# Patient Record
Sex: MALE | Race: WHITE | NOT HISPANIC OR LATINO | Employment: UNEMPLOYED | URBAN - METROPOLITAN AREA
[De-identification: names, ages, dates, MRNs, and addresses within clinical notes are randomized per-mention and may not be internally consistent; named-entity substitution may affect disease eponyms.]

---

## 2017-02-01 ENCOUNTER — HOSPITAL ENCOUNTER (EMERGENCY)
Facility: OTHER | Age: 52
Discharge: LEFT BEFORE DISCHARGE | End: 2017-02-01

## 2017-02-01 VITALS
WEIGHT: 230 LBS | HEART RATE: 111 BPM | HEIGHT: 69 IN | OXYGEN SATURATION: 97 % | DIASTOLIC BLOOD PRESSURE: 81 MMHG | BODY MASS INDEX: 34.07 KG/M2 | TEMPERATURE: 98 F | RESPIRATION RATE: 20 BRPM | SYSTOLIC BLOOD PRESSURE: 122 MMHG

## 2017-02-01 PROCEDURE — 99282 EMERGENCY DEPT VISIT SF MDM: CPT

## 2017-02-01 PROCEDURE — 99900041 HC LEFT WITHOUT BEING SEEN- EMERGENCY

## 2017-02-01 PROCEDURE — 93005 ELECTROCARDIOGRAM TRACING: CPT

## 2017-02-01 PROCEDURE — 93010 ELECTROCARDIOGRAM REPORT: CPT | Mod: ,,, | Performed by: INTERNAL MEDICINE

## 2017-02-01 NOTE — ED NOTES
"After triage pt states "look man heres the deal, Sharee been drinking for like a month, and I reall looking for an IV and some minerals" Pt with steady gait, no slurred speech, and no nystagmus.  "

## 2017-02-01 NOTE — ED NOTES
"PT and brother up and demanding patient be placed in bed and "admitted" explained we are actively trying to get a bed ready for the patient. Pt and brother then began cursing and pointing in my face, and left the ER.   "

## 2017-02-01 NOTE — ED NOTES
"PT brother at [facility] stating, you need to admit him he has problems" Explained to patient we were working as hard as we can to get a room open so the patient could be seen by the doctor. Pt brother asks why hes not being admitted, and why no one is helping him. Explained that pt has had an EKG which was reviewed by the doctor, and has been triaged and was stable to wait for a room. Pt brother then called RN a "fucking asshole", and returned to his seat in the waiting area.  "

## 2021-06-10 ENCOUNTER — OFFICE VISIT (OUTPATIENT)
Dept: URGENT CARE | Facility: CLINIC | Age: 56
End: 2021-06-10
Payer: COMMERCIAL

## 2021-06-10 VITALS
HEART RATE: 78 BPM | SYSTOLIC BLOOD PRESSURE: 143 MMHG | WEIGHT: 220 LBS | OXYGEN SATURATION: 96 % | HEIGHT: 69 IN | DIASTOLIC BLOOD PRESSURE: 89 MMHG | BODY MASS INDEX: 32.58 KG/M2 | TEMPERATURE: 99 F

## 2021-06-10 DIAGNOSIS — J34.89 SINUS PRESSURE: Primary | ICD-10-CM

## 2021-06-10 DIAGNOSIS — J20.9 ACUTE PURULENT BRONCHITIS: ICD-10-CM

## 2021-06-10 DIAGNOSIS — J98.01 ACUTE BRONCHOSPASM: ICD-10-CM

## 2021-06-10 DIAGNOSIS — J01.40 ACUTE PANSINUSITIS, RECURRENCE NOT SPECIFIED: ICD-10-CM

## 2021-06-10 LAB
CTP QC/QA: YES
SARS-COV-2 RDRP RESP QL NAA+PROBE: NEGATIVE

## 2021-06-10 PROCEDURE — 96372 PR INJECTION,THERAP/PROPH/DIAG2ST, IM OR SUBCUT: ICD-10-PCS | Mod: S$GLB,,, | Performed by: INTERNAL MEDICINE

## 2021-06-10 PROCEDURE — 99214 OFFICE O/P EST MOD 30 MIN: CPT | Mod: 25,S$GLB,, | Performed by: INTERNAL MEDICINE

## 2021-06-10 PROCEDURE — 3008F PR BODY MASS INDEX (BMI) DOCUMENTED: ICD-10-PCS | Mod: CPTII,S$GLB,, | Performed by: INTERNAL MEDICINE

## 2021-06-10 PROCEDURE — 3008F BODY MASS INDEX DOCD: CPT | Mod: CPTII,S$GLB,, | Performed by: INTERNAL MEDICINE

## 2021-06-10 PROCEDURE — 99214 PR OFFICE/OUTPT VISIT, EST, LEVL IV, 30-39 MIN: ICD-10-PCS | Mod: 25,S$GLB,, | Performed by: INTERNAL MEDICINE

## 2021-06-10 PROCEDURE — 96372 THER/PROPH/DIAG INJ SC/IM: CPT | Mod: S$GLB,,, | Performed by: INTERNAL MEDICINE

## 2021-06-10 PROCEDURE — U0002 COVID-19 LAB TEST NON-CDC: HCPCS | Mod: QW,S$GLB,, | Performed by: INTERNAL MEDICINE

## 2021-06-10 PROCEDURE — U0002: ICD-10-PCS | Mod: QW,S$GLB,, | Performed by: INTERNAL MEDICINE

## 2021-06-10 RX ORDER — BETAMETHASONE SODIUM PHOSPHATE AND BETAMETHASONE ACETATE 3; 3 MG/ML; MG/ML
9 INJECTION, SUSPENSION INTRA-ARTICULAR; INTRALESIONAL; INTRAMUSCULAR; SOFT TISSUE
Status: DISCONTINUED | OUTPATIENT
Start: 2021-06-10 | End: 2021-06-10

## 2021-06-10 RX ORDER — DEXAMETHASONE SODIUM PHOSPHATE 100 MG/10ML
10 INJECTION INTRAMUSCULAR; INTRAVENOUS
Status: COMPLETED | OUTPATIENT
Start: 2021-06-10 | End: 2021-06-10

## 2021-06-10 RX ORDER — AZITHROMYCIN 250 MG/1
TABLET, FILM COATED ORAL
Qty: 6 TABLET | Refills: 0 | Status: SHIPPED | OUTPATIENT
Start: 2021-06-10 | End: 2021-06-10 | Stop reason: CLARIF

## 2021-06-10 RX ADMIN — DEXAMETHASONE SODIUM PHOSPHATE 10 MG: 100 INJECTION INTRAMUSCULAR; INTRAVENOUS at 10:06

## 2022-06-29 ENCOUNTER — OFFICE VISIT (OUTPATIENT)
Dept: SLEEP MEDICINE | Facility: CLINIC | Age: 57
End: 2022-06-29
Payer: COMMERCIAL

## 2022-06-29 VITALS — BODY MASS INDEX: 36.88 KG/M2 | WEIGHT: 249 LBS | HEIGHT: 69 IN

## 2022-06-29 DIAGNOSIS — R53.83 FATIGUE, UNSPECIFIED TYPE: ICD-10-CM

## 2022-06-29 DIAGNOSIS — K21.9 GASTROESOPHAGEAL REFLUX DISEASE WITHOUT ESOPHAGITIS: ICD-10-CM

## 2022-06-29 DIAGNOSIS — F17.200 NEEDS SMOKING CESSATION EDUCATION: ICD-10-CM

## 2022-06-29 DIAGNOSIS — R06.83 SNORING: Primary | ICD-10-CM

## 2022-06-29 DIAGNOSIS — G47.30 SLEEP APNEA, UNSPECIFIED TYPE: ICD-10-CM

## 2022-06-29 PROCEDURE — 99999 PR PBB SHADOW E&M-EST. PATIENT-LVL III: CPT | Mod: PBBFAC,,, | Performed by: INTERNAL MEDICINE

## 2022-06-29 PROCEDURE — 1160F PR REVIEW ALL MEDS BY PRESCRIBER/CLIN PHARMACIST DOCUMENTED: ICD-10-PCS | Mod: CPTII,S$GLB,, | Performed by: INTERNAL MEDICINE

## 2022-06-29 PROCEDURE — 1159F PR MEDICATION LIST DOCUMENTED IN MEDICAL RECORD: ICD-10-PCS | Mod: CPTII,S$GLB,, | Performed by: INTERNAL MEDICINE

## 2022-06-29 PROCEDURE — 1160F RVW MEDS BY RX/DR IN RCRD: CPT | Mod: CPTII,S$GLB,, | Performed by: INTERNAL MEDICINE

## 2022-06-29 PROCEDURE — 99202 PR OFFICE/OUTPT VISIT, NEW, LEVL II, 15-29 MIN: ICD-10-PCS | Mod: S$GLB,,, | Performed by: INTERNAL MEDICINE

## 2022-06-29 PROCEDURE — 99999 PR PBB SHADOW E&M-EST. PATIENT-LVL III: ICD-10-PCS | Mod: PBBFAC,,, | Performed by: INTERNAL MEDICINE

## 2022-06-29 PROCEDURE — 1159F MED LIST DOCD IN RCRD: CPT | Mod: CPTII,S$GLB,, | Performed by: INTERNAL MEDICINE

## 2022-06-29 PROCEDURE — 3008F BODY MASS INDEX DOCD: CPT | Mod: CPTII,S$GLB,, | Performed by: INTERNAL MEDICINE

## 2022-06-29 PROCEDURE — 3008F PR BODY MASS INDEX (BMI) DOCUMENTED: ICD-10-PCS | Mod: CPTII,S$GLB,, | Performed by: INTERNAL MEDICINE

## 2022-06-29 PROCEDURE — 99202 OFFICE O/P NEW SF 15 MIN: CPT | Mod: S$GLB,,, | Performed by: INTERNAL MEDICINE

## 2022-06-29 RX ORDER — ASPIRIN 325 MG
325 TABLET ORAL DAILY
COMMUNITY

## 2022-06-29 RX ORDER — OMEPRAZOLE 10 MG/1
10 CAPSULE, DELAYED RELEASE ORAL DAILY
COMMUNITY
End: 2023-11-06

## 2022-06-29 NOTE — PROGRESS NOTES
Subjective:       Patient ID: Silverio Devlin is a 57 y.o. male.    Chief Complaint: Sleeping Problem    I had the pleasure of seeing Sliverio Devlin today, who is a 57 y.o. male that presents with apnea during sleep.    Silverio Devlin does not have a CDL.    Silverio Devlin is not a shift worker.    Silverio Devlin presents with apnea that has been going on for 15 years    Bedtime when working ranges from 2300 to 2330.   When not working, bedtime ranges from 2300 to 2330.   Sleep latency ranges from 10 to 15 minutes.     Average number of awakenings is 4 and return to sleep is quick.   Wake up time when working is 0700 to 0700.   When not working, wake up time is 0800 to 0900.   Patient does not feel rested upon awakening.    Silverio Devlin consumes approximately 2 pots beverages with caffeine daily.   An average of 0 beverages with alcohol are consumed    Medications taken for sleep currently: none  Previous medications taken: none     Silverio Devlin does experience daytime sleepiness.   Naps are taken about 0 times weekly, usually lasting NA to NA minutes.  Silverio currently does operate an automobile.  Silverio Devlin does not experience drowsiness when driving.   Patient does doze off when sedentary.   Silverio Devlin does not have auxiliary symptoms of narcolepsy including sleep onset paralysis, hypnagogic hallucinations, sleep attacks and cataplexy  ESS 12    Silverio Devlin has a history of snoring.   Snoring is described as severe and constant.   Apneic episodes have been noticed during sleep.   A witness to sleep is present.   The patient awakens with mouth dryness.      Silverio Devlin does not have symptoms of Restless Legs Syndrome. Nocturnal leg movements have not been noticed.   The patient does not experience sleep related leg cramps.   There is not a history of parasomnia.      Current Outpatient Medications:     aspirin 325 MG tablet, Take 325 mg by mouth once daily., Disp: , Rfl:     ibuprofen  "(ADVIL,MOTRIN) 100 MG tablet, Take 100 mg by mouth every 6 (six) hours as needed for Temperature greater than., Disp: , Rfl:     omeprazole (PRILOSEC) 10 MG capsule, Take 10 mg by mouth once daily., Disp: , Rfl:      Review of patient's allergies indicates:  No Known Allergies      History reviewed. No pertinent past medical history.    History reviewed. No pertinent surgical history.    History reviewed. No pertinent family history.    Social History     Socioeconomic History    Marital status:    Tobacco Use    Smoking status: Current Some Day Smoker    Smokeless tobacco: Never Used           Old medical records.    There were no vitals filed for this visit.           The patient was given open opportunity to ask questions and/or express concerns about treatment plan.   All questions/concerns were discussed.   Driving precautions were provided.     Two patient identifiers used prior to evaluation.    Thank you for referring Silverio Devlin for evaluation.           History reviewed. No pertinent past medical history.  History reviewed. No pertinent surgical history.  History reviewed. No pertinent family history.  Social History     Socioeconomic History    Marital status:    Tobacco Use    Smoking status: Current Some Day Smoker    Smokeless tobacco: Never Used       Current Outpatient Medications   Medication Sig Dispense Refill    aspirin 325 MG tablet Take 325 mg by mouth once daily.      ibuprofen (ADVIL,MOTRIN) 100 MG tablet Take 100 mg by mouth every 6 (six) hours as needed for Temperature greater than.      omeprazole (PRILOSEC) 10 MG capsule Take 10 mg by mouth once daily.       No current facility-administered medications for this visit.     Review of patient's allergies indicates:  No Known Allergies    Review of Systems    Objective:      Vitals:    06/29/22 1037   Weight: 112.9 kg (249 lb)   Height: 5' 9" (1.753 m)     Physical Exam    Lab Review:   CBC:   Lab Results "   Component Value Date    WBC 6.41 11/09/2010    RBC 4.03 (L) 11/09/2010    HGB 13.0 (L) 11/09/2010    HCT 38.2 (L) 11/09/2010     (L) 11/09/2010     BMP:   Lab Results   Component Value Date    GLU 87 11/09/2010     11/09/2010    K 3.7 11/09/2010     11/09/2010    CO2 22 (L) 11/09/2010    BUN 9 11/09/2010    CREATININE 0.7 11/09/2010    CALCIUM 8.5 (L) 11/09/2010     Diagnostics Review: Echo: Reviewed     Assessment:       1. Snoring    2. Sleep apnea, unspecified type    3. Fatigue, unspecified type    4. Gastroesophageal reflux disease without esophagitis        Plan:       Due to listed symptoms, a polysomnogram is recommended and ordered.   Description of procedure given to patient.   If significant Obstructive Sleep Apnea (FRANKO) is found during the initial portion of the study, therapy will be initiated with nasal Continuous Positive Airway Pressure (CPAP).   Goals of therapy were discussed, alternative treatments listed and patient agrees to this form of therapy if indicated.   The pathophysiology of FRANKO was discussed.   The effects of FRANKO on patient's co-morbid conditions and the increased morbidity and/or mortality associated with this condition were reviewed.   The patient was given open opportunity to ask questions and/or express concerns about treatment plan.   All questions/concerns were discussed.   Driving precautions were provided.       Thank you for referring Silverio Devlin for evaluation.

## 2022-07-01 ENCOUNTER — TELEPHONE (OUTPATIENT)
Dept: SLEEP MEDICINE | Facility: OTHER | Age: 57
End: 2022-07-01
Payer: COMMERCIAL

## 2022-07-07 ENCOUNTER — TELEPHONE (OUTPATIENT)
Dept: SLEEP MEDICINE | Facility: OTHER | Age: 57
End: 2022-07-07
Payer: COMMERCIAL

## 2022-07-08 ENCOUNTER — HOSPITAL ENCOUNTER (OUTPATIENT)
Dept: SLEEP MEDICINE | Facility: OTHER | Age: 57
Discharge: HOME | End: 2022-07-08
Attending: INTERNAL MEDICINE
Payer: COMMERCIAL

## 2022-07-08 DIAGNOSIS — G47.30 SLEEP APNEA, UNSPECIFIED TYPE: ICD-10-CM

## 2022-07-08 DIAGNOSIS — R53.83 FATIGUE, UNSPECIFIED TYPE: ICD-10-CM

## 2022-07-08 DIAGNOSIS — K21.9 GASTROESOPHAGEAL REFLUX DISEASE WITHOUT ESOPHAGITIS: ICD-10-CM

## 2022-07-08 DIAGNOSIS — R06.83 SNORING: ICD-10-CM

## 2022-07-08 PROCEDURE — 95800 SLP STDY UNATTENDED: CPT

## 2022-07-08 PROCEDURE — 95806 SLEEP STUDY UNATT&RESP EFFT: CPT | Mod: 26,,, | Performed by: INTERNAL MEDICINE

## 2022-07-08 PROCEDURE — 95806 PR SLEEP STUDY, UNATTENDED, SIMUL RECORD HR/O2 SAT/RESP FLOW/RESP EFFT: ICD-10-PCS | Mod: 26,,, | Performed by: INTERNAL MEDICINE

## 2022-07-26 ENCOUNTER — TELEPHONE (OUTPATIENT)
Dept: SLEEP MEDICINE | Facility: CLINIC | Age: 57
End: 2022-07-26
Payer: COMMERCIAL

## 2022-07-26 NOTE — TELEPHONE ENCOUNTER
Staff contacted patient in regards to his sleep study  results informed patient Dr Alberto is no longer with [facility] and once Sukhjinder gets through sleep results staff will contact patient

## 2022-07-26 NOTE — TELEPHONE ENCOUNTER
----- Message from Joelle Cox sent at 7/26/2022 10:19 AM CDT -----  Regarding: ssleep study results  Name of Who is Calling: SHADIA SOLORZANO [[id number]]      What is the request in detail: Patient is requesting a callback concerning his test results he states no  has called him back concerning his results       Can the clinic reply by [facility]: no      What [facility]: [phone]

## 2022-08-01 ENCOUNTER — TELEPHONE (OUTPATIENT)
Dept: SLEEP MEDICINE | Facility: CLINIC | Age: 57
End: 2022-08-01
Payer: COMMERCIAL

## 2022-08-01 NOTE — TELEPHONE ENCOUNTER
Staff contacted patient in regards to his sleep study results informed patient Dr Alberto is no longer with [facility] and once Sukhjinder gets his sleep results provider will contact pt. In portal.

## 2022-08-01 NOTE — TELEPHONE ENCOUNTER
----- Message from Sylvia Bonilla sent at 8/1/2022  4:47 PM CDT -----  Regarding: results  Contact: SHADIA SOLORZANO [[id number]]  Name of Who is Calling:SHADIA SOLORZANO [[id number]]          What is the request in detail: Pt states he is requesting a call back in regards to his sleep study results , he states he hasn't heard back yet from staff. Please advise           Can the clinic reply by [facility]: No           [facility]:[phone]

## 2022-08-05 ENCOUNTER — PATIENT MESSAGE (OUTPATIENT)
Dept: SLEEP MEDICINE | Facility: CLINIC | Age: 57
End: 2022-08-05
Payer: COMMERCIAL

## 2022-08-05 DIAGNOSIS — G47.33 SEVERE OBSTRUCTIVE SLEEP APNEA: Primary | ICD-10-CM

## 2022-09-27 ENCOUNTER — OFFICE VISIT (OUTPATIENT)
Dept: OTOLARYNGOLOGY | Facility: CLINIC | Age: 57
End: 2022-09-27
Payer: COMMERCIAL

## 2022-09-27 VITALS — BODY MASS INDEX: 37.58 KG/M2 | HEIGHT: 69 IN | WEIGHT: 253.75 LBS

## 2022-09-27 DIAGNOSIS — J34.3 NASAL TURBINATE HYPERTROPHY: ICD-10-CM

## 2022-09-27 DIAGNOSIS — M95.0 ACQUIRED NASAL DEFORMITY: ICD-10-CM

## 2022-09-27 DIAGNOSIS — J34.2 DEVIATED NASAL SEPTUM: ICD-10-CM

## 2022-09-27 DIAGNOSIS — G47.33 OSA (OBSTRUCTIVE SLEEP APNEA): Primary | ICD-10-CM

## 2022-09-27 PROCEDURE — 31231 NASAL ENDOSCOPY DX: CPT | Mod: S$GLB,,, | Performed by: OTOLARYNGOLOGY

## 2022-09-27 PROCEDURE — 99999 PR PBB SHADOW E&M-EST. PATIENT-LVL III: CPT | Mod: PBBFAC,,, | Performed by: OTOLARYNGOLOGY

## 2022-09-27 PROCEDURE — 1159F MED LIST DOCD IN RCRD: CPT | Mod: CPTII,S$GLB,, | Performed by: OTOLARYNGOLOGY

## 2022-09-27 PROCEDURE — 31231 NASAL/SINUS ENDOSCOPY: ICD-10-PCS | Mod: S$GLB,,, | Performed by: OTOLARYNGOLOGY

## 2022-09-27 PROCEDURE — 99999 PR PBB SHADOW E&M-EST. PATIENT-LVL III: ICD-10-PCS | Mod: PBBFAC,,, | Performed by: OTOLARYNGOLOGY

## 2022-09-27 PROCEDURE — 1160F PR REVIEW ALL MEDS BY PRESCRIBER/CLIN PHARMACIST DOCUMENTED: ICD-10-PCS | Mod: CPTII,S$GLB,, | Performed by: OTOLARYNGOLOGY

## 2022-09-27 PROCEDURE — 1159F PR MEDICATION LIST DOCUMENTED IN MEDICAL RECORD: ICD-10-PCS | Mod: CPTII,S$GLB,, | Performed by: OTOLARYNGOLOGY

## 2022-09-27 PROCEDURE — 99203 OFFICE O/P NEW LOW 30 MIN: CPT | Mod: 25,S$GLB,, | Performed by: OTOLARYNGOLOGY

## 2022-09-27 PROCEDURE — 99203 PR OFFICE/OUTPT VISIT, NEW, LEVL III, 30-44 MIN: ICD-10-PCS | Mod: 25,S$GLB,, | Performed by: OTOLARYNGOLOGY

## 2022-09-27 PROCEDURE — 3008F BODY MASS INDEX DOCD: CPT | Mod: CPTII,S$GLB,, | Performed by: OTOLARYNGOLOGY

## 2022-09-27 PROCEDURE — 1160F RVW MEDS BY RX/DR IN RCRD: CPT | Mod: CPTII,S$GLB,, | Performed by: OTOLARYNGOLOGY

## 2022-09-27 PROCEDURE — 3008F PR BODY MASS INDEX (BMI) DOCUMENTED: ICD-10-PCS | Mod: CPTII,S$GLB,, | Performed by: OTOLARYNGOLOGY

## 2022-09-27 NOTE — PROGRESS NOTES
Subjective:      Silverio Devlin is a 57 y.o. male who was self-referred for snoring.    He relates a long history for many years of disordered nighttime breathing, with associated mouth breathing, snoring, gasping, witnessed apneas, and nonrejuvenating sleep.  He also relates nasal blockage during the day, worse on the right side.  He has tried nasal steroid sprays and saline for months at a time in the past with limited relief.  He had a sinus infection last summer but otherwise denies rhinorrhea, postnasal drip, hyposmia, facial pressure or notable sinus infections.    Current sinonasal medications include none at present.  The last course of antibiotics was a long time ago.  He does not regularly use nasal decongestant sprays.    He does not recall previously having allergy testing.    He denies a history of asthma.    He relates a history of reflux symptoms which is managed with over-the-counter medication as needed.      He relates a diagnosis of obstructive sleep apnea without regular CPAP use.  He is awaiting CPAP titration.    He has not had sinonasal surgery.  He has not had a tonsillectomy.    He recalls a prior history of nasal trauma consisting of a broken nose over 30 years ago.    SNOT-22 score: : (P) 33  NOSE score:: (P) 80%  ETDQ-7 score:: (P) 1      Past Medical History  He has no past medical history on file.    Past Surgical History  He has no past surgical history on file.    Family History  His family history is not on file.    Social History  He reports that he has been smoking. He has never been exposed to tobacco smoke. He has never used smokeless tobacco.    Allergies  He has No Known Allergies.    Medications   He has a current medication list which includes the following prescription(s): aspirin, ibuprofen, and omeprazole.    Review of Systems     Constitutional: Negative for appetite change, chills, fatigue, fever and unexpected weight loss.      HENT: Negative for ear discharge, ear  "infection, ear pain, facial swelling, hearing loss, mouth sores, nosebleeds, postnasal drip, ringing in the ears, runny nose, sinus infection, sinus pressure, sore throat, stuffy nose, tonsil infection, dental problems, trouble swallowing and voice change.      Eyes:  Negative for change in eyesight, eye drainage, eye itching and photophobia.     Respiratory:  Negative for cough, shortness of breath, sleep apnea, snoring and wheezing.      Cardiovascular:  Negative for chest pain, foot swelling, irregular heartbeat and swollen veins.     Gastrointestinal:  Negative for abdominal pain, acid reflux, constipation, diarrhea, heartburn and vomiting.     Genitourinary: Negative for difficulty urinating, sexual problems and frequent urination.     Musc: Negative for aching joints, aching muscles, back pain and neck pain.     Skin: Negative for rash.     Allergy: Negative for food allergies and seasonal allergies.     Endocrine: Negative for cold intolerance and heat intolerance.      Neurological: Negative for dizziness, headaches, light-headedness, seizures and tremors.      Hematologic: Negative for bruises/bleeds easily and swollen glands.      Psychiatric: Negative for decreased concentration, depression, nervous/anxious and sleep disturbance.             Objective:     Ht 5' 9" (1.753 m)   Wt 115.1 kg (253 lb 12 oz)   BMI 37.47 kg/m²        Constitutional:   He appears well-developed. He is cooperative. Normal speech.  No hoarse voice.      Head:  Normocephalic. Salivary glands normal.  Facial strength is normal.      Ears:    Right Ear: No drainage or tenderness. Tympanic membrane is not perforated. Tympanic membrane mobility is normal. No middle ear effusion. No decreased hearing is noted.   Left Ear: No drainage or tenderness. Tympanic membrane is not perforated. Tympanic membrane mobility is normal.  No middle ear effusion. No decreased hearing is noted.     Nose:  Septal deviation present. No mucosal edema, " rhinorrhea or polyps. No epistaxis. Turbinate hypertrophy.  Turbinates normal and no turbinate masses.  Right sinus exhibits no maxillary sinus tenderness and no frontal sinus tenderness. Left sinus exhibits no maxillary sinus tenderness and no frontal sinus tenderness.   Minimally positive modified [place] maneuver  Leftward broad caudal septal deflection    Mouth/Throat  Oropharynx clear and moist without lesions or asymmetry and normal uvula midline. He does not have dentures. Normal dentition. No oral lesions or mucous membrane lesions. No oropharyngeal exudate or posterior oropharyngeal erythema. Mirror exam not performed due to patient tolerance.  Mirror exam not performed due to patient tolerance.      Neck:  Neck normal without thyromegaly masses, asymmetry, normal tracheal structure, crepitus, and tenderness, thyroid normal, trachea normal and no adenopathy. Normal range of motion present.     He has no cervical adenopathy.     Cardiovascular:    Regular rhythm.              Pulmonary/Chest:   Effort normal.     Psychiatric:   He has a normal mood and affect. His speech is normal and behavior is normal.     Neurological:   No cranial nerve deficit.     Skin:   No rash noted.     Procedure    Nasal endoscopy performed.  See procedure note.     Left nasal valve     Left MT     Left choana     Right nasal valve     Right MT     Right vomerian spur     Right choana      Data Reviewed    WBC (K/uL)   Date Value   11/09/2010 6.41     Eosinophil % (%)   Date Value   11/09/2010 0.5     Eos # (K/uL)   Date Value   11/09/2010 0.0     Platelets (K/uL)   Date Value   11/09/2010 113 (L)     Glucose (mg/dl)   Date Value   11/09/2010 87     No results found for: IGE    No sinus imaging available.       Assessment:     1. FRANKO (obstructive sleep apnea)    2. Acquired nasal deformity    3. Deviated nasal septum    4. Nasal turbinate hypertrophy         Plan:     I had a long discussion with the patient regarding his condition  and the further workup and management options.  I referred him to Dr. Gwyn Mendez, Dr. Mitchel Bush, or Dr. Mark Anthony Dobson (facial plastic surgeons) for further evaluation for surgical management.  He will defer CPAP titration until after eventual surgery.    Follow up if symptoms worsen or fail to improve.

## 2022-09-27 NOTE — PROCEDURES
Nasal/sinus endoscopy    Date/Time: 9/27/2022 2:00 PM  Performed by: Rodrigo Florian MD  Authorized by: Rodrigo Florian MD     Consent Done?:  Yes (Verbal)  Anesthesia:     Local anesthetic:  Lidocaine 4% and Kam-Synephrine 1/2%    Patient tolerance:  Patient tolerated the procedure well with no immediate complications  Nose:     Procedure Performed:  Nasal Endoscopy  External:      No external nasal deformity  Intranasal:      Mucosa no polyps     Mucosa ulcers not present     No mucosa lesions present     Enlarged turbinates     Septum gross deformity  Nasopharynx:      No mucosa lesions     Adenoids not present     Posterior choanae patent     Eustachian tube patent     Biphasic septal deviation  Caudally on left  Vomerian spur on right  No polyps or purulence  No adenoids

## 2022-09-27 NOTE — PATIENT INSTRUCTIONS
Please consider an evaluation by Dr. Gwyn Mendez, Dr. Mitchel Bush, or Dr. Mark Anthony Dobson (facial plastic surgeons) to treat the nasal blockage.

## 2022-09-29 ENCOUNTER — OFFICE VISIT (OUTPATIENT)
Dept: OTOLARYNGOLOGY | Facility: CLINIC | Age: 57
End: 2022-09-29
Payer: COMMERCIAL

## 2022-09-29 VITALS — TEMPERATURE: 98 F | SYSTOLIC BLOOD PRESSURE: 131 MMHG | DIASTOLIC BLOOD PRESSURE: 84 MMHG | HEART RATE: 81 BPM

## 2022-09-29 DIAGNOSIS — J34.3 NASAL TURBINATE HYPERTROPHY: ICD-10-CM

## 2022-09-29 DIAGNOSIS — M95.0 ACQUIRED NASAL DEFORMITY: Primary | ICD-10-CM

## 2022-09-29 DIAGNOSIS — J34.2 DEVIATED NASAL SEPTUM: ICD-10-CM

## 2022-09-29 DIAGNOSIS — Z01.818 PRE-OP TESTING: Primary | ICD-10-CM

## 2022-09-29 DIAGNOSIS — G47.33 OSA (OBSTRUCTIVE SLEEP APNEA): ICD-10-CM

## 2022-09-29 DIAGNOSIS — M95.0 NASAL VALVE COLLAPSE: ICD-10-CM

## 2022-09-29 PROCEDURE — 99214 PR OFFICE/OUTPT VISIT, EST, LEVL IV, 30-39 MIN: ICD-10-PCS | Mod: S$GLB,,, | Performed by: OTOLARYNGOLOGY

## 2022-09-29 PROCEDURE — 3075F PR MOST RECENT SYSTOLIC BLOOD PRESS GE 130-139MM HG: ICD-10-PCS | Mod: CPTII,S$GLB,, | Performed by: OTOLARYNGOLOGY

## 2022-09-29 PROCEDURE — 1159F MED LIST DOCD IN RCRD: CPT | Mod: CPTII,S$GLB,, | Performed by: OTOLARYNGOLOGY

## 2022-09-29 PROCEDURE — 1159F PR MEDICATION LIST DOCUMENTED IN MEDICAL RECORD: ICD-10-PCS | Mod: CPTII,S$GLB,, | Performed by: OTOLARYNGOLOGY

## 2022-09-29 PROCEDURE — 99214 OFFICE O/P EST MOD 30 MIN: CPT | Mod: S$GLB,,, | Performed by: OTOLARYNGOLOGY

## 2022-09-29 PROCEDURE — 3079F PR MOST RECENT DIASTOLIC BLOOD PRESSURE 80-89 MM HG: ICD-10-PCS | Mod: CPTII,S$GLB,, | Performed by: OTOLARYNGOLOGY

## 2022-09-29 PROCEDURE — 3075F SYST BP GE 130 - 139MM HG: CPT | Mod: CPTII,S$GLB,, | Performed by: OTOLARYNGOLOGY

## 2022-09-29 PROCEDURE — 1160F PR REVIEW ALL MEDS BY PRESCRIBER/CLIN PHARMACIST DOCUMENTED: ICD-10-PCS | Mod: CPTII,S$GLB,, | Performed by: OTOLARYNGOLOGY

## 2022-09-29 PROCEDURE — 1160F RVW MEDS BY RX/DR IN RCRD: CPT | Mod: CPTII,S$GLB,, | Performed by: OTOLARYNGOLOGY

## 2022-09-29 PROCEDURE — 3079F DIAST BP 80-89 MM HG: CPT | Mod: CPTII,S$GLB,, | Performed by: OTOLARYNGOLOGY

## 2022-09-29 NOTE — PROGRESS NOTES
OTOLARYNGOLOGY- FACIAL PLASTIC & RECONSTRUCTIVE SURGERY      Silverio Devlin  [id number]    CC:  Chief Complaint   Patient presents with    Nasal obstruction           HISTORY OF PRESENT ILLNESS: Silverio Devlin  is a 57 y.o. male who was  referred by Dr. Florian  for nasal obstruction.  Silverio reports a several year history of difficulty breathing on left and right side. The obstruction is worse on the right side  The obstruction is constant.     There is a history of nasal trauma.  There is not a history of previous nasal surgery.       He relates a long history for many years of disordered nighttime breathing, with associated mouth breathing, snoring, gasping, witnessed apneas, and nonrejuvenating sleep.      He has tried nasal steroid sprays and saline for months at a time in the past with limited relief.  He had a sinus infection last summer but otherwise denies rhinorrhea, postnasal drip, hyposmia, facial pressure or notable sinus infections.  The last course of antibiotics was a long time ago.  He does not regularly use nasal decongestant sprays.     He does not recall previously having allergy testing.     He denies a history of asthma.     He relates a history of reflux symptoms which is managed with over-the-counter medication as needed.       He relates a diagnosis of obstructive sleep apnea without regular CPAP use.  He is awaiting CPAP titration.     He has not had sinonasal surgery.  He has not had a tonsillectomy.     SNOT Questionnaire Total Score 9/28/2022 9/24/2022   SNOT-22 score: 33 33     NOSE score:: (P) 75%      Past Medical History  He has no past medical history on file.    Past Surgical History  He has no past surgical history on file.    Family History  His family history is not on file.    Social History  He reports that he has been smoking. He has never been exposed to tobacco smoke. He has never used smokeless tobacco.    Allergies  He has No Known Allergies.    Medications  He has a current  medication list which includes the following prescription(s): aspirin, ibuprofen, and omeprazole.      Review of Systems     Constitutional: Positive for fatigue.      HENT: Positive for voice change.      Respiratory:  Positive for shortness of breath, sleep apnea and snoring.      Cardiovascular:  Negative for chest pain, foot swelling, irregular heartbeat and swollen veins.     Gastrointestinal:  Positive for acid reflux and constipation.     Genitourinary: Positive for frequent urination.     Musc: Positive for aching muscles and neck pain.     Skin: Negative for rash.     Allergy: Negative for food allergies and seasonal allergies.     Endocrine: Negative for cold intolerance and heat intolerance.      Neurological: Positive for headaches.     Hematologic: Positive for bruises/bleeds easily.     Psychiatric: Positive for depression and sleep disturbance.           PHYSICAL EXAM:  /84   Pulse 81   Temp 97.7 °F (36.5 °C) (Temporal)   General: Alert and oriented in no acute distress  Head and Face: Normocephaic, atruamatic  Neurological: Cranial nerves II-XII are grossly intact  Skin: Skin texture is thick rated as Quesada II  Eyes:   EOMI, sclera clear, PERRL  Ears: Pinna are normal in shape and position  EACs clear bilaterally  TMs clear without KARLI or perforation bilaterally  Nose: Nasal skin is thick    On frontal view the dorsum is  irregular with left depressed nasal bone  and slight dorsal lean to the right and the nose is proportional compared to the facial features.  The brow-tip aesthetic line is disrupted bilaterally .  Nasal base width is proportional compared to intercanthal distance.    On base view the tip is trapezoidal in shape.  The caudal septum is midline    On profile view the dorsum is convex with normal projection.  Tip projection is normal and rotation is normal.  Tip support is strong to palpation.  Columellar show is normal. Alar retraction is not present bilaterally. Hanging  columella is mild    Anterior rhinoscopy reveals the septum is S-shaped, .  Deviation, left inferior without perforation or synechiae.  The turbinates are mildly hypertrophic with edematous mucosa  bilaterally.  There are not pathological secretions present.     The external nasal valve is normal and patent  on the right and normal and patent  on the left without dynamic collapse  The internal nasal valve is narrow on the right and narrow on the left.  Modified Yunier maneuver on the right does improve breathing in the  position only  Modified [place] maneuver on the left does improve breathing in the  position only    Oral cavity and Oropharynx: Mucous membranes moist without lesions present.  Tongue protrudes midline and palate elevates midline.  Neck: Supple without LAD.    Lungs:breathing comfortably  Psychiatric:  Mood and affect are normal           ASSESSMENT:   1. Acquired nasal deformity    2. Deviated nasal septum    3. Nasal valve collapse    4. Nasal turbinate hypertrophy    5. FRANKO (obstructive sleep apnea)            PLAN:     I had a long discussion with the patient regarding his condition and the further workup and management options.     Silverio's nasal obstruction is related to deviated nasal septum, nasal valve collapse, inferior turbinate hypertrophy and significantly impacts his quality of life.  Previous medical treatments including intranasal steroid spray have failed to provide benefit.  There are anatomic abnormalities contributing to the nasal obstruction that require surgical intervention for adequate improvement.    We discussed the operative plan including septoplasty, internal nasal valve repair, inferior turbinate reduction.  This would accomplished via an open approach.  I have prepared Silverio for the possibility of requiring auricular cartilage or rib cartilage graft if needed.    The risks, benefits, and alternatives of the procedure were discussed in detail including  but not limited to bleeding, infection, pain, scar, septal perforation, synechiae, failure to improve, asymmetry or irregularity of the nose, need for repeat procedures, saddle deformity. We also discussed the expected post operative course and time frame for final healing.    We will plan on pursuing surgery on an outpatient basis in the near future.  Silverio expressed understanding of the procedure, had all questions answered, and is interested in proceeding. 23 hour obs given FRANKO.

## 2022-10-03 ENCOUNTER — TELEPHONE (OUTPATIENT)
Dept: SLEEP MEDICINE | Facility: OTHER | Age: 57
End: 2022-10-03
Payer: COMMERCIAL

## 2022-10-26 ENCOUNTER — ANESTHESIA EVENT (OUTPATIENT)
Dept: SURGERY | Facility: OTHER | Age: 57
End: 2022-10-26
Payer: COMMERCIAL

## 2022-10-26 ENCOUNTER — HOSPITAL ENCOUNTER (OUTPATIENT)
Dept: PREADMISSION TESTING | Facility: OTHER | Age: 57
Discharge: HOME | End: 2022-10-26
Attending: OTOLARYNGOLOGY
Payer: COMMERCIAL

## 2022-10-26 VITALS
BODY MASS INDEX: 36.18 KG/M2 | DIASTOLIC BLOOD PRESSURE: 99 MMHG | SYSTOLIC BLOOD PRESSURE: 143 MMHG | OXYGEN SATURATION: 98 % | WEIGHT: 245 LBS | HEART RATE: 65 BPM | TEMPERATURE: 98 F

## 2022-10-26 DIAGNOSIS — Z01.818 PRE-OP TESTING: ICD-10-CM

## 2022-10-26 LAB
ANION GAP SERPL CALC-SCNC: 4 MMOL/L (ref 8–16)
BASOPHILS # BLD AUTO: 0.03 K/UL (ref 0–0.2)
BASOPHILS NFR BLD: 0.4 % (ref 0–1.9)
BUN SERPL-MCNC: 15 MG/DL (ref 6–20)
CALCIUM SERPL-MCNC: 9.3 MG/DL (ref 8.7–10.5)
CHLORIDE SERPL-SCNC: 108 MMOL/L (ref 95–110)
CO2 SERPL-SCNC: 28 MMOL/L (ref 23–29)
CREAT SERPL-MCNC: 0.9 MG/DL (ref 0.5–1.4)
DIFFERENTIAL METHOD: ABNORMAL
EOSINOPHIL # BLD AUTO: 0 K/UL (ref 0–0.5)
EOSINOPHIL NFR BLD: 0.4 % (ref 0–8)
ERYTHROCYTE [DISTWIDTH] IN BLOOD BY AUTOMATED COUNT: 13.7 % (ref 11.5–14.5)
EST. GFR  (NO RACE VARIABLE): >60 ML/MIN/1.73 M^2
GLUCOSE SERPL-MCNC: 96 MG/DL (ref 70–110)
HCT VFR BLD AUTO: 42.7 % (ref 40–54)
HGB BLD-MCNC: 14.3 G/DL (ref 14–18)
IMM GRANULOCYTES # BLD AUTO: 0.03 K/UL (ref 0–0.04)
IMM GRANULOCYTES NFR BLD AUTO: 0.4 % (ref 0–0.5)
LYMPHOCYTES # BLD AUTO: 1.2 K/UL (ref 1–4.8)
LYMPHOCYTES NFR BLD: 18.5 % (ref 18–48)
MCH RBC QN AUTO: 32.4 PG (ref 27–31)
MCHC RBC AUTO-ENTMCNC: 33.5 G/DL (ref 32–36)
MCV RBC AUTO: 97 FL (ref 82–98)
MONOCYTES # BLD AUTO: 0.6 K/UL (ref 0.3–1)
MONOCYTES NFR BLD: 9.1 % (ref 4–15)
NEUTROPHILS # BLD AUTO: 4.8 K/UL (ref 1.8–7.7)
NEUTROPHILS NFR BLD: 71.2 % (ref 38–73)
NRBC BLD-RTO: 0 /100 WBC
PLATELET # BLD AUTO: 228 K/UL (ref 150–450)
PLATELET FUNCTION ASSAY - EPINEPHRINE: 147 SECS (ref 76–199)
PMV BLD AUTO: 10.2 FL (ref 9.2–12.9)
POTASSIUM SERPL-SCNC: 4.7 MMOL/L (ref 3.5–5.1)
RBC # BLD AUTO: 4.41 M/UL (ref 4.6–6.2)
SODIUM SERPL-SCNC: 140 MMOL/L (ref 136–145)
WBC # BLD AUTO: 6.7 K/UL (ref 3.9–12.7)

## 2022-10-26 PROCEDURE — 85576 BLOOD PLATELET AGGREGATION: CPT | Performed by: OTOLARYNGOLOGY

## 2022-10-26 PROCEDURE — 36415 COLL VENOUS BLD VENIPUNCTURE: CPT | Performed by: OTOLARYNGOLOGY

## 2022-10-26 PROCEDURE — 80048 BASIC METABOLIC PNL TOTAL CA: CPT | Performed by: OTOLARYNGOLOGY

## 2022-10-26 PROCEDURE — 85025 COMPLETE CBC W/AUTO DIFF WBC: CPT | Performed by: OTOLARYNGOLOGY

## 2022-10-26 RX ORDER — SODIUM CHLORIDE, SODIUM LACTATE, POTASSIUM CHLORIDE, CALCIUM CHLORIDE 600; 310; 30; 20 MG/100ML; MG/100ML; MG/100ML; MG/100ML
INJECTION, SOLUTION INTRAVENOUS CONTINUOUS
Status: CANCELLED | OUTPATIENT
Start: 2022-10-26

## 2022-10-26 RX ORDER — ALBUTEROL SULFATE 2.5 MG/.5ML
2.5 SOLUTION RESPIRATORY (INHALATION)
Status: CANCELLED | OUTPATIENT
Start: 2022-10-26 | End: 2022-10-26

## 2022-10-26 RX ORDER — LIDOCAINE HYDROCHLORIDE 10 MG/ML
0.5 INJECTION, SOLUTION EPIDURAL; INFILTRATION; INTRACAUDAL; PERINEURAL ONCE
Status: CANCELLED | OUTPATIENT
Start: 2022-10-26 | End: 2022-10-26

## 2022-10-26 RX ORDER — ACETAMINOPHEN 500 MG
1000 TABLET ORAL
Status: CANCELLED | OUTPATIENT
Start: 2022-10-26 | End: 2022-10-26

## 2022-10-26 NOTE — DISCHARGE INSTRUCTIONS
Information to Prepare you for your Surgery    PRE-ADMIT TESTING -  [phone]    [address] [facility]          Your surgery has been scheduled at [facility]. We are pleased to have the opportunity to serve you. For Further Information please call [phone].    On the day of surgery please report to the Information Desk on the 1st floor.    CONTACT YOUR PHYSICIAN'S OFFICE THE DAY PRIOR TO YOUR SURGERY TO OBTAIN YOUR ARRIVAL TIME.     The evening before surgery do not eat anything after 9 p.m. ( this includes hard candy, chewing gum and mints).  You may only have GATORADE, POWERADE AND WATER  from 9 p.m. until you leave your home.   DO NOT DRINK ANY LIQUIDS ON THE WAY TO THE HOSPITAL.      Why does your anesthesiologist allow you to drink Gatorade/Powerade before surgery?  Gatorade/Powerade helps to increase your comfort before surgery and to decrease your nausea after surgery. The carbohydrates in Gatorade/Powerade help reduce your body's stress response to surgery.  If you are a diabetic-drink only water prior to surgery.      Current Visitor policy(12/27/2021) - Patients may have 2 visitors pre and post procedure. Only 2 visitors will be allowed in the Surgical building with the patient.     SPECIAL MEDICATION INSTRUCTIONS: TAKE medications checked off by the Anesthesiologist on your Medication List.    Angiogram Patients: Take medications as instructed by your physician, including aspirin.     Surgery Patients:    If you take ASPIRIN - Your PHYSICIAN/SURGEON will need to inform you IF/OR when you need to stop taking aspirin prior to your surgery.     The week prior to surgery do not ot take any medications containing IBUPROFEN or NSAIDS ( Advil, Motrin, Goodys, BC, Aleve, Naproxen etc) If you are not sure if you should take a medicine please call your surgeon's office.  Ok to take Tylenol    Do Not Wear any make-up (especially eye make-up) to  surgery. Please remove any false eyelashes or eyelash extensions. If you arrive the day of surgery with makeup/eyelashes on you will be required to remove prior to surgery. (There is a risk of corneal abrasions if eye makeup/eyelash extensions are not removed)      Leave all valuables at home.   Do Not wear any jewelry or watches, including any metal in body piercings. Jewelry must be removed prior to coming to the hospital.  There is a possibility that rings that are unable to be removed may be cut off if they are on the surgical extremity.    Please remove all hair extensions, wigs, clips and any other metal accessories/ ornaments from your hair.  These items may pose a flammable/fire risk in Surgery and must be removed.    Do not shave your surgical area at least 5 days prior to your surgery. The surgical prep will be performed at the hospital according to Infection Control regulations.    Contact Lens must be removed before surgery. Either do not wear the contact lens or bring a case and solution for storage.  Please bring a container for eyeglasses or dentures as required.  Bring any paperwork your physician has provided, such as consent forms,  history and physicals, doctor's orders, etc.   Bring comfortable clothes that are loose fitting to wear upon discharge. Take into consideration the type of surgery being performed.  Maintain your diet as advised per your physician the day prior to surgery.      Adequate rest the night before surgery is advised.   Park in the Parking lot behind the hospital or in the [facility] across the street from the parking lot. Parking is complimentary.  If you will be discharged the same day as your procedure, please arrange for a responsible adult to drive you home or to accompany you if traveling by taxi.   YOU WILL NOT BE PERMITTED TO DRIVE OR TO LEAVE THE HOSPITAL ALONE AFTER SURGERY.   If you are being discharged the same day, it is strongly recommended that you  arrange for someone to remain with you for the first 24 hrs following your surgery.    The Surgeon will speak to your family/visitor after your surgery regarding the outcome of your surgery and post op care.  The Surgeon may speak to you after your surgery, but there is a possibility you may not remember the details.  Please check with your family members regarding the conversation with the Surgeon.    We strongly recommend whoever is bringing you home be present for discharge instructions.  This will ensure a thorough understanding for your post op home care.    ALL CHILDREN MUST ALWAYS BE ACCOMPANIED BY AN ADULT.    Visitors-Refer to current Visitor policy handouts.    Thank you for your cooperation.  The Staff of [facility].            Bathing Instructions with Hibiclens    Shower the evening before and morning of your procedure with Chlorhexidine (Hibiclens)  do not use Chlorhexidine on your face or genitals. Do not get in your eyes.  Wash your face with water and your regular face wash/soap  Use your regular shampoo  Apply Chlorhexidine (Hibiclens) directly on your skin or on a wet washcloth and wash gently. When showering: Move away from the shower stream when applying Chlorhexidine (Hibiclens) to avoid rinsing off too soon.  Rinse thoroughly with warm water  Do not dilute Chlorhexidine (Hibiclens)   Dry off as usual, do not use any deodorant, powder, body lotions, perfume, after shave or cologne.

## 2022-10-26 NOTE — ANESTHESIA PREPROCEDURE EVALUATION
10/26/2022  Silverio Devlin is a 57 y.o., male.      Pre-op Assessment    I have reviewed the Patient Summary Reports.     I have reviewed the Nursing Notes. I have reviewed the NPO Status.   I have reviewed the Medications.     Review of Systems  Anesthesia Hx:  No previous Anesthesia  Denies Family Hx of Anesthesia complications.    Social:  Smoker marijuana   Hematology/Oncology:  Hematology Normal   Oncology Normal     Cardiovascular:  Cardiovascular Normal Exercise tolerance: good     Pulmonary:   Sleep Apnea    Renal/:  Renal/ Normal     Hepatic/GI:   GERD    Neurological:   Headaches    Endocrine:  Obesity / BMI > 30      Physical Exam  General: Cooperative, Alert and Oriented    Airway:  Mallampati: III   Mouth Opening: Normal  TM Distance: Normal  Tongue: Normal  Neck ROM: Normal ROM    Dental:  Intact, Caps / Implants        Anesthesia Plan  Type of Anesthesia, risks & benefits discussed:    Anesthesia Type: Gen ETT  Intra-op Monitoring Plan: Standard ASA Monitors  Post Op Pain Control Plan: multimodal analgesia  Induction:  IV  Airway Plan: Video, Post-Induction  Informed Consent: Informed consent signed with the Patient and all parties understand the risks and agree with anesthesia plan.  All questions answered.   ASA Score: 2  Anesthesia Plan Notes: CBC WNL    Ready For Surgery From Anesthesia Perspective.     .

## 2022-11-07 ENCOUNTER — ANESTHESIA (OUTPATIENT)
Dept: SURGERY | Facility: OTHER | Age: 57
End: 2022-11-07
Payer: COMMERCIAL

## 2022-11-07 ENCOUNTER — HOSPITAL ENCOUNTER (OUTPATIENT)
Facility: OTHER | Age: 57
Discharge: HOME | End: 2022-11-07
Attending: OTOLARYNGOLOGY | Admitting: OTOLARYNGOLOGY
Payer: COMMERCIAL

## 2022-11-07 DIAGNOSIS — J34.89 NASAL OBSTRUCTION: Primary | ICD-10-CM

## 2022-11-07 DIAGNOSIS — M95.0 NASAL VALVE COLLAPSE: ICD-10-CM

## 2022-11-07 DIAGNOSIS — J34.2 DEVIATED SEPTUM: ICD-10-CM

## 2022-11-07 PROBLEM — J34.829 NASAL VALVE COLLAPSE: Status: ACTIVE | Noted: 2022-11-07

## 2022-11-07 PROCEDURE — 63600175 PHARM REV CODE 636 W HCPCS: Performed by: OTOLARYNGOLOGY

## 2022-11-07 PROCEDURE — 36000706: Performed by: OTOLARYNGOLOGY

## 2022-11-07 PROCEDURE — 71000016 HC POSTOP RECOV ADDL HR: Performed by: OTOLARYNGOLOGY

## 2022-11-07 PROCEDURE — 71000033 HC RECOVERY, INTIAL HOUR: Performed by: OTOLARYNGOLOGY

## 2022-11-07 PROCEDURE — 71000039 HC RECOVERY, EACH ADD'L HOUR: Performed by: OTOLARYNGOLOGY

## 2022-11-07 PROCEDURE — 25000003 PHARM REV CODE 250: Performed by: NURSE ANESTHETIST, CERTIFIED REGISTERED

## 2022-11-07 PROCEDURE — 36000707: Performed by: OTOLARYNGOLOGY

## 2022-11-07 PROCEDURE — 30520 PR REPAIR, NASAL SEPTUM: ICD-10-PCS | Mod: 51,,, | Performed by: OTOLARYNGOLOGY

## 2022-11-07 PROCEDURE — 63600175 PHARM REV CODE 636 W HCPCS: Performed by: NURSE ANESTHETIST, CERTIFIED REGISTERED

## 2022-11-07 PROCEDURE — 30465 REPAIR NASAL STENOSIS: CPT | Mod: ,,, | Performed by: OTOLARYNGOLOGY

## 2022-11-07 PROCEDURE — 30140 RESECT INFERIOR TURBINATE: CPT | Mod: 50,51,, | Performed by: OTOLARYNGOLOGY

## 2022-11-07 PROCEDURE — 30465 PR REPAIR NASAL CAVITY STENOSIS: ICD-10-PCS | Mod: ,,, | Performed by: OTOLARYNGOLOGY

## 2022-11-07 PROCEDURE — 37000009 HC ANESTHESIA EA ADD 15 MINS: Performed by: OTOLARYNGOLOGY

## 2022-11-07 PROCEDURE — 25000003 PHARM REV CODE 250: Performed by: OTOLARYNGOLOGY

## 2022-11-07 PROCEDURE — 63600175 PHARM REV CODE 636 W HCPCS: Performed by: ANESTHESIOLOGY

## 2022-11-07 PROCEDURE — 20912 PR REMV CARTILAGE FOR GRAFT NASAL: ICD-10-PCS | Mod: 51,,, | Performed by: OTOLARYNGOLOGY

## 2022-11-07 PROCEDURE — 37000008 HC ANESTHESIA 1ST 15 MINUTES: Performed by: OTOLARYNGOLOGY

## 2022-11-07 PROCEDURE — 25000242 PHARM REV CODE 250 ALT 637 W/ HCPCS: Performed by: ANESTHESIOLOGY

## 2022-11-07 PROCEDURE — 25000003 PHARM REV CODE 250: Performed by: ANESTHESIOLOGY

## 2022-11-07 PROCEDURE — 71000015 HC POSTOP RECOV 1ST HR: Performed by: OTOLARYNGOLOGY

## 2022-11-07 PROCEDURE — 94761 N-INVAS EAR/PLS OXIMETRY MLT: CPT

## 2022-11-07 PROCEDURE — 30140 PR EXCISION TURBINATE,SUBMUCOUS: ICD-10-PCS | Mod: 50,51,, | Performed by: OTOLARYNGOLOGY

## 2022-11-07 PROCEDURE — 30520 REPAIR OF NASAL SEPTUM: CPT | Mod: 51,,, | Performed by: OTOLARYNGOLOGY

## 2022-11-07 PROCEDURE — 20912 REMOVE CARTILAGE FOR GRAFT: CPT | Mod: 51,,, | Performed by: OTOLARYNGOLOGY

## 2022-11-07 PROCEDURE — 94640 AIRWAY INHALATION TREATMENT: CPT

## 2022-11-07 PROCEDURE — 27201423 OPTIME MED/SURG SUP & DEVICES STERILE SUPPLY: Performed by: OTOLARYNGOLOGY

## 2022-11-07 RX ORDER — OXYCODONE HYDROCHLORIDE 5 MG/1
5 TABLET ORAL
Status: DISCONTINUED | OUTPATIENT
Start: 2022-11-07 | End: 2022-11-07 | Stop reason: HOSPADM

## 2022-11-07 RX ORDER — DEXAMETHASONE SODIUM PHOSPHATE 4 MG/ML
INJECTION, SOLUTION INTRA-ARTICULAR; INTRALESIONAL; INTRAMUSCULAR; INTRAVENOUS; SOFT TISSUE
Status: DISCONTINUED | OUTPATIENT
Start: 2022-11-07 | End: 2022-11-07

## 2022-11-07 RX ORDER — MIDAZOLAM HYDROCHLORIDE 1 MG/ML
INJECTION INTRAMUSCULAR; INTRAVENOUS
Status: DISCONTINUED | OUTPATIENT
Start: 2022-11-07 | End: 2022-11-07

## 2022-11-07 RX ORDER — LIDOCAINE HYDROCHLORIDE 10 MG/ML
0.5 INJECTION, SOLUTION EPIDURAL; INFILTRATION; INTRACAUDAL; PERINEURAL ONCE
Status: DISCONTINUED | OUTPATIENT
Start: 2022-11-07 | End: 2022-11-07 | Stop reason: HOSPADM

## 2022-11-07 RX ORDER — DIPHENHYDRAMINE HYDROCHLORIDE 50 MG/ML
12.5 INJECTION INTRAMUSCULAR; INTRAVENOUS EVERY 30 MIN PRN
Status: DISCONTINUED | OUTPATIENT
Start: 2022-11-07 | End: 2022-11-07 | Stop reason: HOSPADM

## 2022-11-07 RX ORDER — HYDROMORPHONE HYDROCHLORIDE 2 MG/ML
0.4 INJECTION, SOLUTION INTRAMUSCULAR; INTRAVENOUS; SUBCUTANEOUS EVERY 5 MIN PRN
Status: DISCONTINUED | OUTPATIENT
Start: 2022-11-07 | End: 2022-11-07 | Stop reason: HOSPADM

## 2022-11-07 RX ORDER — EPINEPHRINE 1 MG/ML
INJECTION, SOLUTION, CONCENTRATE INTRAVENOUS
Status: DISCONTINUED | OUTPATIENT
Start: 2022-11-07 | End: 2022-11-07 | Stop reason: HOSPADM

## 2022-11-07 RX ORDER — LIDOCAINE HCL/PF 100 MG/5ML
SYRINGE (ML) INTRAVENOUS
Status: DISCONTINUED | OUTPATIENT
Start: 2022-11-07 | End: 2022-11-07

## 2022-11-07 RX ORDER — ACETAMINOPHEN 500 MG
1000 TABLET ORAL
Status: COMPLETED | OUTPATIENT
Start: 2022-11-07 | End: 2022-11-07

## 2022-11-07 RX ORDER — PROCHLORPERAZINE EDISYLATE 5 MG/ML
5 INJECTION INTRAMUSCULAR; INTRAVENOUS EVERY 30 MIN PRN
Status: DISCONTINUED | OUTPATIENT
Start: 2022-11-07 | End: 2022-11-07 | Stop reason: HOSPADM

## 2022-11-07 RX ORDER — ALBUTEROL SULFATE 2.5 MG/.5ML
2.5 SOLUTION RESPIRATORY (INHALATION)
Status: COMPLETED | OUTPATIENT
Start: 2022-11-07 | End: 2022-11-07

## 2022-11-07 RX ORDER — ONDANSETRON 2 MG/ML
INJECTION INTRAMUSCULAR; INTRAVENOUS
Status: DISCONTINUED | OUTPATIENT
Start: 2022-11-07 | End: 2022-11-07

## 2022-11-07 RX ORDER — FENTANYL CITRATE 50 UG/ML
INJECTION, SOLUTION INTRAMUSCULAR; INTRAVENOUS
Status: DISCONTINUED | OUTPATIENT
Start: 2022-11-07 | End: 2022-11-07

## 2022-11-07 RX ORDER — HYDROCODONE BITARTRATE AND ACETAMINOPHEN 5; 325 MG/1; MG/1
1 TABLET ORAL EVERY 6 HOURS PRN
Qty: 20 TABLET | Refills: 0 | Status: SHIPPED | OUTPATIENT
Start: 2022-11-07 | End: 2022-11-15 | Stop reason: SDUPTHER

## 2022-11-07 RX ORDER — PHENYLEPHRINE HYDROCHLORIDE 10 MG/ML
INJECTION INTRAVENOUS
Status: DISCONTINUED | OUTPATIENT
Start: 2022-11-07 | End: 2022-11-07

## 2022-11-07 RX ORDER — HYDROCODONE BITARTRATE AND ACETAMINOPHEN 5; 325 MG/1; MG/1
1 TABLET ORAL EVERY 4 HOURS PRN
Status: DISCONTINUED | OUTPATIENT
Start: 2022-11-07 | End: 2022-11-07 | Stop reason: HOSPADM

## 2022-11-07 RX ORDER — AMOXICILLIN AND CLAVULANATE POTASSIUM 875; 125 MG/1; MG/1
1 TABLET, FILM COATED ORAL EVERY 12 HOURS
Qty: 20 TABLET | Refills: 0 | Status: SHIPPED | OUTPATIENT
Start: 2022-11-07 | End: 2022-11-17

## 2022-11-07 RX ORDER — ONDANSETRON 4 MG/1
4 TABLET, ORALLY DISINTEGRATING ORAL EVERY 8 HOURS PRN
Qty: 20 TABLET | Refills: 0 | Status: SHIPPED | OUTPATIENT
Start: 2022-11-07 | End: 2023-10-25

## 2022-11-07 RX ORDER — BACITRACIN ZINC 500 UNIT/G
OINTMENT (GRAM) TOPICAL
Status: DISCONTINUED | OUTPATIENT
Start: 2022-11-07 | End: 2022-11-07 | Stop reason: HOSPADM

## 2022-11-07 RX ORDER — SODIUM CHLORIDE, SODIUM LACTATE, POTASSIUM CHLORIDE, CALCIUM CHLORIDE 600; 310; 30; 20 MG/100ML; MG/100ML; MG/100ML; MG/100ML
INJECTION, SOLUTION INTRAVENOUS CONTINUOUS
Status: DISCONTINUED | OUTPATIENT
Start: 2022-11-07 | End: 2022-11-07 | Stop reason: HOSPADM

## 2022-11-07 RX ORDER — PROPOFOL 10 MG/ML
VIAL (ML) INTRAVENOUS
Status: DISCONTINUED | OUTPATIENT
Start: 2022-11-07 | End: 2022-11-07

## 2022-11-07 RX ORDER — ROCURONIUM BROMIDE 10 MG/ML
INJECTION, SOLUTION INTRAVENOUS
Status: DISCONTINUED | OUTPATIENT
Start: 2022-11-07 | End: 2022-11-07

## 2022-11-07 RX ORDER — BACITRACIN 500 [USP'U]/G
OINTMENT TOPICAL 2 TIMES DAILY
Qty: 28 G | Refills: 0 | Status: SHIPPED | OUTPATIENT
Start: 2022-11-07 | End: 2023-10-25

## 2022-11-07 RX ORDER — SODIUM CHLORIDE 0.9 % (FLUSH) 0.9 %
3 SYRINGE (ML) INJECTION
Status: DISCONTINUED | OUTPATIENT
Start: 2022-11-07 | End: 2022-11-07 | Stop reason: HOSPADM

## 2022-11-07 RX ADMIN — DEXAMETHASONE SODIUM PHOSPHATE 12 MG: 4 INJECTION, SOLUTION INTRAMUSCULAR; INTRAVENOUS at 08:11

## 2022-11-07 RX ADMIN — SODIUM CHLORIDE, SODIUM LACTATE, POTASSIUM CHLORIDE, AND CALCIUM CHLORIDE: 600; 310; 30; 20 INJECTION, SOLUTION INTRAVENOUS at 09:11

## 2022-11-07 RX ADMIN — FENTANYL CITRATE 100 MCG: 50 INJECTION, SOLUTION INTRAMUSCULAR; INTRAVENOUS at 08:11

## 2022-11-07 RX ADMIN — SUGAMMADEX 200 MG: 100 INJECTION, SOLUTION INTRAVENOUS at 01:11

## 2022-11-07 RX ADMIN — PHENYLEPHRINE HYDROCHLORIDE 100 MCG: 10 INJECTION INTRAVENOUS at 09:11

## 2022-11-07 RX ADMIN — HYDROMORPHONE HYDROCHLORIDE 0.4 MG: 2 INJECTION INTRAMUSCULAR; INTRAVENOUS; SUBCUTANEOUS at 01:11

## 2022-11-07 RX ADMIN — ROCURONIUM BROMIDE 50 MG: 10 SOLUTION INTRAVENOUS at 08:11

## 2022-11-07 RX ADMIN — OXYCODONE 5 MG: 5 TABLET ORAL at 01:11

## 2022-11-07 RX ADMIN — PHENYLEPHRINE HYDROCHLORIDE 200 MCG: 10 INJECTION INTRAVENOUS at 09:11

## 2022-11-07 RX ADMIN — ONDANSETRON HYDROCHLORIDE 4 MG: 2 INJECTION INTRAMUSCULAR; INTRAVENOUS at 12:11

## 2022-11-07 RX ADMIN — FENTANYL CITRATE 25 MCG: 50 INJECTION, SOLUTION INTRAMUSCULAR; INTRAVENOUS at 12:11

## 2022-11-07 RX ADMIN — ALBUTEROL SULFATE 2.5 MG: 2.5 SOLUTION RESPIRATORY (INHALATION) at 06:11

## 2022-11-07 RX ADMIN — LIDOCAINE HYDROCHLORIDE 100 MG: 20 INJECTION, SOLUTION INTRAVENOUS at 08:11

## 2022-11-07 RX ADMIN — PROPOFOL 150 MG: 10 INJECTION, EMULSION INTRAVENOUS at 08:11

## 2022-11-07 RX ADMIN — ACETAMINOPHEN 1000 MG: 500 TABLET ORAL at 06:11

## 2022-11-07 RX ADMIN — SODIUM CHLORIDE, SODIUM LACTATE, POTASSIUM CHLORIDE, AND CALCIUM CHLORIDE: 600; 310; 30; 20 INJECTION, SOLUTION INTRAVENOUS at 07:11

## 2022-11-07 RX ADMIN — GLYCOPYRROLATE 0.2 MG: 0.2 INJECTION, SOLUTION INTRAMUSCULAR; INTRAVITREAL at 08:11

## 2022-11-07 RX ADMIN — MIDAZOLAM HYDROCHLORIDE 2 MG: 1 INJECTION, SOLUTION INTRAMUSCULAR; INTRAVENOUS at 08:11

## 2022-11-07 RX ADMIN — PHENYLEPHRINE HYDROCHLORIDE 100 MCG: 10 INJECTION INTRAVENOUS at 08:11

## 2022-11-07 RX ADMIN — HYDROMORPHONE HYDROCHLORIDE 0.4 MG: 2 INJECTION INTRAMUSCULAR; INTRAVENOUS; SUBCUTANEOUS at 02:11

## 2022-11-07 RX ADMIN — SODIUM CHLORIDE 2 G: 9 INJECTION, SOLUTION INTRAVENOUS at 12:11

## 2022-11-07 RX ADMIN — ROCURONIUM BROMIDE 20 MG: 10 SOLUTION INTRAVENOUS at 10:11

## 2022-11-07 RX ADMIN — ROCURONIUM BROMIDE 20 MG: 10 SOLUTION INTRAVENOUS at 09:11

## 2022-11-07 RX ADMIN — SODIUM CHLORIDE 2 G: 9 INJECTION, SOLUTION INTRAVENOUS at 08:11

## 2022-11-07 NOTE — PATIENT INSTRUCTIONS
"You can restart your Aspirin in 3 days.   You should take the Augmentin (antibiotics) every day. You can take the zofran for nausea if needed.    Wound care:  - Keep your dressing clean and completely dry  - You should apply bacitracin to your nostril twice daily  - You should use plain nasal saline spray every 4 hours while you are awake    Pain control:  - You can take the Norco if your pain is severe  - You can use ice packs as needed for pain and swelling    Nasal precautions:  - Sneeze with your mouth open  - Do not blow your nose  - No "bearing down" or straining for any activities  - No heavy lifting or strenuous activity    "

## 2022-11-07 NOTE — H&P
OTOLARYNGOLOGY- FACIAL PLASTIC & RECONSTRUCTIVE SURGERY       Silverio Devlin  [id number]     CC:      Chief Complaint   Patient presents with     Nasal obstruction               HISTORY OF PRESENT ILLNESS: Silverio Devlin  is a 57 y.o. male who was  referred by Dr. Florian  for nasal obstruction.  Silverio reports a several year history of difficulty breathing on left and right side. The obstruction is worse on the right side  The obstruction is constant.      There is a history of nasal trauma.  There is not a history of previous nasal surgery.       He relates a long history for many years of disordered nighttime breathing, with associated mouth breathing, snoring, gasping, witnessed apneas, and nonrejuvenating sleep.       He has tried nasal steroid sprays and saline for months at a time in the past with limited relief.  He had a sinus infection last summer but otherwise denies rhinorrhea, postnasal drip, hyposmia, facial pressure or notable sinus infections.  The last course of antibiotics was a long time ago.  He does not regularly use nasal decongestant sprays.     He does not recall previously having allergy testing.     He denies a history of asthma.     He relates a history of reflux symptoms which is managed with over-the-counter medication as needed.       He relates a diagnosis of obstructive sleep apnea without regular CPAP use.  He is awaiting CPAP titration.     He has not had sinonasal surgery.  He has not had a tonsillectomy.     SNOT Questionnaire Total Score 9/28/2022 9/24/2022   SNOT-22 score: 33 33      NOSE score:: (P) 75%        Past Medical History  He has no past medical history on file.     Past Surgical History  He has no past surgical history on file.     Family History  His family history is not on file.     Social History  He reports that he has been smoking. He has never been exposed to tobacco smoke. He has never used smokeless tobacco.     Allergies  He has No Known Allergies.      Medications  He has a current medication list which includes the following prescription(s): aspirin, ibuprofen, and omeprazole.        Review of Systems      Constitutional: Positive for fatigue.       HENT: Positive for voice change.       Respiratory:  Positive for shortness of breath, sleep apnea and snoring.       Cardiovascular:  Negative for chest pain, foot swelling, irregular heartbeat and swollen veins.      Gastrointestinal:  Positive for acid reflux and constipation.      Genitourinary: Positive for frequent urination.      Musc: Positive for aching muscles and neck pain.      Skin: Negative for rash.      Allergy: Negative for food allergies and seasonal allergies.      Endocrine: Negative for cold intolerance and heat intolerance.       Neurological: Positive for headaches.      Hematologic: Positive for bruises/bleeds easily.      Psychiatric: Positive for depression and sleep disturbance.               PHYSICAL EXAM:  /84   Pulse 81   Temp 97.7 °F (36.5 °C) (Temporal)   General: Alert and oriented in no acute distress  Head and Face: Normocephaic, atruamatic  Neurological: Cranial nerves II-XII are grossly intact  Skin: Skin texture is thick rated as Quesada II  Eyes:   EOMI, sclera clear, PERRL  Ears: Pinna are normal in shape and position  EACs clear bilaterally  TMs clear without KARLI or perforation bilaterally  Nose: Nasal skin is thick    On frontal view the dorsum is  irregular with left depressed nasal bone  and slight dorsal lean to the right and the nose is proportional compared to the facial features.  The brow-tip aesthetic line is disrupted bilaterally .  Nasal base width is proportional compared to intercanthal distance.     On base view the tip is trapezoidal in shape.  The caudal septum is midline     On profile view the dorsum is convex with normal projection.  Tip projection is normal and rotation is normal.  Tip support is strong to palpation.  Columellar show is normal.  Alar retraction is not present bilaterally. Hanging columella is mild     Anterior rhinoscopy reveals the septum is S-shaped, .  Deviation, left inferior without perforation or synechiae.  The turbinates are mildly hypertrophic with edematous mucosa  bilaterally.  There are not pathological secretions present.      The external nasal valve is normal and patent  on the right and normal and patent  on the left without dynamic collapse  The internal nasal valve is narrow on the right and narrow on the left.  Modified Yunier maneuver on the right does improve breathing in the  position only  Modified Yunier maneuver on the left does improve breathing in the  position only     Oral cavity and Oropharynx: Mucous membranes moist without lesions present.  Tongue protrudes midline and palate elevates midline.  Neck: Supple without LAD.    Lungs:breathing comfortably  Psychiatric:  Mood and affect are normal              ASSESSMENT:   1. Acquired nasal deformity    2. Deviated nasal septum    3. Nasal valve collapse    4. Nasal turbinate hypertrophy    5. FRANKO (obstructive sleep apnea)              PLAN:      I had a long discussion with the patient regarding his condition and the further workup and management options.      Silverio's nasal obstruction is related to deviated nasal septum, nasal valve collapse, inferior turbinate hypertrophy and significantly impacts his quality of life.  Previous medical treatments including intranasal steroid spray have failed to provide benefit.  There are anatomic abnormalities contributing to the nasal obstruction that require surgical intervention for adequate improvement.     We discussed the operative plan including septoplasty, internal nasal valve repair, inferior turbinate reduction.  This would accomplished via an open approach.  I have prepared Silverio for the possibility of requiring auricular cartilage or rib cartilage graft if needed.     The risks, benefits, and  alternatives of the procedure were discussed in detail including but not limited to bleeding, infection, pain, scar, septal perforation, synechiae, failure to improve, asymmetry or irregularity of the nose, need for repeat procedures, saddle deformity. We also discussed the expected post operative course and time frame for final healing.     We will plan on pursuing surgery on an outpatient basis in the near future.  Silverio expressed understanding of the procedure, had all questions answered, and is interested in proceeding. 23 hour obs given FRANKO

## 2022-11-07 NOTE — BRIEF OP NOTE
[facility] - Surgery ([place])  Brief Operative Note    Surgery Date: 11/7/2022     Surgeon(s) and Role:     * Gwyn Mendez MD - Primary    Assisting Surgeon: Carlee Logan MD    Pre-op Diagnosis:  Acquired nasal deformity [M95.0]  Deviated nasal septum [J34.2]    Post-op Diagnosis:  Post-Op Diagnosis Codes:     * Acquired nasal deformity [M95.0]     * Deviated nasal septum [J34.2]    Procedure(s) (LRB):  REPAIR, STENOSIS, NOSE, VESTIBULE (N/A)  SEPTOPLASTY, NOSE (N/A)  EXCISION, NASAL TURBINATE, SUBMUCOSAL (Bilateral)    Anesthesia: General    Operative Findings: see op note    Estimated Blood Loss: 20 mL         Specimens:   Specimen (24h ago, onward)      None              Discharge Note    OUTCOME: Patient tolerated treatment/procedure well without complication and is now ready for discharge.    DISPOSITION: Home or Self Care    FINAL DIAGNOSIS:  Nasal obstruction    FOLLOWUP: In clinic    DISCHARGE INSTRUCTIONS:    Discharge Procedure Orders   Diet general     Call MD for:  difficulty breathing, headache or visual disturbances     Call MD for:  severe uncontrolled pain     Call MD for:  redness, tenderness, or signs of infection (pain, swelling, redness, odor or green/yellow discharge around incision site)     Leave dressing on - Keep it clean, dry, and intact until clinic visit

## 2022-11-07 NOTE — ANESTHESIA PROCEDURE NOTES
Intubation    Date/Time: 11/7/2022 8:26 AM  Performed by: Migdalia Schneider CRNA  Authorized by: Raymundo Scanlon MD     Intubation:     Induction:  Intravenous    Intubated:  Postinduction    Mask Ventilation:  Easy mask    Attempts:  1    Attempted By:  CRNA    Method of Intubation:  Video laryngoscopy    Blade:  Pablo 3    Laryngeal View Grade: Grade I - full view of cords      Difficult Airway Encountered?: No      Complications:  None    Airway Device:  Oral cora    Airway Device Size:  7.5    Style/Cuff Inflation:  Cuffed (inflated to minimal occlusive pressure)    Secured at:  The lips    Placement Verified By:  Capnometry    Complicating Factors:  None    Findings Post-Intubation:  BS equal bilateral and atraumatic/condition of teeth unchanged

## 2022-11-07 NOTE — ANESTHESIA POSTPROCEDURE EVALUATION
Anesthesia Post Evaluation    Patient: Silverio Devlin    Procedure(s) Performed: Procedure(s) (LRB):  REPAIR, STENOSIS, NOSE, VESTIBULE (N/A)  SEPTOPLASTY, NOSE (N/A)  EXCISION, NASAL TURBINATE, SUBMUCOSAL (Bilateral)    Final Anesthesia Type: general      Patient location during evaluation: PACU  Patient participation: Yes- Able to Participate  Level of consciousness: awake and alert  Post-procedure vital signs: reviewed and stable  Pain management: adequate  Airway patency: patent    PONV status at discharge: No PONV  Anesthetic complications: no      Cardiovascular status: blood pressure returned to baseline  Respiratory status: unassisted and spontaneous ventilation  Hydration status: euvolemic  Follow-up not needed.          Vitals Value Taken Time   /80 11/07/22 1357   Temp 36.2 °C (97.2 °F) 11/07/22 1310   Pulse 68 11/07/22 1405   Resp 18 11/07/22 1345   SpO2 99 % 11/07/22 1405   Vitals shown include unvalidated device data.      No case tracking events are documented in the log.      Pain/Lisa Score: Pain Rating Prior to Med Admin: 8 (11/7/2022  1:31 PM)  Lisa Score: 9 (11/7/2022  1:40 PM)

## 2022-11-07 NOTE — PLAN OF CARE
Silverio Devlin has met all discharge criteria from Phase II. Vital Signs are stable, ambulating  without difficulty. Discharge instructions given, patient verbalized understanding. Discharged from facility via wheelchair in stable condition.

## 2022-11-07 NOTE — TRANSFER OF CARE
Anesthesia Transfer of Care Note    Patient: Silverio Devlin    Procedure(s) Performed: Procedure(s) (LRB):  REPAIR, STENOSIS, NOSE, VESTIBULE (N/A)  SEPTOPLASTY, NOSE (N/A)  EXCISION, NASAL TURBINATE, SUBMUCOSAL (Bilateral)    Patient location: PACU    Anesthesia Type: general    Transport from OR: Transported from OR on 6-10 L/min O2 by face mask with adequate spontaneous ventilation    Post pain: adequate analgesia    Post assessment: no apparent anesthetic complications and tolerated procedure well    Post vital signs: stable    Level of consciousness: awake    Nausea/Vomiting: no nausea/vomiting    Complications: none    Transfer of care protocol was followed      Last vitals:   Visit Vitals  BP (!) 131/97 (BP Location: Left arm, Patient Position: Lying)   Pulse 74   Temp 36.8 °C (98.2 °F) (Oral)   Resp 20   Wt 111.1 kg (245 lb)   SpO2 98%   BMI 36.18 kg/m²

## 2022-11-07 NOTE — DISCHARGE INSTRUCTIONS

## 2022-11-07 NOTE — OP NOTE
Service: Facial Plastic and Reconstructive Surgery    Patient: Silverio Devlin    MRN: [id number]     Date: 11/07/2022     Preoperative Diagnosis:   1. Nasal obstruction  2. Deviated nasal septum  3. Nasal valve collapse  4. Inferior turbinate hypertrophy     Postoperative Diagnosis:   1. Nasal obstruction  2. Deviated nasal septum  3. Nasal valve collapse  4. Inferior turbinate hypertrophy     Surgeon: Gwyn Mendez MD     Resident Surgeon: Carlee Logan MD     Procedures:   1. Repair of nasal valves - CPT 97361  2. Septoplasty - CPT 12149  3. Bilateral inferior turbinate reduction - CPT 55743-92  4. Septal cartilage graft, CPT 95448     Anesthesia: General endotracheal anesthesia       Complications: none     Blood loss: 10 ml     Findings:   Severe septal deviation to the left   Caudal septum deviated to the right    Bilateral  internal valve collapse  Bilateral  inferior turbinate hypertrophy   Right alar retraction, left pinched ala     Specimen: none      Retained items:    Bilateral Todd splints secured with 3-0 nylon suture     Grafts:  bilateral  grafts - septal cartilage; left extended-/caudal extension graft  Septal batten graft - ethmoid bone  Bilateral rim graft - septal cartilage     Indications: Silverio Devlin is a pleasant 57 y.o. male who presented with a long history of nasal obstruction. he did have a prior history of nasal trauma, and he did not have a prior septoplasty.  Despite medical management, he has had continued nasal obstruction.  After exam and discussion with the patient, we discussed the risks, benefits and alternatives of the above stated the procedure. he wished to proceed with the aforementioned procedure; written consent was obtained.     Operation:  The patient was properly identified in the holding area where informed written consent was obtained. The patient was brought back to the operating suite and placed supine on the operating room table.  The patient was  intubated orally without difficulty. The bed was rotated 90 degrees. A timeout was performed. The bilateral nares were decongested with cocaine-soaked pledgettes.  The septum, columella, nasal dorsum and vestibule were infiltrated with a total of 10 cc of 1% lidocaine with epinephrine 1:100,000.  The patient was prepped and draped in the normal sterile fashion.       The zero degree endoscope was then used to perform the inferior turbinate reduction. A small incision on the head of the inferior turbinate was made with the 15 blade bilaterally. The caudal elevated was used to elevated along the inferior turbinate bones. The suction microdebrider was then used to perform bilateral submucosal resection of the inferior turbinates. The zarco elevator was then used to outfracture the inferior turbinates bilaterally.     We then turned our attention to the septorhinoplasty portion of the case.  The 11 blade was used to make an inverted-V incision across the columella. This was connected bilaterally to marginal incisions.  The soft tissue envelope was elevated and the nasal bone periosteum was identified.     The septum was then address.  Due to the severely deviated caudal septum, an open septoplasty approach was utilized. The lower lateral cartilages were reflected laterally and mucoperichondrial septal flaps were elevated bilaterally. The septum was noted to deviated to the left with a spur on the left.The upper lateral cartilages were then  from the septum using the 15 blade. The bony-cartilaginous junction was stippled while preserving the keystone area. Deviated bony septum was removed with double-action scissors and Corbin forceps. The bony septum was saved in saline. A 1.5 L strut was kept intact, and a 15 blade was used to harvest our septal graft.  This was removed and placed in saline on the back table.  The caudal septum was deviated and off the spine. This was completely freed from the spine.  Excess cartilage was trimmed and the septum was secured to the spine with 5-0 PDS suture. The caudal L-strut remained deviated to the left thus we elected to perform a septal batten graft. The bony septum was trimmed with heavy scissors on the back table. Multiple holes were made in the graft with an 18 gauge needle. The ethmoid bone graft was secured to the caudal/dorsal septum with 5-0 PDS sutures. This straightened and strengthened the L-strut. The septal flaps were redraped and the septum was noted to be straight. A drainage port was created on the right side.     The septal cartilage was then carved to create bilateral  grafts for the internal nasal valves. These were secured with 5-0 PDS sutures in a horizontal mattress fashion between the septum and the upper lateral cartilages. A extended-/caudal extension combination graft was used on the left side. The caudal extension graft portion was secured to the caudal septum with 5-0 PDS. This straightened the middle third of the nose and improved the internal nasal valve.     The lower lateral cartilages were then secured to the caudal septal extension graft  in a tongue-and-groove fashion using 5-0 PDS sutures. Bilateral pockets were created for the external valve/alar retraction. Bilateral rim graft were carved from the septal cartilage was slid into the pockets. This improved the alar contour and decreased retraction.  The columellar incision was then closed with 5-0 fast-gut suture in a simple, interrupted fashion. The marginal incision was closed with 5-0 chromic suture. A coapting 4-0 chromic suture was placed to re-approximate the septal flaps.      Bilateral soto splints were placed and secured with 3-0 nylon suture. The patient was then cleaned and mastisol, steri-strips and the nasal aquaplast splint were placed. An OG tube was used to empty the gastric contents.  The patient was turned back to anesthesia where he was awaked and extubated  without complications. he was transported to the PACU in good condition.    I was present and participatory for the entire procedure.

## 2022-11-08 VITALS
SYSTOLIC BLOOD PRESSURE: 160 MMHG | WEIGHT: 245 LBS | BODY MASS INDEX: 36.18 KG/M2 | RESPIRATION RATE: 17 BRPM | OXYGEN SATURATION: 99 % | TEMPERATURE: 99 F | DIASTOLIC BLOOD PRESSURE: 78 MMHG | HEART RATE: 76 BPM

## 2022-11-09 ENCOUNTER — TELEPHONE (OUTPATIENT)
Dept: OTOLARYNGOLOGY | Facility: CLINIC | Age: 57
End: 2022-11-09
Payer: COMMERCIAL

## 2022-11-15 ENCOUNTER — OFFICE VISIT (OUTPATIENT)
Dept: OTOLARYNGOLOGY | Facility: CLINIC | Age: 57
End: 2022-11-15
Payer: COMMERCIAL

## 2022-11-15 VITALS
DIASTOLIC BLOOD PRESSURE: 91 MMHG | WEIGHT: 245 LBS | HEIGHT: 69 IN | SYSTOLIC BLOOD PRESSURE: 136 MMHG | BODY MASS INDEX: 36.29 KG/M2 | HEART RATE: 76 BPM | TEMPERATURE: 98 F

## 2022-11-15 DIAGNOSIS — J34.2 DEVIATED NASAL SEPTUM: ICD-10-CM

## 2022-11-15 DIAGNOSIS — M95.0 ACQUIRED NASAL DEFORMITY: Primary | ICD-10-CM

## 2022-11-15 DIAGNOSIS — M95.0 NASAL VALVE COLLAPSE: ICD-10-CM

## 2022-11-15 DIAGNOSIS — J34.3 NASAL TURBINATE HYPERTROPHY: ICD-10-CM

## 2022-11-15 DIAGNOSIS — G47.33 OSA (OBSTRUCTIVE SLEEP APNEA): ICD-10-CM

## 2022-11-15 PROCEDURE — 3075F PR MOST RECENT SYSTOLIC BLOOD PRESS GE 130-139MM HG: ICD-10-PCS | Mod: CPTII,S$GLB,, | Performed by: OTOLARYNGOLOGY

## 2022-11-15 PROCEDURE — 3075F SYST BP GE 130 - 139MM HG: CPT | Mod: CPTII,S$GLB,, | Performed by: OTOLARYNGOLOGY

## 2022-11-15 PROCEDURE — 1160F RVW MEDS BY RX/DR IN RCRD: CPT | Mod: CPTII,S$GLB,, | Performed by: OTOLARYNGOLOGY

## 2022-11-15 PROCEDURE — 3008F PR BODY MASS INDEX (BMI) DOCUMENTED: ICD-10-PCS | Mod: CPTII,S$GLB,, | Performed by: OTOLARYNGOLOGY

## 2022-11-15 PROCEDURE — 1159F PR MEDICATION LIST DOCUMENTED IN MEDICAL RECORD: ICD-10-PCS | Mod: CPTII,S$GLB,, | Performed by: OTOLARYNGOLOGY

## 2022-11-15 PROCEDURE — 99024 PR POST-OP FOLLOW-UP VISIT: ICD-10-PCS | Mod: S$GLB,,, | Performed by: OTOLARYNGOLOGY

## 2022-11-15 PROCEDURE — 1159F MED LIST DOCD IN RCRD: CPT | Mod: CPTII,S$GLB,, | Performed by: OTOLARYNGOLOGY

## 2022-11-15 PROCEDURE — 99024 POSTOP FOLLOW-UP VISIT: CPT | Mod: S$GLB,,, | Performed by: OTOLARYNGOLOGY

## 2022-11-15 PROCEDURE — 1160F PR REVIEW ALL MEDS BY PRESCRIBER/CLIN PHARMACIST DOCUMENTED: ICD-10-PCS | Mod: CPTII,S$GLB,, | Performed by: OTOLARYNGOLOGY

## 2022-11-15 PROCEDURE — 3080F PR MOST RECENT DIASTOLIC BLOOD PRESSURE >= 90 MM HG: ICD-10-PCS | Mod: CPTII,S$GLB,, | Performed by: OTOLARYNGOLOGY

## 2022-11-15 PROCEDURE — 3008F BODY MASS INDEX DOCD: CPT | Mod: CPTII,S$GLB,, | Performed by: OTOLARYNGOLOGY

## 2022-11-15 PROCEDURE — 3080F DIAST BP >= 90 MM HG: CPT | Mod: CPTII,S$GLB,, | Performed by: OTOLARYNGOLOGY

## 2022-11-15 RX ORDER — HYDROCODONE BITARTRATE AND ACETAMINOPHEN 5; 325 MG/1; MG/1
1 TABLET ORAL EVERY 6 HOURS PRN
Qty: 16 TABLET | Refills: 0 | Status: SHIPPED | OUTPATIENT
Start: 2022-11-15 | End: 2023-10-25

## 2022-11-15 NOTE — PROGRESS NOTES
"  Subjective:     Chief Complaint:   Chief Complaint   Patient presents with    Post-op Evaluation         Silverio Devlin is a 57 y.o. male who present for postoperative visit.     Patient underwent nasal valve repair, septoplasty, SMRT on 11/7/22.   Since then, he has been doing well. Continued pain.     Past Medical History  He has a past medical history of Headache and Heartburn.    Past Surgical History  He has a past surgical history that includes Nasal stenosis repair (N/A, 11/7/2022) and Nasal septoplasty (N/A, 11/7/2022).    Family History  His family history is not on file.    Social History  He reports that he has been smoking. He has never been exposed to tobacco smoke. He has never used smokeless tobacco. He reports that he does not currently use alcohol. He reports current drug use. Drug: Marijuana.    Allergies  He has No Known Allergies.    Medications  He has a current medication list which includes the following prescription(s): amoxicillin-clavulanate 875-125mg, bacitracin, omeprazole, aspirin, hydrocodone-acetaminophen, ibuprofen, and ondansetron.    Answers submitted by the patient for this visit:  Review of Symptoms Questionnaire  (Submitted on 11/10/2022)  None of these: Yes  None of these: Yes  None of these : Yes  None of these: Yes  None of these : Yes  None of these: Yes  None of these: Yes  None of these: Yes  None of these : Yes  None of these: Yes  None of these : Yes  None of these: Yes  None of these: Yes  None of these: Yes       Objective:     BP (!) 136/91 (BP Location: Left arm, Patient Position: Sitting)   Pulse 76   Temp 97.7 °F (36.5 °C)   Ht 5' 9" (1.753 m)   Wt 111.1 kg (245 lb)   BMI 36.18 kg/m²      Constitutional:   Vital signs are normal. He appears well-developed and well-nourished.     Head:  Normocephalic and atraumatic.     Nose:                 Assessment:     1. Acquired nasal deformity    2. Deviated nasal septum    3. Nasal valve collapse    4. Nasal turbinate " hypertrophy    5. FRANKO (obstructive sleep apnea)          Plan:     Patient is doing well postoperatively. We discussed postop instructions including nasal saline q4 hours while awake, aquaphor or vaseline twice a day to incision and within nostrils. No nose blowing x 3 weeks. Discussed postop activity limitations. Follow up in 3 weeks, sooner if needed.

## 2022-12-08 ENCOUNTER — OFFICE VISIT (OUTPATIENT)
Dept: OTOLARYNGOLOGY | Facility: CLINIC | Age: 57
End: 2022-12-08
Payer: COMMERCIAL

## 2022-12-08 VITALS
DIASTOLIC BLOOD PRESSURE: 84 MMHG | WEIGHT: 253 LBS | HEART RATE: 67 BPM | SYSTOLIC BLOOD PRESSURE: 127 MMHG | BODY MASS INDEX: 37.47 KG/M2 | HEIGHT: 69 IN

## 2022-12-08 DIAGNOSIS — J34.2 DEVIATED NASAL SEPTUM: ICD-10-CM

## 2022-12-08 DIAGNOSIS — M95.0 NASAL VALVE COLLAPSE: ICD-10-CM

## 2022-12-08 DIAGNOSIS — G47.33 OSA (OBSTRUCTIVE SLEEP APNEA): ICD-10-CM

## 2022-12-08 DIAGNOSIS — J34.3 NASAL TURBINATE HYPERTROPHY: ICD-10-CM

## 2022-12-08 DIAGNOSIS — M95.0 ACQUIRED NASAL DEFORMITY: Primary | ICD-10-CM

## 2022-12-08 PROCEDURE — 3074F SYST BP LT 130 MM HG: CPT | Mod: CPTII,S$GLB,, | Performed by: OTOLARYNGOLOGY

## 2022-12-08 PROCEDURE — 3074F PR MOST RECENT SYSTOLIC BLOOD PRESSURE < 130 MM HG: ICD-10-PCS | Mod: CPTII,S$GLB,, | Performed by: OTOLARYNGOLOGY

## 2022-12-08 PROCEDURE — 1159F PR MEDICATION LIST DOCUMENTED IN MEDICAL RECORD: ICD-10-PCS | Mod: CPTII,S$GLB,, | Performed by: OTOLARYNGOLOGY

## 2022-12-08 PROCEDURE — 1160F RVW MEDS BY RX/DR IN RCRD: CPT | Mod: CPTII,S$GLB,, | Performed by: OTOLARYNGOLOGY

## 2022-12-08 PROCEDURE — 3008F BODY MASS INDEX DOCD: CPT | Mod: CPTII,S$GLB,, | Performed by: OTOLARYNGOLOGY

## 2022-12-08 PROCEDURE — 1160F PR REVIEW ALL MEDS BY PRESCRIBER/CLIN PHARMACIST DOCUMENTED: ICD-10-PCS | Mod: CPTII,S$GLB,, | Performed by: OTOLARYNGOLOGY

## 2022-12-08 PROCEDURE — 99024 POSTOP FOLLOW-UP VISIT: CPT | Mod: S$GLB,,, | Performed by: OTOLARYNGOLOGY

## 2022-12-08 PROCEDURE — 99024 PR POST-OP FOLLOW-UP VISIT: ICD-10-PCS | Mod: S$GLB,,, | Performed by: OTOLARYNGOLOGY

## 2022-12-08 PROCEDURE — 3079F PR MOST RECENT DIASTOLIC BLOOD PRESSURE 80-89 MM HG: ICD-10-PCS | Mod: CPTII,S$GLB,, | Performed by: OTOLARYNGOLOGY

## 2022-12-08 PROCEDURE — 1159F MED LIST DOCD IN RCRD: CPT | Mod: CPTII,S$GLB,, | Performed by: OTOLARYNGOLOGY

## 2022-12-08 PROCEDURE — 3008F PR BODY MASS INDEX (BMI) DOCUMENTED: ICD-10-PCS | Mod: CPTII,S$GLB,, | Performed by: OTOLARYNGOLOGY

## 2022-12-08 PROCEDURE — 3079F DIAST BP 80-89 MM HG: CPT | Mod: CPTII,S$GLB,, | Performed by: OTOLARYNGOLOGY

## 2022-12-08 NOTE — PROGRESS NOTES
"  Subjective:     Chief Complaint:   Chief Complaint   Patient presents with    Other     Post opt         Silverio Devlin is a 57 y.o. male who present for postoperative visit.     Patient underwent nasal valve repair, septoplasty, SMRT on 11/7/22.   Since then, he has been doing well. Pain resolved.    Past Medical History  He has a past medical history of Headache and Heartburn.    Past Surgical History  He has a past surgical history that includes Nasal stenosis repair (N/A, 11/7/2022) and Nasal septoplasty (N/A, 11/7/2022).    Family History  His family history is not on file.    Social History  He reports that he has been smoking. He has never been exposed to tobacco smoke. He has never used smokeless tobacco. He reports that he does not currently use alcohol. He reports current drug use. Drug: Marijuana.    Allergies  He has No Known Allergies.    Medications  He has a current medication list which includes the following prescription(s): aspirin, ibuprofen, omeprazole, bacitracin, hydrocodone-acetaminophen, and ondansetron.         Objective:     /84 (BP Location: Left arm, Patient Position: Sitting, BP Method: Medium (Manual))   Pulse 67   Ht 5' 9" (1.753 m)   Wt 114.8 kg (253 lb)   BMI 37.36 kg/m²      Constitutional:   Vital signs are normal. He appears well-developed and well-nourished.     Head:  Normocephalic and atraumatic.     Nose:                 Assessment:     1. Acquired nasal deformity    2. Deviated nasal septum    3. Nasal valve collapse    4. Nasal turbinate hypertrophy    5. FRANKO (obstructive sleep apnea)        S/p nasal valve repair, septoplasty, SMRT on 11/7/22    Plan:     Patient is doing well postoperatively.  Saline BID. Follow up in 2 months, sooner if needed.           Answers submitted by the patient for this visit:  Review of Symptoms Questionnaire  (Submitted on 12/6/2022)  None of these: Yes  None of these : Yes  None of these: Yes  None of these : Yes  None of these: " Yes  None of these: Yes  None of these: Yes  None of these : Yes  None of these: Yes  None of these : Yes  None of these: Yes  None of these: Yes  None of these: Yes

## 2023-02-09 ENCOUNTER — OFFICE VISIT (OUTPATIENT)
Dept: OTOLARYNGOLOGY | Facility: CLINIC | Age: 58
End: 2023-02-09
Payer: COMMERCIAL

## 2023-02-09 VITALS
TEMPERATURE: 98 F | HEART RATE: 92 BPM | DIASTOLIC BLOOD PRESSURE: 104 MMHG | SYSTOLIC BLOOD PRESSURE: 167 MMHG | BODY MASS INDEX: 37.31 KG/M2 | WEIGHT: 252.63 LBS

## 2023-02-09 DIAGNOSIS — G47.33 OSA (OBSTRUCTIVE SLEEP APNEA): ICD-10-CM

## 2023-02-09 DIAGNOSIS — M95.0 NASAL VALVE COLLAPSE: ICD-10-CM

## 2023-02-09 DIAGNOSIS — J34.2 DEVIATED NASAL SEPTUM: ICD-10-CM

## 2023-02-09 DIAGNOSIS — J30.89 NON-SEASONAL ALLERGIC RHINITIS, UNSPECIFIED TRIGGER: ICD-10-CM

## 2023-02-09 DIAGNOSIS — J34.3 NASAL TURBINATE HYPERTROPHY: ICD-10-CM

## 2023-02-09 DIAGNOSIS — M95.0 ACQUIRED NASAL DEFORMITY: Primary | ICD-10-CM

## 2023-02-09 PROCEDURE — 3008F PR BODY MASS INDEX (BMI) DOCUMENTED: ICD-10-PCS | Mod: CPTII,S$GLB,, | Performed by: OTOLARYNGOLOGY

## 2023-02-09 PROCEDURE — 99024 PR POST-OP FOLLOW-UP VISIT: ICD-10-PCS | Mod: S$GLB,,, | Performed by: OTOLARYNGOLOGY

## 2023-02-09 PROCEDURE — 1159F MED LIST DOCD IN RCRD: CPT | Mod: CPTII,S$GLB,, | Performed by: OTOLARYNGOLOGY

## 2023-02-09 PROCEDURE — 1159F PR MEDICATION LIST DOCUMENTED IN MEDICAL RECORD: ICD-10-PCS | Mod: CPTII,S$GLB,, | Performed by: OTOLARYNGOLOGY

## 2023-02-09 PROCEDURE — 3008F BODY MASS INDEX DOCD: CPT | Mod: CPTII,S$GLB,, | Performed by: OTOLARYNGOLOGY

## 2023-02-09 PROCEDURE — 3080F DIAST BP >= 90 MM HG: CPT | Mod: CPTII,S$GLB,, | Performed by: OTOLARYNGOLOGY

## 2023-02-09 PROCEDURE — 1160F RVW MEDS BY RX/DR IN RCRD: CPT | Mod: CPTII,S$GLB,, | Performed by: OTOLARYNGOLOGY

## 2023-02-09 PROCEDURE — 1160F PR REVIEW ALL MEDS BY PRESCRIBER/CLIN PHARMACIST DOCUMENTED: ICD-10-PCS | Mod: CPTII,S$GLB,, | Performed by: OTOLARYNGOLOGY

## 2023-02-09 PROCEDURE — 99999 PR PBB SHADOW E&M-EST. PATIENT-LVL III: CPT | Mod: PBBFAC,,, | Performed by: OTOLARYNGOLOGY

## 2023-02-09 PROCEDURE — 3077F PR MOST RECENT SYSTOLIC BLOOD PRESSURE >= 140 MM HG: ICD-10-PCS | Mod: CPTII,S$GLB,, | Performed by: OTOLARYNGOLOGY

## 2023-02-09 PROCEDURE — 3077F SYST BP >= 140 MM HG: CPT | Mod: CPTII,S$GLB,, | Performed by: OTOLARYNGOLOGY

## 2023-02-09 PROCEDURE — 99024 POSTOP FOLLOW-UP VISIT: CPT | Mod: S$GLB,,, | Performed by: OTOLARYNGOLOGY

## 2023-02-09 PROCEDURE — 3080F PR MOST RECENT DIASTOLIC BLOOD PRESSURE >= 90 MM HG: ICD-10-PCS | Mod: CPTII,S$GLB,, | Performed by: OTOLARYNGOLOGY

## 2023-02-09 PROCEDURE — 99999 PR PBB SHADOW E&M-EST. PATIENT-LVL III: ICD-10-PCS | Mod: PBBFAC,,, | Performed by: OTOLARYNGOLOGY

## 2023-02-09 RX ORDER — AZELASTINE 1 MG/ML
2 SPRAY, METERED NASAL 2 TIMES DAILY
Qty: 30 ML | Refills: 3 | Status: SHIPPED | OUTPATIENT
Start: 2023-02-09 | End: 2023-09-18

## 2023-02-09 NOTE — PROGRESS NOTES
Subjective:     Chief Complaint:   Chief Complaint   Patient presents with    Follow-up     c/o sinus pressure         Silverio Devlin is a 57 y.o. male who present for postoperative visit.     Patient underwent nasal valve repair, septoplasty, SMRT on 11/7/22.   Since then, he has been doing well. Breathing well.  [place] feeling slightly blocked on the right side.  Reports allergy symptoms intermittently.  Recently came down with URI symptoms    Past Medical History  He has a past medical history of Headache and Heartburn.    Past Surgical History  He has a past surgical history that includes Nasal stenosis repair (N/A, 11/7/2022) and Nasal septoplasty (N/A, 11/7/2022).    Family History  His family history is not on file.    Social History  He reports that he has been smoking. He has never been exposed to tobacco smoke. He has never used smokeless tobacco. He reports that he does not currently use alcohol. He reports current drug use. Drug: Marijuana.    Allergies  He has No Known Allergies.    Medications  He has a current medication list which includes the following prescription(s): aspirin, ibuprofen, omeprazole, bacitracin, hydrocodone-acetaminophen, and ondansetron.    Answers submitted by the patient for this visit:  Review of Symptoms Questionnaire  (Submitted on 2/6/2023)  None of these: Yes  None of these: Yes  None of these : Yes  None of these: Yes  None of these : Yes  None of these: Yes  None of these: Yes  None of these: Yes  None of these : Yes  None of these: Yes  None of these : Yes  None of these: Yes  None of these: Yes  None of these: Yes       Objective:     BP (!) 167/104   Pulse 92   Temp 98 °F (36.7 °C)   Wt 114.6 kg (252 lb 10.4 oz)   BMI 37.31 kg/m²      Constitutional:   Vital signs are normal. He appears well-developed and well-nourished.     Head:  Normocephalic and atraumatic.     Nose:                 Assessment:     1. Acquired nasal deformity    2. Deviated nasal septum    3.  Nasal valve collapse    4. Nasal turbinate hypertrophy    5. FRANKO (obstructive sleep apnea)          S/p nasal valve repair, septoplasty, SMRT on 11/7/22    Plan:     Patient is doing well postoperatively.  Saline and Astelin as needed.  Follow up in 9 months, sooner if needed. All questions answered and patient encouraged to call with any questions or concerns.

## 2023-08-05 DIAGNOSIS — J30.89 NON-SEASONAL ALLERGIC RHINITIS, UNSPECIFIED TRIGGER: ICD-10-CM

## 2023-09-12 ENCOUNTER — OFFICE VISIT (OUTPATIENT)
Dept: UROLOGY | Facility: CLINIC | Age: 58
End: 2023-09-12
Payer: COMMERCIAL

## 2023-09-12 VITALS
BODY MASS INDEX: 36.83 KG/M2 | WEIGHT: 248.69 LBS | OXYGEN SATURATION: 98 % | HEART RATE: 88 BPM | DIASTOLIC BLOOD PRESSURE: 103 MMHG | SYSTOLIC BLOOD PRESSURE: 151 MMHG | HEIGHT: 69 IN

## 2023-09-12 DIAGNOSIS — R36.1 HEMATOSPERMIA: Primary | ICD-10-CM

## 2023-09-12 DIAGNOSIS — Z12.5 SCREENING PSA (PROSTATE SPECIFIC ANTIGEN): ICD-10-CM

## 2023-09-12 PROCEDURE — 3080F PR MOST RECENT DIASTOLIC BLOOD PRESSURE >= 90 MM HG: ICD-10-PCS | Mod: CPTII,S$GLB,, | Performed by: STUDENT IN AN ORGANIZED HEALTH CARE EDUCATION/TRAINING PROGRAM

## 2023-09-12 PROCEDURE — 99203 OFFICE O/P NEW LOW 30 MIN: CPT | Mod: S$GLB,,, | Performed by: STUDENT IN AN ORGANIZED HEALTH CARE EDUCATION/TRAINING PROGRAM

## 2023-09-12 PROCEDURE — 3008F PR BODY MASS INDEX (BMI) DOCUMENTED: ICD-10-PCS | Mod: CPTII,S$GLB,, | Performed by: STUDENT IN AN ORGANIZED HEALTH CARE EDUCATION/TRAINING PROGRAM

## 2023-09-12 PROCEDURE — 3008F BODY MASS INDEX DOCD: CPT | Mod: CPTII,S$GLB,, | Performed by: STUDENT IN AN ORGANIZED HEALTH CARE EDUCATION/TRAINING PROGRAM

## 2023-09-12 PROCEDURE — 3077F SYST BP >= 140 MM HG: CPT | Mod: CPTII,S$GLB,, | Performed by: STUDENT IN AN ORGANIZED HEALTH CARE EDUCATION/TRAINING PROGRAM

## 2023-09-12 PROCEDURE — 1159F PR MEDICATION LIST DOCUMENTED IN MEDICAL RECORD: ICD-10-PCS | Mod: CPTII,S$GLB,, | Performed by: STUDENT IN AN ORGANIZED HEALTH CARE EDUCATION/TRAINING PROGRAM

## 2023-09-12 PROCEDURE — 1159F MED LIST DOCD IN RCRD: CPT | Mod: CPTII,S$GLB,, | Performed by: STUDENT IN AN ORGANIZED HEALTH CARE EDUCATION/TRAINING PROGRAM

## 2023-09-12 PROCEDURE — 3080F DIAST BP >= 90 MM HG: CPT | Mod: CPTII,S$GLB,, | Performed by: STUDENT IN AN ORGANIZED HEALTH CARE EDUCATION/TRAINING PROGRAM

## 2023-09-12 PROCEDURE — 99203 PR OFFICE/OUTPT VISIT, NEW, LEVL III, 30-44 MIN: ICD-10-PCS | Mod: S$GLB,,, | Performed by: STUDENT IN AN ORGANIZED HEALTH CARE EDUCATION/TRAINING PROGRAM

## 2023-09-12 PROCEDURE — 3077F PR MOST RECENT SYSTOLIC BLOOD PRESSURE >= 140 MM HG: ICD-10-PCS | Mod: CPTII,S$GLB,, | Performed by: STUDENT IN AN ORGANIZED HEALTH CARE EDUCATION/TRAINING PROGRAM

## 2023-09-12 NOTE — PROGRESS NOTES
"[facility]   Clinic Note    SUBJECTIVE:     Chief Complaint: blood in semen    History of Present Illness:  Silverio Devlin is a 58 y.o. male who presents to clinic for blood in the semen.  He is new to our clinic, referred by Aaareferral Self.    This has been happening since six weeks ago, improving in quality. This occurred after abdominal straining while playing bowling.   Denies gross hematuria. Denies LUTS.     Anticoagulation:  Yes  mg    OBJECTIVE:     Estimated body mass index is 36.72 kg/m² as calculated from the following:    Height as of this encounter: 5' 9" (1.753 m).    Weight as of this encounter: 112.8 kg (248 lb 10.9 oz).    Vital Signs (Most Recent)  Pulse: 88 (09/12/23 1553)  BP: (!) 151/103 (09/12/23 1553)  SpO2: 98 % (09/12/23 1553)    Physical Exam  Constitutional:       Appearance: Normal appearance.   HENT:      Head: Normocephalic and atraumatic.   Eyes:      Conjunctiva/sclera: Conjunctivae normal.   Cardiovascular:      Rate and Rhythm: Normal rate.   Pulmonary:      Effort: Pulmonary effort is normal.   Abdominal:      General: Abdomen is flat. There is no distension.      Tenderness: There is no abdominal tenderness.   Musculoskeletal:         General: Normal range of motion.      Cervical back: Normal range of motion.   Skin:     General: Skin is warm and dry.   Neurological:      General: No focal deficit present.      Mental Status: He is alert and oriented to person, place, and time.   Psychiatric:         Mood and Affect: Mood normal.         Behavior: Behavior normal.         Thought Content: Thought content normal.         Judgment: Judgment normal.         Lab Results   Component Value Date    BUN 15 10/26/2022    CREATININE 0.9 10/26/2022    WBC 6.70 10/26/2022    HGB 14.3 10/26/2022    HCT 42.7 10/26/2022     10/26/2022    AST 68 (H) 11/08/2010    ALT 42 11/08/2010    ALKPHOS 69 11/08/2010    ALBUMIN 4.1 11/08/2010        No results found for: "PSA", " ""PSADIAG", "PSAFREE", "PSAFREEPCT", "PSARAT"    Urine dip showed no blood, leukocyte esterase, and nitrite. Negative protein.     ASSESSMENT     1. Hematospermia    2. Screening PSA (prostate specific antigen)      PLAN:   1. Hematospermia    2. Screening PSA (prostate specific antigen)  -     PSA, Screening; Future; Expected date: 09/12/2023       Will obtain screening PSA.  Reassured patient that hematospermia is benign and usually self-limited. It will likely resolve on its own and does not need further workup or treatment.    Mateo Reyes MD     Letter to Self, Aaareferral      "

## 2023-09-14 ENCOUNTER — LAB VISIT (OUTPATIENT)
Dept: LAB | Facility: OTHER | Age: 58
End: 2023-09-14
Attending: STUDENT IN AN ORGANIZED HEALTH CARE EDUCATION/TRAINING PROGRAM
Payer: COMMERCIAL

## 2023-09-14 DIAGNOSIS — Z12.5 SCREENING PSA (PROSTATE SPECIFIC ANTIGEN): ICD-10-CM

## 2023-09-14 LAB — COMPLEXED PSA SERPL-MCNC: 0.77 NG/ML (ref 0–4)

## 2023-09-14 PROCEDURE — 84153 ASSAY OF PSA TOTAL: CPT | Performed by: STUDENT IN AN ORGANIZED HEALTH CARE EDUCATION/TRAINING PROGRAM

## 2023-09-14 PROCEDURE — 36415 COLL VENOUS BLD VENIPUNCTURE: CPT | Performed by: STUDENT IN AN ORGANIZED HEALTH CARE EDUCATION/TRAINING PROGRAM

## 2023-09-18 RX ORDER — AZELASTINE 1 MG/ML
SPRAY, METERED NASAL
Qty: 30 ML | Refills: 1 | Status: SHIPPED | OUTPATIENT
Start: 2023-09-18 | End: 2023-10-23

## 2023-10-16 ENCOUNTER — TELEPHONE (OUTPATIENT)
Dept: OTOLARYNGOLOGY | Facility: CLINIC | Age: 58
End: 2023-10-16
Payer: COMMERCIAL

## 2023-10-16 ENCOUNTER — PATIENT MESSAGE (OUTPATIENT)
Dept: OTOLARYNGOLOGY | Facility: CLINIC | Age: 58
End: 2023-10-16
Payer: COMMERCIAL

## 2023-10-16 NOTE — TELEPHONE ENCOUNTER
----- Message from Jaqueline Sims sent at 10/16/2023 12:28 PM CDT -----  Regarding: Obstruction after reconstruction, right nostril, not close to airflow on left nostril  Contact: @[phone]  Regarding:Obstruction after reconstruction, right nostril, not close to airflow on left nostril, patient needs to establish care- last seen by Dr. Mendez in February- had seen Dr. Bush referenced as a balloon sinuplasty provider    Contact: Silverio    Type: Call back and advise    Who Called: Silverio    Would the patient rather a call back or a response via [facility]? Phone call, [facility] message    Best Call Back Number: [phone]

## 2023-10-23 ENCOUNTER — IMMUNIZATION (OUTPATIENT)
Dept: INTERNAL MEDICINE | Facility: CLINIC | Age: 58
End: 2023-10-23
Payer: COMMERCIAL

## 2023-10-23 ENCOUNTER — OFFICE VISIT (OUTPATIENT)
Dept: OTOLARYNGOLOGY | Facility: CLINIC | Age: 58
End: 2023-10-23
Payer: COMMERCIAL

## 2023-10-23 VITALS — BODY MASS INDEX: 36.18 KG/M2 | WEIGHT: 245 LBS

## 2023-10-23 DIAGNOSIS — J34.89 NASAL OBSTRUCTION: Primary | ICD-10-CM

## 2023-10-23 DIAGNOSIS — J30.1 NON-SEASONAL ALLERGIC RHINITIS DUE TO POLLEN: ICD-10-CM

## 2023-10-23 DIAGNOSIS — Z98.890 HISTORY OF NASAL SURGERY: ICD-10-CM

## 2023-10-23 PROCEDURE — 99999 PR PBB SHADOW E&M-EST. PATIENT-LVL III: CPT | Mod: PBBFAC,,, | Performed by: OTOLARYNGOLOGY

## 2023-10-23 PROCEDURE — 1159F PR MEDICATION LIST DOCUMENTED IN MEDICAL RECORD: ICD-10-PCS | Mod: CPTII,S$GLB,, | Performed by: OTOLARYNGOLOGY

## 2023-10-23 PROCEDURE — 90471 FLU VACCINE (QUAD) GREATER THAN OR EQUAL TO 3YO PRESERVATIVE FREE IM: ICD-10-PCS | Mod: S$GLB,,, | Performed by: INTERNAL MEDICINE

## 2023-10-23 PROCEDURE — 99999 PR PBB SHADOW E&M-EST. PATIENT-LVL III: ICD-10-PCS | Mod: PBBFAC,,, | Performed by: OTOLARYNGOLOGY

## 2023-10-23 PROCEDURE — 90686 IIV4 VACC NO PRSV 0.5 ML IM: CPT | Mod: S$GLB,,, | Performed by: INTERNAL MEDICINE

## 2023-10-23 PROCEDURE — 99214 OFFICE O/P EST MOD 30 MIN: CPT | Mod: S$GLB,,, | Performed by: OTOLARYNGOLOGY

## 2023-10-23 PROCEDURE — 1160F RVW MEDS BY RX/DR IN RCRD: CPT | Mod: CPTII,S$GLB,, | Performed by: OTOLARYNGOLOGY

## 2023-10-23 PROCEDURE — 3008F PR BODY MASS INDEX (BMI) DOCUMENTED: ICD-10-PCS | Mod: CPTII,S$GLB,, | Performed by: OTOLARYNGOLOGY

## 2023-10-23 PROCEDURE — 1159F MED LIST DOCD IN RCRD: CPT | Mod: CPTII,S$GLB,, | Performed by: OTOLARYNGOLOGY

## 2023-10-23 PROCEDURE — 1160F PR REVIEW ALL MEDS BY PRESCRIBER/CLIN PHARMACIST DOCUMENTED: ICD-10-PCS | Mod: CPTII,S$GLB,, | Performed by: OTOLARYNGOLOGY

## 2023-10-23 PROCEDURE — 99214 PR OFFICE/OUTPT VISIT, EST, LEVL IV, 30-39 MIN: ICD-10-PCS | Mod: S$GLB,,, | Performed by: OTOLARYNGOLOGY

## 2023-10-23 PROCEDURE — 3008F BODY MASS INDEX DOCD: CPT | Mod: CPTII,S$GLB,, | Performed by: OTOLARYNGOLOGY

## 2023-10-23 PROCEDURE — 90471 IMMUNIZATION ADMIN: CPT | Mod: S$GLB,,, | Performed by: INTERNAL MEDICINE

## 2023-10-23 PROCEDURE — 90686 FLU VACCINE (QUAD) GREATER THAN OR EQUAL TO 3YO PRESERVATIVE FREE IM: ICD-10-PCS | Mod: S$GLB,,, | Performed by: INTERNAL MEDICINE

## 2023-10-23 RX ORDER — AZELASTINE 1 MG/ML
1 SPRAY, METERED NASAL 2 TIMES DAILY
Qty: 30 ML | Refills: 11 | Status: SHIPPED | OUTPATIENT
Start: 2023-10-23 | End: 2024-10-22

## 2023-10-23 NOTE — PROGRESS NOTES
History of Present Illness:   Silverio Devlin is a 58 y.o. year old male evaluated in the Otolaryngology-Head and Neck Surgery Clinic at [facility]. The patient is seen in transfer of care from   for evaluation of nasal airway.  Patient had septoplasty and turbinate reduction with  grafts about 1 year ago.  He had a broken nose about 40 years ago with right-sided obstruction after that.  He reports that after the surgery he initially had great open nasal airway.  He had continued on Astelin for allergy but had stopped recently.  He reports recurrent in the last couple month obstruction on the right that he feels is little higher up and like his breathing out of a filter.  He does have seasonal allergies but has never been tested.  He is not doing any nasal saline or other topical medications.              Past Medical/Surgical History  Past Medical History:   Diagnosis Date    Headache     Heartburn      His  has a past surgical history that includes Nasal stenosis repair (N/A, 11/7/2022) and Nasal septoplasty (N/A, 11/7/2022).     Past Family/Social History  His family history is not on file.  He  reports that he has been smoking. He has never been exposed to tobacco smoke. He has never used smokeless tobacco. He reports that he does not currently use alcohol. He reports current drug use. Drug: Marijuana.     Medications/Allergies/Immunizations  His current medication(s) include:   Current Outpatient Medications   Medication Sig Dispense Refill    aspirin 325 MG tablet Take 325 mg by mouth once daily.      azelastine (ASTELIN) 137 mcg (0.1 %) nasal spray 1 spray (137 mcg total) by Nasal route 2 (two) times daily. 30 mL 11    bacitracin 500 unit/gram ointment Apply topically 2 (two) times daily. (Patient not taking: Reported on 12/8/2022) 28 g 0    HYDROcodone-acetaminophen (NORCO) 5-325 mg per tablet Take 1 tablet by mouth every 6 (six) hours as needed for Pain. (Patient not taking: Reported  on 12/8/2022) 16 tablet 0    ibuprofen (ADVIL,MOTRIN) 100 MG tablet Take 100 mg by mouth every 6 (six) hours as needed for Temperature greater than.      omeprazole (PRILOSEC) 10 MG capsule Take 10 mg by mouth once daily.      ondansetron (ZOFRAN-ODT) 4 MG TbDL Take 1 tablet (4 mg total) by mouth every 8 (eight) hours as needed. (Patient not taking: Reported on 11/15/2022) 20 tablet 0     No current facility-administered medications for this visit.        Allergies: Patient has no known allergies.     Immunizations:   Immunization History   Administered Date(s) Administered    COVID-19, MRNA, LN-S, PF (MODERNA FULL 0.5 ML DOSE) 07/17/2021, 08/12/2021    Influenza - Quadrivalent - PF *Preferred* (6 months and older) 10/23/2023         Review of Systems   Constitutional: Negative for fever, weight loss and weight gain.  Skin: Negative for rash, itchiness, dryness  HENT:  As per HPI  Cardiovascular: Negative for chest pain and dyspnea on exertion .   Respiratory: Is not experiencing shortness of breath.   Gastrointestinal: Negative for nausea and vomiting.   Neurological: Negative for headaches.   Lymph/Heme: Negative for lymphadenopathy or easy bruising  Musculoskeletal: Negative for joint or muscle pain  Psychiatric: The patient is not nervous/anxious.        Answers submitted by the patient for this visit:  Review of Symptoms Questionnaire  (Submitted on 10/22/2023)  Fatigue (Tiredness)?: Yes  None of these : Yes  Snoring?: Yes  Sleep Apnea?: Yes  shortness of breath: Yes  None of these : Yes  abdominal pain: Yes  Acid Reflux?: Yes  None of these: Yes  back pain: Yes  neck pain: Yes  None of these : Yes  None of these: Yes  None of these : Yes  None of these: Yes  None of these: Yes  sleep disturbance: Yes  All other systems are negative except for that listed in the HPI.      PHYSICAL EXAM:   Vital Signs:  Wt 111.1 kg (245 lb)   BMI 36.18 kg/m²      General:  Well-developed, well-nourished  Communication and  Voice:  Clear pitch and clarity  Hearing: Hearing adequate for verbal communication bilaterally   Inspection:  Normocephalic and atraumatic without mass or lesion  Palpation:  Facial skeleton intact without bony stepoffs  Parotid Glands:  No mass or tenderness  Facial Strength:  Facial motility symmetric and full bilaterally  Pinna:  External ear intact and fully developed  External canal:  Canal is patent with intact skin  Tympanic Membrane:  Clear and mobile  External nose:  No scar or anatomic deformity he has 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  No edema, polyp, or rhinorrhea.  TMJ:  No pain to palpation with full mobility  Oral cavity, Lips, Teeth, and Gums:  Mucosa and teeth intact and viable, No lesions, masses or ulcers  Oropharynx: No erythema or exudate, no masses or ulcerations, non-obstructive tonsils  Nasopharynx:  No mass or lesion with intact mucosa  Hypopharynx:  Not well visualized secondary to gagging  Larynx:  Not well visualized secondary to gagging  Neck, Trachea, Lymphatics:  Midline trachea without mass or lesion, no lymphadenopathy  Thyroid:  No mass or nodularity  Eyes: No nystagmus with equal extraocular motion bilaterally  Neuro/Psych/Balance: Patient oriented and appropriate in interaction;  Appropriate mood and affect;  Gait is intact with no imbalance; Cranial nerves I-XII are intact  Respiratory effort:  Equal inspiration and expiration without stridor  Peripheral Vascular:  Warm extremities with equal pulses     Procedure: Rigid endoscopic nasal exam   Surgeon: Mitchel Bush MD  Procedure note/findings: After informed discussion of the risks, benefits, and alternatives after indications as noted above, nasal cavities were topically anesthetized and  decongested with 4% lidocaine and Afrin solutions. A rigid 0 degree nasal endoscope was inserted into bilateral nasal cavities and the following was noted    Right:   Both inferior and middle turbinates are normal without hypertrophy  Mild continued deviation of the septum dorsally and superior to middle turbinate level not in the nasal airway  The Osteomeatal complex including the middle and superior meatus does not show any polyps or lesions  The sphenoethmoid recess was clear    Left   Both inferior and middle turbinates are normal without hypertrophy  The Osteomeatal complex including the middle and superior meatus does not show any polyps or lesions  The sphenoethmoid recess was clear    Nasopharynx, similarly, revealed no mass lesion or granularity. There was no ulceration. There was no gross asymmetry. Fossa of [place]mu[place] was intact bilaterally. The eustachian tube orifices were intact bilaterally and patent.  The scope was then withdrawn and removed. He tolerated this well.            ASSESSMENT:   1. Nasal obstruction    2. History of nasal surgery    3. Non-seasonal allergic rhinitis due to pollen            PLAN:   I explained to the patient that on physical examination nasal endoscopy has no residual anatomic obstruction and there is no polyposis or other explanation for his continued nasal obstruction.  He has very mild nasal valve collapse.  I suspect a lot of his continued nasal obstruction secondary to allergic disease.  I recommended restarting Astelin.  If this does not help I recommend formal evaluation by Allergy.  He is a potential candidate for Latera in the future but will hold off for now    I believe that Mr. Devlin has a good understanding of the issues involved and I answered all of his questions.     DISCLAIMER: This note was prepared with [employer] voice recognition transcription software. Garbled syntax, mangled pronouns, and other bizarre constructions may be attributed to that software  system. While efforts were made to correct any mistakes made by this voice recognition program, some errors and/or omissions may remain in the note that were missed when the note was originally created.

## 2023-11-06 ENCOUNTER — OFFICE VISIT (OUTPATIENT)
Dept: INTERNAL MEDICINE | Facility: CLINIC | Age: 58
End: 2023-11-06
Payer: COMMERCIAL

## 2023-11-06 ENCOUNTER — LAB VISIT (OUTPATIENT)
Dept: LAB | Facility: HOSPITAL | Age: 58
End: 2023-11-06
Attending: FAMILY MEDICINE
Payer: COMMERCIAL

## 2023-11-06 VITALS
DIASTOLIC BLOOD PRESSURE: 98 MMHG | SYSTOLIC BLOOD PRESSURE: 138 MMHG | BODY MASS INDEX: 34.16 KG/M2 | HEART RATE: 93 BPM | HEIGHT: 69 IN | TEMPERATURE: 97 F | WEIGHT: 230.63 LBS | RESPIRATION RATE: 18 BRPM | OXYGEN SATURATION: 97 %

## 2023-11-06 DIAGNOSIS — I10 PRIMARY HYPERTENSION: ICD-10-CM

## 2023-11-06 DIAGNOSIS — Z00.00 PREVENTATIVE HEALTH CARE: ICD-10-CM

## 2023-11-06 DIAGNOSIS — L72.3 SEBACEOUS CYST: ICD-10-CM

## 2023-11-06 DIAGNOSIS — E66.01 SEVERE OBESITY (BMI 35.0-39.9) WITH COMORBIDITY: ICD-10-CM

## 2023-11-06 DIAGNOSIS — Z12.11 COLON CANCER SCREENING: ICD-10-CM

## 2023-11-06 DIAGNOSIS — Z00.00 PREVENTATIVE HEALTH CARE: Primary | ICD-10-CM

## 2023-11-06 DIAGNOSIS — K21.9 GASTROESOPHAGEAL REFLUX DISEASE, UNSPECIFIED WHETHER ESOPHAGITIS PRESENT: ICD-10-CM

## 2023-11-06 LAB
ALBUMIN SERPL BCP-MCNC: 4.1 G/DL (ref 3.5–5.2)
ALP SERPL-CCNC: 64 U/L (ref 55–135)
ALT SERPL W/O P-5'-P-CCNC: 18 U/L (ref 10–44)
ANION GAP SERPL CALC-SCNC: 11 MMOL/L (ref 8–16)
AST SERPL-CCNC: 23 U/L (ref 10–40)
BASOPHILS # BLD AUTO: 0.05 K/UL (ref 0–0.2)
BASOPHILS NFR BLD: 0.7 % (ref 0–1.9)
BILIRUB SERPL-MCNC: 0.7 MG/DL (ref 0.1–1)
BUN SERPL-MCNC: 12 MG/DL (ref 6–20)
CALCIUM SERPL-MCNC: 9.7 MG/DL (ref 8.7–10.5)
CHLORIDE SERPL-SCNC: 101 MMOL/L (ref 95–110)
CHOLEST SERPL-MCNC: 201 MG/DL (ref 120–199)
CHOLEST/HDLC SERPL: 4.2 {RATIO} (ref 2–5)
CO2 SERPL-SCNC: 25 MMOL/L (ref 23–29)
CREAT SERPL-MCNC: 0.9 MG/DL (ref 0.5–1.4)
DIFFERENTIAL METHOD: ABNORMAL
EOSINOPHIL # BLD AUTO: 0 K/UL (ref 0–0.5)
EOSINOPHIL NFR BLD: 0.3 % (ref 0–8)
ERYTHROCYTE [DISTWIDTH] IN BLOOD BY AUTOMATED COUNT: 13.4 % (ref 11.5–14.5)
EST. GFR  (NO RACE VARIABLE): >60 ML/MIN/1.73 M^2
ESTIMATED AVG GLUCOSE: 103 MG/DL (ref 68–131)
GLUCOSE SERPL-MCNC: 101 MG/DL (ref 70–110)
HBA1C MFR BLD: 5.2 % (ref 4–5.6)
HCT VFR BLD AUTO: 47.6 % (ref 40–54)
HDLC SERPL-MCNC: 48 MG/DL (ref 40–75)
HDLC SERPL: 23.9 % (ref 20–50)
HGB BLD-MCNC: 15.9 G/DL (ref 14–18)
IMM GRANULOCYTES # BLD AUTO: 0.04 K/UL (ref 0–0.04)
IMM GRANULOCYTES NFR BLD AUTO: 0.6 % (ref 0–0.5)
LDLC SERPL CALC-MCNC: 138 MG/DL (ref 63–159)
LYMPHOCYTES # BLD AUTO: 1 K/UL (ref 1–4.8)
LYMPHOCYTES NFR BLD: 14 % (ref 18–48)
MCH RBC QN AUTO: 33.2 PG (ref 27–31)
MCHC RBC AUTO-ENTMCNC: 33.4 G/DL (ref 32–36)
MCV RBC AUTO: 99 FL (ref 82–98)
MONOCYTES # BLD AUTO: 0.7 K/UL (ref 0.3–1)
MONOCYTES NFR BLD: 9.8 % (ref 4–15)
NEUTROPHILS # BLD AUTO: 5.3 K/UL (ref 1.8–7.7)
NEUTROPHILS NFR BLD: 74.6 % (ref 38–73)
NONHDLC SERPL-MCNC: 153 MG/DL
NRBC BLD-RTO: 0 /100 WBC
PLATELET # BLD AUTO: 224 K/UL (ref 150–450)
PMV BLD AUTO: 9.9 FL (ref 9.2–12.9)
POTASSIUM SERPL-SCNC: 4.9 MMOL/L (ref 3.5–5.1)
PROT SERPL-MCNC: 7.2 G/DL (ref 6–8.4)
RBC # BLD AUTO: 4.79 M/UL (ref 4.6–6.2)
SODIUM SERPL-SCNC: 137 MMOL/L (ref 136–145)
T4 FREE SERPL-MCNC: 0.91 NG/DL (ref 0.71–1.51)
TRIGL SERPL-MCNC: 75 MG/DL (ref 30–150)
TSH SERPL DL<=0.005 MIU/L-ACNC: 1.18 UIU/ML (ref 0.4–4)
WBC # BLD AUTO: 7.14 K/UL (ref 3.9–12.7)

## 2023-11-06 PROCEDURE — 99999 PR PBB SHADOW E&M-EST. PATIENT-LVL V: ICD-10-PCS | Mod: PBBFAC,,, | Performed by: FAMILY MEDICINE

## 2023-11-06 PROCEDURE — 1159F PR MEDICATION LIST DOCUMENTED IN MEDICAL RECORD: ICD-10-PCS | Mod: CPTII,S$GLB,, | Performed by: FAMILY MEDICINE

## 2023-11-06 PROCEDURE — 1160F RVW MEDS BY RX/DR IN RCRD: CPT | Mod: CPTII,S$GLB,, | Performed by: FAMILY MEDICINE

## 2023-11-06 PROCEDURE — 84439 ASSAY OF FREE THYROXINE: CPT | Performed by: FAMILY MEDICINE

## 2023-11-06 PROCEDURE — 80053 COMPREHEN METABOLIC PANEL: CPT | Performed by: FAMILY MEDICINE

## 2023-11-06 PROCEDURE — 3080F PR MOST RECENT DIASTOLIC BLOOD PRESSURE >= 90 MM HG: ICD-10-PCS | Mod: CPTII,S$GLB,, | Performed by: FAMILY MEDICINE

## 2023-11-06 PROCEDURE — 80061 LIPID PANEL: CPT | Performed by: FAMILY MEDICINE

## 2023-11-06 PROCEDURE — 83036 HEMOGLOBIN GLYCOSYLATED A1C: CPT | Performed by: FAMILY MEDICINE

## 2023-11-06 PROCEDURE — 3080F DIAST BP >= 90 MM HG: CPT | Mod: CPTII,S$GLB,, | Performed by: FAMILY MEDICINE

## 2023-11-06 PROCEDURE — 84443 ASSAY THYROID STIM HORMONE: CPT | Performed by: FAMILY MEDICINE

## 2023-11-06 PROCEDURE — 85025 COMPLETE CBC W/AUTO DIFF WBC: CPT | Performed by: FAMILY MEDICINE

## 2023-11-06 PROCEDURE — 99386 PR PREVENTIVE VISIT,NEW,40-64: ICD-10-PCS | Mod: S$GLB,,, | Performed by: FAMILY MEDICINE

## 2023-11-06 PROCEDURE — 3008F BODY MASS INDEX DOCD: CPT | Mod: CPTII,S$GLB,, | Performed by: FAMILY MEDICINE

## 2023-11-06 PROCEDURE — 99386 PREV VISIT NEW AGE 40-64: CPT | Mod: S$GLB,,, | Performed by: FAMILY MEDICINE

## 2023-11-06 PROCEDURE — 36415 COLL VENOUS BLD VENIPUNCTURE: CPT | Mod: PO | Performed by: FAMILY MEDICINE

## 2023-11-06 PROCEDURE — 99999 PR PBB SHADOW E&M-EST. PATIENT-LVL V: CPT | Mod: PBBFAC,,, | Performed by: FAMILY MEDICINE

## 2023-11-06 PROCEDURE — 1159F MED LIST DOCD IN RCRD: CPT | Mod: CPTII,S$GLB,, | Performed by: FAMILY MEDICINE

## 2023-11-06 PROCEDURE — 3075F SYST BP GE 130 - 139MM HG: CPT | Mod: CPTII,S$GLB,, | Performed by: FAMILY MEDICINE

## 2023-11-06 PROCEDURE — 3008F PR BODY MASS INDEX (BMI) DOCUMENTED: ICD-10-PCS | Mod: CPTII,S$GLB,, | Performed by: FAMILY MEDICINE

## 2023-11-06 PROCEDURE — 1160F PR REVIEW ALL MEDS BY PRESCRIBER/CLIN PHARMACIST DOCUMENTED: ICD-10-PCS | Mod: CPTII,S$GLB,, | Performed by: FAMILY MEDICINE

## 2023-11-06 PROCEDURE — 3075F PR MOST RECENT SYSTOLIC BLOOD PRESS GE 130-139MM HG: ICD-10-PCS | Mod: CPTII,S$GLB,, | Performed by: FAMILY MEDICINE

## 2023-11-06 RX ORDER — OMEPRAZOLE 20 MG/1
20 CAPSULE, DELAYED RELEASE ORAL DAILY
Qty: 30 CAPSULE | Refills: 11 | Status: SHIPPED | OUTPATIENT
Start: 2023-11-06 | End: 2024-11-05

## 2023-11-06 RX ORDER — LOSARTAN POTASSIUM 25 MG/1
25 TABLET ORAL DAILY
Qty: 30 TABLET | Refills: 11 | Status: SHIPPED | OUTPATIENT
Start: 2023-11-06 | End: 2024-11-05

## 2023-11-06 NOTE — PROGRESS NOTES
"Subjective     Patient ID: Silverio Devlin is a 58 y.o. male.    Chief Complaint: Establish Care, Annual Exam, and Low-back Pain (Right side "cyst")  58-year-old white male presents to clinic today to establish care.  He reports no known previous past medical history.  He has been evaluated by ENT and has underwent a nasal septoplasty and nasal stenosis repair.  He reports a family history of his mother having dementia.  He reports that his father has hypertension, GERD, and thyroid disease.  His brother has hypertension and Guillain-[place] syndrome.  He is up-to-date with vaccinations.  Colonoscopy has been ordered.  Finally, the patient reports that he does have GERD for which he uses Prilosec daily for treatment.  He reports that he has had symptoms for 10 years and reports controlled with use of daily Prilosec.  Secondarily, he reports a suspected cyst to his right lower back.  Low-back Pain  Pertinent negatives include no abdominal pain, chest pain, chills, congestion, coughing, fatigue, fever, headaches, myalgias, nausea, neck pain, rash, sore throat or vomiting.     Review of Systems   Constitutional:  Negative for appetite change, chills, fatigue and fever.   HENT:  Negative for nasal congestion, ear pain, hearing loss, postnasal drip, rhinorrhea, sinus pressure/congestion, sore throat and tinnitus.    Eyes:  Negative for redness, itching and visual disturbance.   Respiratory:  Negative for cough, chest tightness and shortness of breath.    Cardiovascular:  Negative for chest pain and palpitations.   Gastrointestinal:  Negative for abdominal pain, constipation, diarrhea, nausea and vomiting.   Genitourinary:  Negative for decreased urine volume, difficulty urinating, dysuria, frequency, hematuria and urgency.   Musculoskeletal:  Positive for back pain (right lower back). Negative for myalgias, neck pain and neck stiffness.   Integumentary:  Negative for rash.   Neurological:  Negative for dizziness, " light-headedness and headaches.   Psychiatric/Behavioral: Negative.            Objective     Physical Exam  Vitals and nursing note reviewed.   Constitutional:       General: He is not in acute distress.     Appearance: Normal appearance. He is well-developed. He is not diaphoretic.   HENT:      Head: Normocephalic and atraumatic.      Right Ear: External ear normal.      Left Ear: External ear normal.      Nose: Nose normal.      Mouth/Throat:      Pharynx: No oropharyngeal exudate.   Eyes:      General: No scleral icterus.        Right eye: No discharge.         Left eye: No discharge.      Conjunctiva/sclera: Conjunctivae normal.      Pupils: Pupils are equal, round, and reactive to light.   Neck:      Thyroid: No thyromegaly.      Vascular: No JVD.      Trachea: No tracheal deviation.   Cardiovascular:      Rate and Rhythm: Normal rate and regular rhythm.      Heart sounds: Normal heart sounds. No murmur heard.     No friction rub. No gallop.   Pulmonary:      Effort: Pulmonary effort is normal. No respiratory distress.      Breath sounds: Normal breath sounds. No stridor. No wheezing, rhonchi or rales.   Chest:      Chest wall: No tenderness.   Abdominal:      General: Bowel sounds are normal. There is no distension.      Palpations: Abdomen is soft. There is no mass.      Tenderness: There is no abdominal tenderness. There is no guarding or rebound.   Musculoskeletal:         General: No tenderness. Normal range of motion.      Cervical back: Normal range of motion and neck supple.        Back:    Lymphadenopathy:      Cervical: No cervical adenopathy.   Skin:     General: Skin is warm and dry.      Coloration: Skin is not pale.      Findings: No erythema or rash.   Neurological:      Mental Status: He is alert and oriented to person, place, and time.   Psychiatric:         Mood and Affect: Mood normal.         Behavior: Behavior normal.         Thought Content: Thought content normal.         Judgment:  Judgment normal.            Assessment and Plan     1. Preventative health care  -     CBC Auto Differential; Future; Expected date: 11/06/2023  -     Comprehensive Metabolic Panel; Future; Expected date: 11/06/2023  -     Lipid Panel; Future; Expected date: 11/06/2023  -     T4, Free; Future; Expected date: 11/06/2023  -     TSH; Future; Expected date: 11/06/2023  -     Urinalysis; Future; Expected date: 11/06/2023  -     Hemoglobin A1C; Future; Expected date: 11/06/2023    2. Primary hypertension  -     losartan (COZAAR) 25 MG tablet; Take 1 tablet (25 mg total) by mouth once daily.  Dispense: 30 tablet; Refill: 11    3. Severe obesity (BMI 35.0-39.9) with comorbidity    4. Gastroesophageal reflux disease, unspecified whether esophagitis present  -     Ambulatory referral/consult to Endo Procedure ; Future; Expected date: 11/07/2023  -     omeprazole (PRILOSEC) 20 MG capsule; Take 1 capsule (20 mg total) by mouth once daily.  Dispense: 30 capsule; Refill: 11    5. Colon cancer screening  -     Ambulatory referral/consult to Endo Procedure ; Future; Expected date: 11/07/2023    6. Sebaceous cyst        1. CBC, CMP, UA, TSH, free T4, and fasting lipids.  2. Recommend starting losartan 25 mg daily for treatment of hypertension.    3. Encourage diet and exercise.    4. Continue use of Prilosec daily.  Recommend EGD and colonoscopy for further evaluation of GERD and for colon cancer screening.    5. Reassurance provided concerning sebaceous cyst.    6. Return to clinic as needed or in 1 month for hypertension re-evaluation.               Follow up in about 1 month (around 12/6/2023) for HTN re-evaluation.

## 2023-11-07 ENCOUNTER — CLINICAL SUPPORT (OUTPATIENT)
Dept: ENDOSCOPY | Facility: HOSPITAL | Age: 58
End: 2023-11-07
Attending: FAMILY MEDICINE
Payer: COMMERCIAL

## 2023-11-07 ENCOUNTER — TELEPHONE (OUTPATIENT)
Dept: ENDOSCOPY | Facility: HOSPITAL | Age: 58
End: 2023-11-07

## 2023-11-07 DIAGNOSIS — Z12.11 COLON CANCER SCREENING: ICD-10-CM

## 2023-11-07 DIAGNOSIS — K21.9 GASTROESOPHAGEAL REFLUX DISEASE, UNSPECIFIED WHETHER ESOPHAGITIS PRESENT: ICD-10-CM

## 2023-11-07 RX ORDER — SODIUM, POTASSIUM,MAG SULFATES 17.5-3.13G
1 SOLUTION, RECONSTITUTED, ORAL ORAL DAILY
Qty: 1 EACH | Refills: 0 | Status: SHIPPED | OUTPATIENT
Start: 2023-11-07 | End: 2023-11-09

## 2023-11-07 NOTE — TELEPHONE ENCOUNTER
Spoke to pt to schedule procedure(s) Colonoscopy/EGD       Physician to perform procedure(s) Dr. JF Jay  Date of Procedure (s) 2/14/24  Arrival Time 12:45 PM  Time of Procedure(s) 1:45 PM   Location of Procedure(s) [place] [address]nd Floor  Type of Rx Prep sent to patient: Suprep  Instructions provided to patient via [facility]    Patient was informed on the following information and verbalized understanding. Screening questionnaire reviewed with patient and complete. If procedure requires anesthesia, a responsible adult needs to be present to accompany the patient home, patient cannot drive after receiving anesthesia. Appointment details are tentative, especially check-in time. Patient will receive a prep-op call 4 days prior to confirm check-in time for procedure. If applicable the patient should contact their pharmacy to verify Rx for procedure prep is ready for pick-up. Patient was advised to call the scheduling department at [phone] if pharmacy states no Rx is available. Patient was advised to call the endoscopy scheduling department if any questions or concerns arise.      [facility] Scheduling Department

## 2023-11-22 ENCOUNTER — PATIENT OUTREACH (OUTPATIENT)
Dept: ADMINISTRATIVE | Facility: HOSPITAL | Age: 58
End: 2023-11-22
Payer: COMMERCIAL

## 2023-11-22 NOTE — PROGRESS NOTES

## 2023-12-04 ENCOUNTER — OFFICE VISIT (OUTPATIENT)
Dept: INTERNAL MEDICINE | Facility: CLINIC | Age: 58
End: 2023-12-04
Payer: COMMERCIAL

## 2023-12-04 VITALS
HEIGHT: 69 IN | TEMPERATURE: 97 F | OXYGEN SATURATION: 97 % | WEIGHT: 245.38 LBS | BODY MASS INDEX: 36.34 KG/M2 | RESPIRATION RATE: 18 BRPM | DIASTOLIC BLOOD PRESSURE: 86 MMHG | SYSTOLIC BLOOD PRESSURE: 122 MMHG | HEART RATE: 79 BPM

## 2023-12-04 DIAGNOSIS — I10 PRIMARY HYPERTENSION: Primary | ICD-10-CM

## 2023-12-04 PROCEDURE — 3044F PR MOST RECENT HEMOGLOBIN A1C LEVEL <7.0%: ICD-10-PCS | Mod: CPTII,S$GLB,, | Performed by: FAMILY MEDICINE

## 2023-12-04 PROCEDURE — 99213 OFFICE O/P EST LOW 20 MIN: CPT | Mod: S$GLB,,, | Performed by: FAMILY MEDICINE

## 2023-12-04 PROCEDURE — 1159F PR MEDICATION LIST DOCUMENTED IN MEDICAL RECORD: ICD-10-PCS | Mod: CPTII,S$GLB,, | Performed by: FAMILY MEDICINE

## 2023-12-04 PROCEDURE — 3008F PR BODY MASS INDEX (BMI) DOCUMENTED: ICD-10-PCS | Mod: CPTII,S$GLB,, | Performed by: FAMILY MEDICINE

## 2023-12-04 PROCEDURE — 1159F MED LIST DOCD IN RCRD: CPT | Mod: CPTII,S$GLB,, | Performed by: FAMILY MEDICINE

## 2023-12-04 PROCEDURE — 99213 PR OFFICE/OUTPT VISIT, EST, LEVL III, 20-29 MIN: ICD-10-PCS | Mod: S$GLB,,, | Performed by: FAMILY MEDICINE

## 2023-12-04 PROCEDURE — 3044F HG A1C LEVEL LT 7.0%: CPT | Mod: CPTII,S$GLB,, | Performed by: FAMILY MEDICINE

## 2023-12-04 PROCEDURE — 4010F PR ACE/ARB THEARPY RXD/TAKEN: ICD-10-PCS | Mod: CPTII,S$GLB,, | Performed by: FAMILY MEDICINE

## 2023-12-04 PROCEDURE — 3079F PR MOST RECENT DIASTOLIC BLOOD PRESSURE 80-89 MM HG: ICD-10-PCS | Mod: CPTII,S$GLB,, | Performed by: FAMILY MEDICINE

## 2023-12-04 PROCEDURE — 4010F ACE/ARB THERAPY RXD/TAKEN: CPT | Mod: CPTII,S$GLB,, | Performed by: FAMILY MEDICINE

## 2023-12-04 PROCEDURE — 3074F PR MOST RECENT SYSTOLIC BLOOD PRESSURE < 130 MM HG: ICD-10-PCS | Mod: CPTII,S$GLB,, | Performed by: FAMILY MEDICINE

## 2023-12-04 PROCEDURE — 99999 PR PBB SHADOW E&M-EST. PATIENT-LVL IV: ICD-10-PCS | Mod: PBBFAC,,, | Performed by: FAMILY MEDICINE

## 2023-12-04 PROCEDURE — 3074F SYST BP LT 130 MM HG: CPT | Mod: CPTII,S$GLB,, | Performed by: FAMILY MEDICINE

## 2023-12-04 PROCEDURE — 1160F RVW MEDS BY RX/DR IN RCRD: CPT | Mod: CPTII,S$GLB,, | Performed by: FAMILY MEDICINE

## 2023-12-04 PROCEDURE — 3079F DIAST BP 80-89 MM HG: CPT | Mod: CPTII,S$GLB,, | Performed by: FAMILY MEDICINE

## 2023-12-04 PROCEDURE — 99999 PR PBB SHADOW E&M-EST. PATIENT-LVL IV: CPT | Mod: PBBFAC,,, | Performed by: FAMILY MEDICINE

## 2023-12-04 PROCEDURE — 1160F PR REVIEW ALL MEDS BY PRESCRIBER/CLIN PHARMACIST DOCUMENTED: ICD-10-PCS | Mod: CPTII,S$GLB,, | Performed by: FAMILY MEDICINE

## 2023-12-04 PROCEDURE — 3008F BODY MASS INDEX DOCD: CPT | Mod: CPTII,S$GLB,, | Performed by: FAMILY MEDICINE

## 2023-12-04 NOTE — PROGRESS NOTES
Subjective     Patient ID: Silverio Devlin is a 58 y.o. male.    Chief Complaint: Follow-up  58-year-old white male returns to clinic today for hypertension re-evaluation.  He was last seen 1 month ago at which time blood pressure was elevated at 138/98.  He was started on losartan 25 mg daily returns today without complaints.  Follow-up  Pertinent negatives include no abdominal pain, chest pain, chills, congestion, coughing, fatigue, fever, headaches, myalgias, nausea, neck pain, rash, sore throat or vomiting.      Review of Systems   Constitutional:  Negative for appetite change, chills, fatigue and fever.   HENT:  Negative for nasal congestion, ear pain, hearing loss, postnasal drip, rhinorrhea, sinus pressure/congestion, sore throat and tinnitus.    Eyes:  Negative for redness, itching and visual disturbance.   Respiratory:  Negative for cough, chest tightness and shortness of breath.    Cardiovascular:  Negative for chest pain and palpitations.   Gastrointestinal:  Negative for abdominal pain, constipation, diarrhea, nausea and vomiting.   Genitourinary:  Negative for decreased urine volume, difficulty urinating, dysuria, frequency, hematuria and urgency.   Musculoskeletal:  Negative for back pain, myalgias, neck pain and neck stiffness.   Integumentary:  Negative for rash.   Neurological:  Negative for dizziness, light-headedness and headaches.   Psychiatric/Behavioral: Negative.            Objective     Physical Exam  Vitals and nursing note reviewed.   Constitutional:       General: He is not in acute distress.     Appearance: Normal appearance. He is well-developed. He is not diaphoretic.   HENT:      Head: Normocephalic and atraumatic.      Right Ear: External ear normal.      Left Ear: External ear normal.      Nose: Nose normal.      Mouth/Throat:      Pharynx: No oropharyngeal exudate.   Eyes:      General: No scleral icterus.        Right eye: No discharge.         Left eye: No discharge.       Conjunctiva/sclera: Conjunctivae normal.      Pupils: Pupils are equal, round, and reactive to light.   Neck:      Thyroid: No thyromegaly.      Vascular: No JVD.      Trachea: No tracheal deviation.   Cardiovascular:      Rate and Rhythm: Normal rate and regular rhythm.      Heart sounds: Normal heart sounds. No murmur heard.     No friction rub. No gallop.   Pulmonary:      Effort: Pulmonary effort is normal. No respiratory distress.      Breath sounds: Normal breath sounds. No stridor. No wheezing, rhonchi or rales.   Chest:      Chest wall: No tenderness.   Abdominal:      General: Bowel sounds are normal. There is no distension.      Palpations: Abdomen is soft. There is no mass.      Tenderness: There is no abdominal tenderness. There is no guarding or rebound.   Musculoskeletal:         General: No tenderness. Normal range of motion.      Cervical back: Normal range of motion and neck supple.   Lymphadenopathy:      Cervical: No cervical adenopathy.   Skin:     General: Skin is warm and dry.      Coloration: Skin is not pale.      Findings: No erythema or rash.   Neurological:      Mental Status: He is alert and oriented to person, place, and time.   Psychiatric:         Mood and Affect: Mood normal.         Behavior: Behavior normal.         Thought Content: Thought content normal.         Judgment: Judgment normal.            Assessment and Plan     1. Primary hypertension        1. Continue losartan 25 mg daily.  Hypertension is well controlled.  2. Return to clinic as needed or in 11 months for annual physical exam.               Follow up in about 11 months (around 11/4/2024), or if symptoms worsen or fail to improve, for Annual exam.

## 2024-02-09 ENCOUNTER — ANESTHESIA EVENT (OUTPATIENT)
Dept: ENDOSCOPY | Facility: HOSPITAL | Age: 59
End: 2024-02-09
Payer: COMMERCIAL

## 2024-02-09 NOTE — ANESTHESIA PREPROCEDURE EVALUATION
02/09/2024  Silverio Devlin is a 58 y.o., male.  [facility]  Anesthesia Pre-Operative Evaluation       Patient Name: Silverio Devlin  YOB: 1965  MRN: [id number]  [facility]: [id number]      Code Status: No Order   Date of Procedure: 2/14/2024  Anesthesia: Choice Procedure: Procedure(s) (LRB):  EGD (ESOPHAGOGASTRODUODENOSCOPY) (N/A)  COLONOSCOPY (N/A)  Pre-Operative Diagnosis: Gastroesophageal reflux disease, unspecified whether esophagitis present [K21.9]  Colon cancer screening [Z12.11]  Proceduralist: Surgeon(s) and Role:     * Kvng Jay MD - Primary Nurse: (Unknown)      SUBJECTIVE:   Silverio Devlin is a 58 y.o. male who  has a past medical history of Headache and Heartburn..     he has a current medication list which includes the following long-term medication(s): aspirin, azelastine, losartan, and omeprazole.     ALLERGIES:   Review of patient's allergies indicates:  No Known Allergies  LDA:          Lines/Drains/Airways       None                  Anesthesia Evaluation      Airway   Dental      Pulmonary  Shortness of breath: Snoring.  Cardiovascular     Neuro/Psych    (+) headaches    GI/Hepatic/Renal      Endo/Other    Abdominal                     MEDICATIONS:     Antibiotics (From admission, onward)      None          VTE Risk Mitigation (From admission, onward)      None              No current facility-administered medications for this encounter.     Current Outpatient Medications   Medication Sig Dispense Refill    aspirin 325 MG tablet Take 325 mg by mouth once daily.      azelastine (ASTELIN) 137 mcg (0.1 %) nasal spray 1 spray (137 mcg total) by Nasal route 2 (two) times daily. 30 mL 11    ibuprofen (ADVIL,MOTRIN) 100 MG tablet Take 100 mg by mouth every 6 (six) hours as needed for Temperature greater than.      losartan (COZAAR) 25 MG tablet Take 1 tablet (25  "mg total) by mouth once daily. 30 tablet 11    omeprazole (PRILOSEC) 20 MG capsule Take 1 capsule (20 mg total) by mouth once daily. 30 capsule 11          History:   There are no hospital problems to display for this patient.    Surgical History:    has a past surgical history that includes Nasal stenosis repair (N/A, 11/7/2022) and Nasal septoplasty (N/A, 11/7/2022).   Social History:    has no history on file for sexual activity.  reports that he has been smoking. He has never been exposed to tobacco smoke. He has never used smokeless tobacco. He reports that he does not currently use alcohol. He reports current drug use. Drug: Marijuana.     OBJECTIVE:     Vital Signs (Most Recent):    Vital Signs Range (Last 24H):          There is no height or weight on file to calculate BMI.   Wt Readings from Last 4 Encounters:   12/04/23 111.3 kg (245 lb 6 oz)   11/06/23 104.6 kg (230 lb 9.6 oz)   10/23/23 111.1 kg (245 lb)   09/12/23 112.8 kg (248 lb 10.9 oz)       Significant Labs:  Lab Results   Component Value Date    WBC 7.14 11/06/2023    HGB 15.9 11/06/2023    HCT 47.6 11/06/2023     11/06/2023     11/06/2023    K 4.9 11/06/2023     11/06/2023    CREATININE 0.9 11/06/2023    BUN 12 11/06/2023    CO2 25 11/06/2023     11/06/2023    CALCIUM 9.7 11/06/2023    MG 2.0 11/09/2010    PHOS 4.0 11/09/2010    ALKPHOS 64 11/06/2023    ALT 18 11/06/2023    AST 23 11/06/2023    ALBUMIN 4.1 11/06/2023    INR 1.2 11/08/2010    APTT 24.3 11/08/2010    HGBA1C 5.2 11/06/2023     (H) 11/09/2010    CPKMB 4.8 11/08/2010    TROPONINI 0.018 11/08/2010    MB 1.3 11/08/2010    BNP <10 11/08/2010     No LMP for male patient.  No results found for this or any previous visit (from the past 72 hour(s)).    EKG:       TTE:  No results found for this or any previous visit.  No results found for: "EF"   No results found for this or any previous visit.  PATTI:  No results found for this or any previous visit.  Stress " "Test:  No results found for this or any previous visit.     LHC:  No results found for this or any previous visit.     PFT:  No results found for: "FEV1", "FVC", "KQG6EFX", "TLC", "DLCO"     ASSESSMENT/PLAN:        Pre-op Assessment    I have reviewed the Patient Summary Reports.     I have reviewed the Nursing Notes. I have reviewed the NPO Status.   I have reviewed the Medications.     Review of Systems  Anesthesia Hx:  No problems with previous Anesthesia             Denies Family Hx of Anesthesia complications.    Denies Personal Hx of Anesthesia complications.                    Hematology/Oncology:  Hematology Normal   Oncology Normal                                   EENT/Dental:  EENT/Dental Normal  Nasal Valve Collapse          Cardiovascular:  Cardiovascular Normal                                            Pulmonary:  Pulmonary Normal     Shortness of breath: Snoring.                  Renal/:  Renal/ Normal                 Hepatic/GI:  Hepatic/GI Normal                 Musculoskeletal:  Musculoskeletal Normal                Neurological:      Headaches                                 Endocrine:  Endocrine Normal          Obesity / BMI > 30  Dermatological:  Skin Normal    Psych:  Psychiatric Normal                       Anesthesia Plan  Type of Anesthesia, risks & benefits discussed:    Anesthesia Type: Gen Natural Airway  Intra-op Monitoring Plan: Standard ASA Monitors  Post Op Pain Control Plan: multimodal analgesia  Induction:  IV  Informed Consent: Informed consent signed with the Patient and all parties understand the risks and agree with anesthesia plan.  All questions answered. Patient consented to blood products? No  ASA Score: 2  Day of Surgery Review of History & Physical: H&P Update referred to the surgeon/provider.    Ready For Surgery From Anesthesia Perspective.     .      "

## 2024-02-14 ENCOUNTER — ANESTHESIA (OUTPATIENT)
Dept: ENDOSCOPY | Facility: HOSPITAL | Age: 59
End: 2024-02-14
Payer: COMMERCIAL

## 2024-02-14 ENCOUNTER — HOSPITAL ENCOUNTER (OUTPATIENT)
Facility: HOSPITAL | Age: 59
Discharge: HOME | End: 2024-02-14
Attending: INTERNAL MEDICINE | Admitting: INTERNAL MEDICINE
Payer: COMMERCIAL

## 2024-02-14 VITALS
WEIGHT: 240 LBS | HEIGHT: 69 IN | OXYGEN SATURATION: 96 % | BODY MASS INDEX: 35.55 KG/M2 | RESPIRATION RATE: 20 BRPM | TEMPERATURE: 98 F | DIASTOLIC BLOOD PRESSURE: 89 MMHG | HEART RATE: 69 BPM | SYSTOLIC BLOOD PRESSURE: 145 MMHG

## 2024-02-14 DIAGNOSIS — K21.9 GERD (GASTROESOPHAGEAL REFLUX DISEASE): ICD-10-CM

## 2024-02-14 DIAGNOSIS — K21.9 GASTROESOPHAGEAL REFLUX DISEASE, UNSPECIFIED WHETHER ESOPHAGITIS PRESENT: Primary | ICD-10-CM

## 2024-02-14 DIAGNOSIS — Z12.11 COLON CANCER SCREENING: ICD-10-CM

## 2024-02-14 PROCEDURE — 45385 COLONOSCOPY W/LESION REMOVAL: CPT | Mod: 33,,, | Performed by: INTERNAL MEDICINE

## 2024-02-14 PROCEDURE — 88305 TISSUE EXAM BY PATHOLOGIST: CPT | Mod: 59 | Performed by: PATHOLOGY

## 2024-02-14 PROCEDURE — 63600175 PHARM REV CODE 636 W HCPCS: Performed by: NURSE ANESTHETIST, CERTIFIED REGISTERED

## 2024-02-14 PROCEDURE — 99900035 HC TECH TIME PER 15 MIN (STAT)

## 2024-02-14 PROCEDURE — D9220A PRA ANESTHESIA: Mod: 33,,, | Performed by: NURSE ANESTHETIST, CERTIFIED REGISTERED

## 2024-02-14 PROCEDURE — 94761 N-INVAS EAR/PLS OXIMETRY MLT: CPT

## 2024-02-14 PROCEDURE — 25000003 PHARM REV CODE 250: Performed by: NURSE ANESTHETIST, CERTIFIED REGISTERED

## 2024-02-14 PROCEDURE — 88305 TISSUE EXAM BY PATHOLOGIST: CPT | Mod: 26,,, | Performed by: PATHOLOGY

## 2024-02-14 PROCEDURE — 43239 EGD BIOPSY SINGLE/MULTIPLE: CPT | Mod: 51,,, | Performed by: INTERNAL MEDICINE

## 2024-02-14 PROCEDURE — 43239 EGD BIOPSY SINGLE/MULTIPLE: CPT | Performed by: INTERNAL MEDICINE

## 2024-02-14 PROCEDURE — 27200997: Performed by: INTERNAL MEDICINE

## 2024-02-14 PROCEDURE — 27201089 HC SNARE, DISP (ANY): Performed by: INTERNAL MEDICINE

## 2024-02-14 PROCEDURE — 37000008 HC ANESTHESIA 1ST 15 MINUTES: Performed by: INTERNAL MEDICINE

## 2024-02-14 PROCEDURE — 45385 COLONOSCOPY W/LESION REMOVAL: CPT | Mod: PT | Performed by: INTERNAL MEDICINE

## 2024-02-14 PROCEDURE — 37000009 HC ANESTHESIA EA ADD 15 MINS: Performed by: INTERNAL MEDICINE

## 2024-02-14 RX ORDER — SODIUM CHLORIDE 9 MG/ML
INJECTION, SOLUTION INTRAVENOUS CONTINUOUS
Status: DISCONTINUED | OUTPATIENT
Start: 2024-02-14 | End: 2024-02-14 | Stop reason: HOSPADM

## 2024-02-14 RX ORDER — PROPOFOL 10 MG/ML
VIAL (ML) INTRAVENOUS
Status: DISCONTINUED | OUTPATIENT
Start: 2024-02-14 | End: 2024-02-14

## 2024-02-14 RX ORDER — KETAMINE HCL IN 0.9 % NACL 50 MG/5 ML
SYRINGE (ML) INTRAVENOUS
Status: DISCONTINUED | OUTPATIENT
Start: 2024-02-14 | End: 2024-02-14

## 2024-02-14 RX ORDER — PROPOFOL 10 MG/ML
VIAL (ML) INTRAVENOUS CONTINUOUS PRN
Status: DISCONTINUED | OUTPATIENT
Start: 2024-02-14 | End: 2024-02-14

## 2024-02-14 RX ORDER — FENTANYL CITRATE 50 UG/ML
INJECTION, SOLUTION INTRAMUSCULAR; INTRAVENOUS
Status: DISCONTINUED | OUTPATIENT
Start: 2024-02-14 | End: 2024-02-14

## 2024-02-14 RX ORDER — LIDOCAINE HYDROCHLORIDE 20 MG/ML
INJECTION INTRAVENOUS
Status: DISCONTINUED | OUTPATIENT
Start: 2024-02-14 | End: 2024-02-14

## 2024-02-14 RX ADMIN — SODIUM CHLORIDE: 0.9 INJECTION, SOLUTION INTRAVENOUS at 03:02

## 2024-02-14 RX ADMIN — SODIUM CHLORIDE: 0.9 INJECTION, SOLUTION INTRAVENOUS at 02:02

## 2024-02-14 RX ADMIN — PROPOFOL 200 MCG/KG/MIN: 10 INJECTION, EMULSION INTRAVENOUS at 02:02

## 2024-02-14 RX ADMIN — PROPOFOL 100 MG: 10 INJECTION, EMULSION INTRAVENOUS at 02:02

## 2024-02-14 RX ADMIN — FENTANYL CITRATE 50 MCG: 50 INJECTION, SOLUTION INTRAMUSCULAR; INTRAVENOUS at 02:02

## 2024-02-14 RX ADMIN — Medication 25 MG: at 02:02

## 2024-02-14 RX ADMIN — LIDOCAINE HYDROCHLORIDE 100 MG: 20 INJECTION INTRAVENOUS at 02:02

## 2024-02-14 NOTE — TRANSFER OF CARE
"Anesthesia Transfer of Care Note    Patient: Silverio Devlin    Procedure(s) Performed: Procedure(s) (LRB):  EGD (ESOPHAGOGASTRODUODENOSCOPY) (N/A)  COLONOSCOPY (N/A)    Patient location: PACU    Anesthesia Type: general    Transport from OR: Transported from OR on room air with adequate spontaneous ventilation    Post pain: adequate analgesia    Post assessment: no apparent anesthetic complications and tolerated procedure well    Post vital signs: stable    Level of consciousness: alert, awake and oriented    Nausea/Vomiting: no nausea/vomiting    Complications: none    Transfer of care protocol was followed      Last vitals: Visit Vitals  BP (!) 146/72 (BP Location: Left arm, Patient Position: Lying)   Pulse 74   Temp 36.3 °C (97.3 °F) (Tympanic)   Resp 16   Ht 5' 9" (1.753 m)   Wt 108.9 kg (240 lb)   SpO2 97%   BMI 35.44 kg/m²     "

## 2024-02-14 NOTE — PROVATION PATIENT INSTRUCTIONS
Discharge Summary/Instructions after an Endoscopic Procedure  Patient Name: Silverio Devlin  Patient MRN: [id number]  Patient YOB: 1965 Wednesday, February 14, 2024  Kvng Jay MD  Dear patient,  As a result of recent federal legislation (The Federal Cures Act), you may   receive lab or pathology results from your procedure in your [facility]   account before your physician is able to contact you. Your physician or   their representative will relay the results to you with their   recommendations at their soonest availability.  Thank you,  RESTRICTIONS:  During your procedure today, you received medications for sedation.  These   medications may affect your judgment, balance and coordination.  Therefore,   for 24 hours, you have the following restrictions:   - DO NOT drive a car, operate machinery, make legal/financial decisions,   sign important papers or drink alcohol.    ACTIVITY:  Today: no heavy lifting, straining or running due to procedural   sedation/anesthesia.  The following day: return to full activity including work.  DIET:  Eat and drink normally unless instructed otherwise.     TREATMENT FOR COMMON SIDE EFFECTS:  - Mild abdominal pain, nausea, belching, bloating or excessive gas:  rest,   eat lightly and use a heating pad.  - Sore Throat: treat with throat lozenges and/or gargle with warm salt   water.  - Because air was used during the procedure, expelling large amounts of air   from your rectum or belching is normal.  - If a bowel prep was taken, you may not have a bowel movement for 1-3 days.    This is normal.  SYMPTOMS TO WATCH FOR AND REPORT TO YOUR PHYSICIAN:  1. Abdominal pain or bloating, other than gas cramps.  2. Chest pain.  3. Back pain.  4. Signs of infection such as: chills or fever occurring within 24 hours   after the procedure.  5. Rectal bleeding, which would show as bright red, maroon, or black stools.   (A tablespoon of blood from the rectum is not serious, especially  if   hemorrhoids are present.)  6. Vomiting.  7. Weakness or dizziness.  GO DIRECTLY TO THE NEAREST EMERGENCY ROOM IF YOU HAVE ANY OF THE FOLLOWING:      Difficulty breathing              Chills and/or fever over 101 F   Persistent vomiting and/or vomiting blood   Severe abdominal pain   Severe chest pain   Black, tarry stools   Bleeding- more than one tablespoon   Any other symptom or condition that you feel may need urgent attention  Your doctor recommends these additional instructions:  If any biopsies were taken, your doctors clinic will contact you in 1 to 2   weeks with any results.  - Patient has a contact number available for emergencies.  The signs and   symptoms of potential delayed complications were discussed with the   patient.  Return to normal activities tomorrow.  Written discharge   instructions were provided to the patient.   - Discharge patient to home.   - Resume previous diet.   - Continue present medications.   - Await pathology results.   For questions, problems or results please call your physician - Kvng Jay MD at Work:  [phone].  [facility], EMERGENCY ROOM PHONE NUMBER: [phone]  IF A COMPLICATION OR EMERGENCY SITUATION ARISES AND YOU ARE UNABLE TO REACH   YOUR PHYSICIAN - GO DIRECTLY TO THE EMERGENCY ROOM.  Kvng Jay MD  2/14/2024 2:32:44 PM  This report has been verified and signed electronically.  Dear patient,  As a result of recent federal legislation (The Federal Cures Act), you may   receive lab or pathology results from your procedure in your [facility]   account before your physician is able to contact you. Your physician or   their representative will relay the results to you with their   recommendations at their soonest availability.  Thank you,  PROVATION

## 2024-02-14 NOTE — PROVATION PATIENT INSTRUCTIONS
Discharge Summary/Instructions after an Endoscopic Procedure  Patient Name: Silverio Devlin  Patient MRN: [id number]  Patient YOB: 1965 Wednesday, February 14, 2024  Kvng Jay MD  Dear patient,  As a result of recent federal legislation (The Federal Cures Act), you may   receive lab or pathology results from your procedure in your [facility]   account before your physician is able to contact you. Your physician or   their representative will relay the results to you with their   recommendations at their soonest availability.  Thank you,  RESTRICTIONS:  During your procedure today, you received medications for sedation.  These   medications may affect your judgment, balance and coordination.  Therefore,   for 24 hours, you have the following restrictions:   - DO NOT drive a car, operate machinery, make legal/financial decisions,   sign important papers or drink alcohol.    ACTIVITY:  Today: no heavy lifting, straining or running due to procedural   sedation/anesthesia.  The following day: return to full activity including work.  DIET:  Eat and drink normally unless instructed otherwise.     TREATMENT FOR COMMON SIDE EFFECTS:  - Mild abdominal pain, nausea, belching, bloating or excessive gas:  rest,   eat lightly and use a heating pad.  - Sore Throat: treat with throat lozenges and/or gargle with warm salt   water.  - Because air was used during the procedure, expelling large amounts of air   from your rectum or belching is normal.  - If a bowel prep was taken, you may not have a bowel movement for 1-3 days.    This is normal.  SYMPTOMS TO WATCH FOR AND REPORT TO YOUR PHYSICIAN:  1. Abdominal pain or bloating, other than gas cramps.  2. Chest pain.  3. Back pain.  4. Signs of infection such as: chills or fever occurring within 24 hours   after the procedure.  5. Rectal bleeding, which would show as bright red, maroon, or black stools.   (A tablespoon of blood from the rectum is not serious, especially  if   hemorrhoids are present.)  6. Vomiting.  7. Weakness or dizziness.  GO DIRECTLY TO THE NEAREST EMERGENCY ROOM IF YOU HAVE ANY OF THE FOLLOWING:      Difficulty breathing              Chills and/or fever over 101 F   Persistent vomiting and/or vomiting blood   Severe abdominal pain   Severe chest pain   Black, tarry stools   Bleeding- more than one tablespoon   Any other symptom or condition that you feel may need urgent attention  Your doctor recommends these additional instructions:  If any biopsies were taken, your doctors clinic will contact you in 1 to 2   weeks with any results.  - Patient has a contact number available for emergencies.  The signs and   symptoms of potential delayed complications were discussed with the   patient.  Return to normal activities tomorrow.  Written discharge   instructions were provided to the patient.   - Discharge patient to home.   - Resume previous diet.   - Continue present medications.   - Await pathology results.   - Repeat colonoscopy in 1 year for surveillance.   For questions, problems or results please call your physician - Kvng Jay MD at Work:  [phone].  [facility], EMERGENCY ROOM PHONE NUMBER: [phone]  IF A COMPLICATION OR EMERGENCY SITUATION ARISES AND YOU ARE UNABLE TO REACH   YOUR PHYSICIAN - GO DIRECTLY TO THE EMERGENCY ROOM.  Kvng Jay MD  2/14/2024 3:13:36 PM  This report has been verified and signed electronically.  Dear patient,  As a result of recent federal legislation (The Federal Cures Act), you may   receive lab or pathology results from your procedure in your [facility]   account before your physician is able to contact you. Your physician or   their representative will relay the results to you with their   recommendations at their soonest availability.  Thank you,  PROVATION

## 2024-02-14 NOTE — H&P
Short Stay Endoscopy History and Physical    PCP - Lloyd Armando MD    Procedure - EGD/Colonoscopy  Sedation: GA  ASA - per anesthesia  Mallampati - per anesthesia  History of Anesthesia problems - no  Family history Anesthesia problems -  no     HPI:  This is a 58 y.o. male here for evaluation of : GERD and Screening for CRC    Reflux - yes  Dysphagia - no  Abdominal pain - no  Diarrhea - no    ROS:  Constitutional: No fevers, chills, No weight loss  ENT: No allergies  CV: No chest pain  Pulm: No cough, No shortness of breath  Ophtho: No vision changes  GI: see HPI  Medical History:  has a past medical history of Headache and Heartburn.    Surgical History:  has a past surgical history that includes Nasal stenosis repair (N/A, 11/7/2022) and Nasal septoplasty (N/A, 11/7/2022).    Family History: family history includes Dementia in his mother; LUIS disease in his father; Guillain-[place] syndrome in his brother; Hypertension in his brother and father; Thyroid disease in his father.. Otherwise no colon cancer, inflammatory bowel disease, or GI malignancies.    Social History:  reports that he has been smoking. He has never been exposed to tobacco smoke. He has never used smokeless tobacco. He reports that he does not currently use alcohol. He reports current drug use. Drug: Marijuana.    Review of patient's allergies indicates:  No Known Allergies    Medications:   Medications Prior to Admission   Medication Sig Dispense Refill Last Dose    aspirin 325 MG tablet Take 325 mg by mouth once daily.   2/10/2024    azelastine (ASTELIN) 137 mcg (0.1 %) nasal spray 1 spray (137 mcg total) by Nasal route 2 (two) times daily. 30 mL 11 2/13/2024    ibuprofen (ADVIL,MOTRIN) 100 MG tablet Take 100 mg by mouth every 6 (six) hours as needed for Temperature greater than.   Past Week    losartan (COZAAR) 25 MG tablet Take 1 tablet (25 mg total) by mouth once daily. 30 tablet 11 2/13/2024 at 2000    omeprazole (PRILOSEC) 20 MG  capsule Take 1 capsule (20 mg total) by mouth once daily. 30 capsule 11 2/13/2024 at 2000       Objective Findings:    Vital Signs: Per nursing notes.    Physical Exam:  General Appearance: Well appearing in no acute distress  Head:   Normocephalic, without obvious abnormality  Eyes:    No scleral icterus  Airway: Open  Neck: No restriction in mobility  Lungs: CTA bilaterally in anterior and posterior fields, no wheezes, no crackles.  Heart:  Regular rate and rhythm, S1, S2 normal, no murmurs heard  Abdomen: Soft, non tender, non distended      Labs:  Lab Results   Component Value Date    WBC 7.14 11/06/2023    HGB 15.9 11/06/2023    HCT 47.6 11/06/2023     11/06/2023    CHOL 201 (H) 11/06/2023    TRIG 75 11/06/2023    HDL 48 11/06/2023    ALT 18 11/06/2023    AST 23 11/06/2023     11/06/2023    K 4.9 11/06/2023     11/06/2023    CREATININE 0.9 11/06/2023    BUN 12 11/06/2023    CO2 25 11/06/2023    TSH 1.179 11/06/2023    PSA 0.77 09/14/2023    INR 1.2 11/08/2010    HGBA1C 5.2 11/06/2023         I have explained the risks and benefits of endoscopy procedures to the patient including but not limited to bleeding, perforation, infection, and death.    Thank you so much for allowing me to participate in the care of Silverio Jay MD

## 2024-02-15 NOTE — ANESTHESIA POSTPROCEDURE EVALUATION
Anesthesia Post Evaluation    Patient: Silverio Devlin    Procedure(s) Performed: Procedure(s) (LRB):  EGD (ESOPHAGOGASTRODUODENOSCOPY) (N/A)  COLONOSCOPY (N/A)    Final Anesthesia Type: general      Patient location during evaluation: GI PACU  Patient participation: Yes- Able to Participate  Level of consciousness: awake and alert and oriented  Post-procedure vital signs: reviewed and stable  Pain management: adequate  Airway patency: patent    PONV status at discharge: No PONV  Anesthetic complications: no      Cardiovascular status: hemodynamically stable  Respiratory status: unassisted  Hydration status: euvolemic  Follow-up not needed.              Vitals Value Taken Time   /100 02/14/24 1533   Temp 36.6 °C (97.9 °F) 02/14/24 1515   Pulse 62 02/14/24 1535   Resp 25 02/14/24 1534   SpO2 97 % 02/14/24 1535   Vitals shown include unvalidated device data.      Event Time   Out of Recovery 15:30:00         Pain/Lisa Score: Lisa Score: 10 (2/14/2024  3:30 PM)

## 2024-02-19 LAB
FINAL PATHOLOGIC DIAGNOSIS: NORMAL
GROSS: NORMAL
Lab: NORMAL

## 2024-02-20 ENCOUNTER — TELEPHONE (OUTPATIENT)
Dept: GASTROENTEROLOGY | Facility: CLINIC | Age: 59
End: 2024-02-20
Payer: COMMERCIAL

## 2024-02-20 NOTE — TELEPHONE ENCOUNTER
----- Message from Kvng Jay MD sent at 2/20/2024 11:16 AM CST -----  Please call and notify patient, the colon polyps were benign.

## 2024-04-23 ENCOUNTER — TELEPHONE (OUTPATIENT)
Dept: INTERNAL MEDICINE | Facility: CLINIC | Age: 59
End: 2024-04-23
Payer: COMMERCIAL

## 2024-04-23 DIAGNOSIS — K64.9 HEMORRHOIDS, UNSPECIFIED HEMORRHOID TYPE: Primary | ICD-10-CM

## 2024-04-23 NOTE — TELEPHONE ENCOUNTER
Reason for visit: Referral, hemorrhoid      Comments:   Completed endoscopy/colonoscopy on Feb 14th, now need to address ongoing hemorrhoid issue

## 2024-06-05 ENCOUNTER — OFFICE VISIT (OUTPATIENT)
Dept: SURGERY | Facility: CLINIC | Age: 59
End: 2024-06-05
Attending: COLON & RECTAL SURGERY
Payer: COMMERCIAL

## 2024-06-05 VITALS
WEIGHT: 246.94 LBS | HEART RATE: 73 BPM | DIASTOLIC BLOOD PRESSURE: 87 MMHG | SYSTOLIC BLOOD PRESSURE: 127 MMHG | BODY MASS INDEX: 36.46 KG/M2

## 2024-06-05 DIAGNOSIS — K64.9 HEMORRHOIDS, UNSPECIFIED HEMORRHOID TYPE: ICD-10-CM

## 2024-06-05 PROCEDURE — 3008F BODY MASS INDEX DOCD: CPT | Mod: CPTII,S$GLB,, | Performed by: COLON & RECTAL SURGERY

## 2024-06-05 PROCEDURE — 3074F SYST BP LT 130 MM HG: CPT | Mod: CPTII,S$GLB,, | Performed by: COLON & RECTAL SURGERY

## 2024-06-05 PROCEDURE — 1160F RVW MEDS BY RX/DR IN RCRD: CPT | Mod: CPTII,S$GLB,, | Performed by: COLON & RECTAL SURGERY

## 2024-06-05 PROCEDURE — 99204 OFFICE O/P NEW MOD 45 MIN: CPT | Mod: 25,S$GLB,, | Performed by: COLON & RECTAL SURGERY

## 2024-06-05 PROCEDURE — 3079F DIAST BP 80-89 MM HG: CPT | Mod: CPTII,S$GLB,, | Performed by: COLON & RECTAL SURGERY

## 2024-06-05 PROCEDURE — 99999 PR PBB SHADOW E&M-EST. PATIENT-LVL III: CPT | Mod: PBBFAC,,, | Performed by: COLON & RECTAL SURGERY

## 2024-06-05 PROCEDURE — 4010F ACE/ARB THERAPY RXD/TAKEN: CPT | Mod: CPTII,S$GLB,, | Performed by: COLON & RECTAL SURGERY

## 2024-06-05 PROCEDURE — 46600 DIAGNOSTIC ANOSCOPY SPX: CPT | Mod: S$GLB,,, | Performed by: COLON & RECTAL SURGERY

## 2024-06-05 PROCEDURE — 1159F MED LIST DOCD IN RCRD: CPT | Mod: CPTII,S$GLB,, | Performed by: COLON & RECTAL SURGERY

## 2024-06-05 RX ORDER — HYDROCORTISONE 25 MG/G
CREAM TOPICAL 2 TIMES DAILY
Qty: 28 G | Refills: 3 | Status: ON HOLD | OUTPATIENT
Start: 2024-06-05

## 2024-06-05 NOTE — PROGRESS NOTES
CRS Office Visit History and Physical    Referring Md:   Lloyd Armando Md  [address] [place] [address]    SUBJECTIVE:     Chief Complaint: Hemorrhoid    History of Present Illness:  The patient is new patient to this practice.   Course is as follows:  Patient is a 59 y.o. male presents with hemorrhoid.  Symptoms have been present for 2 years.  Has a lump that swells every 2-3 months.  Occasional drainage of blood.  Prior abdominal surgery: No  Prior pelvic or anorectal surgery: No  Family history of colon/rectal cancer: No  Family history of IBD: No  Steroid or other immunosuppression: No  Blood thinners: Yes - ASA  Current stool softeners or fiber supplement: No  Active smoking: No      [place] (FI):  Leakage of solid stool:  Never (0)         Leakage of liquid stool:  Never (0)       Leakage of flatus:      Rarely (1)        Wear a pad:    Never (0)        Alter life:    Never (0)       Total: 1    Altomare (ODS):  Total: 0    Stool consistency/Hanson: 1/2  Bowel movements per month:  Daily   Leakage of urine: No  Trouble emptying your bladder: No    Last Colonoscopy: Feb 2024  - Hemorrhoids found on perianal exam.   - Two large polyps were resected and retrieved. Three hemoclips were placed.   - Two small polyps were resected and retrieved.   - Diverticulosis in the sigmoid colon.     Path:  2. Ascending colon, polyp, polypectomy:Tubular adenoma.  3. Transverse colon, polyp, polypectomy:   Tubular adenoma.   4. Sigmoid colon, polyp, polypectomy:Tubulovillous adenoma.   Polypectomy margins are negative for dysplasia.   5. Sigmoid colon, polyp, polypectomy:   Tubulovillous adenoma.   Polypectomy margins are negative for dysplasia.     Review of patient's allergies indicates:  No Known Allergies    Past Medical History:   Diagnosis Date    Headache     Heartburn      Past Surgical History:   Procedure Laterality Date    COLONOSCOPY N/A 2/14/2024    Procedure: COLONOSCOPY;  Surgeon: Pierre  Kvgn CARDOZA MD;  Location: [facility] ENDOSCOPY;  Service: Endoscopy;  Laterality: N/A;  Referral: Lloyd Armando MD / Rober[place] / [facility] portal / LW  2/7- pc complete. DBM  2-12 pre call complete [facility]    ESOPHAGOGASTRODUODENOSCOPY N/A 2/14/2024    Procedure: EGD (ESOPHAGOGASTRODUODENOSCOPY);  Surgeon: Kvng Jay MD;  Location: [facility] ENDOSCOPY;  Service: Endoscopy;  Laterality: N/A;    NASAL SEPTOPLASTY N/A 11/7/2022    Procedure: SEPTOPLASTY, NOSE;  Surgeon: Gwyn Mendez MD;  Location: [facility] OR;  Service: ENT;  Laterality: N/A;    NASAL STENOSIS REPAIR N/A 11/7/2022    Procedure: REPAIR, STENOSIS, NOSE, VESTIBULE;  Surgeon: Gwyn Mendez MD;  Location: [facility] OR;  Service: ENT;  Laterality: N/A;  3.5 HRS     Family History   Problem Relation Name Age of Onset    Dementia Mother      Hypertension Father      Thyroid disease Father      LUIS disease Father      Hypertension Brother      Guillain-[place] syndrome Brother       Social History     Tobacco Use    Smoking status: Some Days     Passive exposure: Never    Smokeless tobacco: Never    Tobacco comments:     1 cig a day x 30 years    Substance Use Topics    Alcohol use: Not Currently    Drug use: Yes     Types: Marijuana     Comment: rare        Review of Systems:  ROS    OBJECTIVE:     Vital Signs (Most Recent)  /87   Pulse 73   Wt 112 kg (246 lb 14.6 oz)   BMI 36.46 kg/m²     Physical Exam:  General: White male in no distress   Neuro: Alert and oriented x 4.  Moves all extremities.     HEENT: No icterus.  Trachea midline  Respiratory: Respirations are even and unlabored  Cardiac: Regular rate  Extremities: Warm dry and intact  Skin: No rashes     Patient was examined w/ a chaperone present.    Anorectal:   External exam: Normal perianal skin, no thrombosed hemorrhoids or skin tags, no visible fistula opening  Digital exam revealed normal tone. No masses.  No induration/fistula tract.    Anoscopy:  Verbal consent was obtained.   Clear plastic anoscope  inserted.    Grade II/III hemorrhoids seen.      ASSESSMENT/PLAN:     58yo M w/ intermittent perianal swelling, discussed that differential includes fistula vs hemorrhoid    The patient was instructed to:  Increase water intake to at least 8-10 glasses of water per day.  Take a daily fiber supplement (Konsyl, Benefiber, Metamucil) and increase dietary intake to 20-30 grams/day.  Avoid straining or spending >5min/event with bowel movements.  RTC during time of swelling for repeat exam    Tila Vizcarra MD  Staff Surgeon, Colon and Rectal Surgery  [facility]

## 2024-06-17 ENCOUNTER — HOSPITAL ENCOUNTER (INPATIENT)
Facility: HOSPITAL | Age: 59
LOS: 2 days | Discharge: HOME | DRG: 309 | End: 2024-06-20
Attending: EMERGENCY MEDICINE | Admitting: STUDENT IN AN ORGANIZED HEALTH CARE EDUCATION/TRAINING PROGRAM
Payer: COMMERCIAL

## 2024-06-17 DIAGNOSIS — I48.91 ATRIAL FIBRILLATION WITH RVR: Primary | ICD-10-CM

## 2024-06-17 DIAGNOSIS — Z63.4 BEREAVEMENT: ICD-10-CM

## 2024-06-17 DIAGNOSIS — F10.930 ALCOHOL WITHDRAWAL SYNDROME WITHOUT COMPLICATION: ICD-10-CM

## 2024-06-17 DIAGNOSIS — F10.929 ALCOHOLIC INTOXICATION WITH COMPLICATION: ICD-10-CM

## 2024-06-17 DIAGNOSIS — R07.9 CHEST PAIN: ICD-10-CM

## 2024-06-17 DIAGNOSIS — I48.91 ATRIAL FIBRILLATION WITH RAPID VENTRICULAR RESPONSE: ICD-10-CM

## 2024-06-17 DIAGNOSIS — R41.82 AMS (ALTERED MENTAL STATUS): ICD-10-CM

## 2024-06-17 LAB
ALBUMIN SERPL BCP-MCNC: 4.3 G/DL (ref 3.5–5.2)
ALP SERPL-CCNC: 86 U/L (ref 55–135)
ALT SERPL W/O P-5'-P-CCNC: 78 U/L (ref 10–44)
ANION GAP SERPL CALC-SCNC: 18 MMOL/L (ref 8–16)
APAP SERPL-MCNC: <3 UG/ML (ref 10–20)
AST SERPL-CCNC: 99 U/L (ref 10–40)
BASOPHILS # BLD AUTO: 0.04 K/UL (ref 0–0.2)
BASOPHILS NFR BLD: 0.8 % (ref 0–1.9)
BILIRUB SERPL-MCNC: 0.4 MG/DL (ref 0.1–1)
BILIRUB UR QL STRIP: NEGATIVE
BNP SERPL-MCNC: 27 PG/ML (ref 0–99)
BUN SERPL-MCNC: 5 MG/DL (ref 6–20)
CALCIUM SERPL-MCNC: 8.9 MG/DL (ref 8.7–10.5)
CHLORIDE SERPL-SCNC: 90 MMOL/L (ref 95–110)
CK SERPL-CCNC: 258 U/L (ref 20–200)
CLARITY UR: CLEAR
CO2 SERPL-SCNC: 24 MMOL/L (ref 23–29)
COLOR UR: COLORLESS
CREAT SERPL-MCNC: 0.8 MG/DL (ref 0.5–1.4)
DIFFERENTIAL METHOD BLD: ABNORMAL
EOSINOPHIL # BLD AUTO: 0 K/UL (ref 0–0.5)
EOSINOPHIL NFR BLD: 0.4 % (ref 0–8)
ERYTHROCYTE [DISTWIDTH] IN BLOOD BY AUTOMATED COUNT: 13.4 % (ref 11.5–14.5)
EST. GFR  (NO RACE VARIABLE): >60 ML/MIN/1.73 M^2
ETHANOL SERPL-MCNC: 411 MG/DL
GLUCOSE SERPL-MCNC: 97 MG/DL (ref 70–110)
GLUCOSE UR QL STRIP: NEGATIVE
HCT VFR BLD AUTO: 47.6 % (ref 40–54)
HGB BLD-MCNC: 17.2 G/DL (ref 14–18)
HGB UR QL STRIP: NEGATIVE
IMM GRANULOCYTES # BLD AUTO: 0.04 K/UL (ref 0–0.04)
IMM GRANULOCYTES NFR BLD AUTO: 0.8 % (ref 0–0.5)
KETONES UR QL STRIP: NEGATIVE
LEUKOCYTE ESTERASE UR QL STRIP: NEGATIVE
LIPASE SERPL-CCNC: 26 U/L (ref 4–60)
LYMPHOCYTES # BLD AUTO: 1.2 K/UL (ref 1–4.8)
LYMPHOCYTES NFR BLD: 23.9 % (ref 18–48)
MAGNESIUM SERPL-MCNC: 2.2 MG/DL (ref 1.6–2.6)
MCH RBC QN AUTO: 33.9 PG (ref 27–31)
MCHC RBC AUTO-ENTMCNC: 36.1 G/DL (ref 32–36)
MCV RBC AUTO: 94 FL (ref 82–98)
MONOCYTES # BLD AUTO: 0.8 K/UL (ref 0.3–1)
MONOCYTES NFR BLD: 16.3 % (ref 4–15)
NEUTROPHILS # BLD AUTO: 2.9 K/UL (ref 1.8–7.7)
NEUTROPHILS NFR BLD: 57.8 % (ref 38–73)
NITRITE UR QL STRIP: NEGATIVE
NRBC BLD-RTO: 0 /100 WBC
PH UR STRIP: 6 [PH] (ref 5–8)
PLATELET # BLD AUTO: 153 K/UL (ref 150–450)
PMV BLD AUTO: 9 FL (ref 9.2–12.9)
POTASSIUM SERPL-SCNC: 4.7 MMOL/L (ref 3.5–5.1)
PROT SERPL-MCNC: 7.9 G/DL (ref 6–8.4)
PROT UR QL STRIP: NEGATIVE
RBC # BLD AUTO: 5.08 M/UL (ref 4.6–6.2)
SALICYLATES SERPL-MCNC: <5 MG/DL (ref 15–30)
SODIUM SERPL-SCNC: 132 MMOL/L (ref 136–145)
SP GR UR STRIP: <1.005 (ref 1–1.03)
TROPONIN I SERPL DL<=0.01 NG/ML-MCNC: 0.01 NG/ML (ref 0–0.03)
TSH SERPL DL<=0.005 MIU/L-ACNC: 1.47 UIU/ML (ref 0.4–4)
URN SPEC COLLECT METH UR: ABNORMAL
UROBILINOGEN UR STRIP-ACNC: NEGATIVE EU/DL
WBC # BLD AUTO: 5.02 K/UL (ref 3.9–12.7)

## 2024-06-17 PROCEDURE — 93010 ELECTROCARDIOGRAM REPORT: CPT | Mod: ,,, | Performed by: INTERNAL MEDICINE

## 2024-06-17 PROCEDURE — 84443 ASSAY THYROID STIM HORMONE: CPT | Performed by: EMERGENCY MEDICINE

## 2024-06-17 PROCEDURE — 83880 ASSAY OF NATRIURETIC PEPTIDE: CPT | Performed by: EMERGENCY MEDICINE

## 2024-06-17 PROCEDURE — 81003 URINALYSIS AUTO W/O SCOPE: CPT | Mod: 59

## 2024-06-17 PROCEDURE — 25000003 PHARM REV CODE 250: Performed by: EMERGENCY MEDICINE

## 2024-06-17 PROCEDURE — 80143 DRUG ASSAY ACETAMINOPHEN: CPT | Performed by: EMERGENCY MEDICINE

## 2024-06-17 PROCEDURE — 80179 DRUG ASSAY SALICYLATE: CPT | Performed by: EMERGENCY MEDICINE

## 2024-06-17 PROCEDURE — 82077 ASSAY SPEC XCP UR&BREATH IA: CPT

## 2024-06-17 PROCEDURE — 80053 COMPREHEN METABOLIC PANEL: CPT

## 2024-06-17 PROCEDURE — 84484 ASSAY OF TROPONIN QUANT: CPT | Performed by: EMERGENCY MEDICINE

## 2024-06-17 PROCEDURE — 85025 COMPLETE CBC W/AUTO DIFF WBC: CPT

## 2024-06-17 PROCEDURE — 83690 ASSAY OF LIPASE: CPT

## 2024-06-17 PROCEDURE — 93005 ELECTROCARDIOGRAM TRACING: CPT

## 2024-06-17 PROCEDURE — 99291 CRITICAL CARE FIRST HOUR: CPT

## 2024-06-17 PROCEDURE — 80307 DRUG TEST PRSMV CHEM ANLYZR: CPT

## 2024-06-17 PROCEDURE — 83735 ASSAY OF MAGNESIUM: CPT

## 2024-06-17 PROCEDURE — 82550 ASSAY OF CK (CPK): CPT | Performed by: EMERGENCY MEDICINE

## 2024-06-17 PROCEDURE — 63600175 PHARM REV CODE 636 W HCPCS: Performed by: EMERGENCY MEDICINE

## 2024-06-17 PROCEDURE — 96361 HYDRATE IV INFUSION ADD-ON: CPT

## 2024-06-17 RX ORDER — LORAZEPAM 2 MG/ML
2 INJECTION INTRAMUSCULAR
Status: DISCONTINUED | OUTPATIENT
Start: 2024-06-17 | End: 2024-06-18

## 2024-06-17 RX ORDER — SODIUM CHLORIDE, SODIUM LACTATE, POTASSIUM CHLORIDE, CALCIUM CHLORIDE 600; 310; 30; 20 MG/100ML; MG/100ML; MG/100ML; MG/100ML
1000 INJECTION, SOLUTION INTRAVENOUS
Status: COMPLETED | OUTPATIENT
Start: 2024-06-17 | End: 2024-06-18

## 2024-06-17 RX ORDER — SODIUM CHLORIDE, SODIUM LACTATE, POTASSIUM CHLORIDE, CALCIUM CHLORIDE 600; 310; 30; 20 MG/100ML; MG/100ML; MG/100ML; MG/100ML
1000 INJECTION, SOLUTION INTRAVENOUS
Status: COMPLETED | OUTPATIENT
Start: 2024-06-17 | End: 2024-06-17

## 2024-06-17 RX ORDER — ALUMINUM HYDROXIDE, MAGNESIUM HYDROXIDE, AND SIMETHICONE 1200; 120; 1200 MG/30ML; MG/30ML; MG/30ML
30 SUSPENSION ORAL ONCE
Status: COMPLETED | OUTPATIENT
Start: 2024-06-17 | End: 2024-06-17

## 2024-06-17 RX ORDER — SODIUM CHLORIDE 9 MG/ML
1000 INJECTION, SOLUTION INTRAVENOUS
Status: DISCONTINUED | OUTPATIENT
Start: 2024-06-17 | End: 2024-06-17

## 2024-06-17 RX ORDER — LIDOCAINE HYDROCHLORIDE 20 MG/ML
15 SOLUTION OROPHARYNGEAL ONCE
Status: COMPLETED | OUTPATIENT
Start: 2024-06-17 | End: 2024-06-17

## 2024-06-17 RX ADMIN — LIDOCAINE HYDROCHLORIDE 15 ML: 20 SOLUTION ORAL at 10:06

## 2024-06-17 RX ADMIN — ALUMINUM HYDROXIDE, MAGNESIUM HYDROXIDE, AND SIMETHICONE 30 ML: 1200; 120; 1200 SUSPENSION ORAL at 10:06

## 2024-06-17 RX ADMIN — SODIUM CHLORIDE, POTASSIUM CHLORIDE, SODIUM LACTATE AND CALCIUM CHLORIDE 1000 ML: 600; 310; 30; 20 INJECTION, SOLUTION INTRAVENOUS at 08:06

## 2024-06-17 RX ADMIN — SODIUM CHLORIDE, POTASSIUM CHLORIDE, SODIUM LACTATE AND CALCIUM CHLORIDE 1000 ML: 600; 310; 30; 20 INJECTION, SOLUTION INTRAVENOUS at 10:06

## 2024-06-17 SDOH — SOCIAL DETERMINANTS OF HEALTH (SDOH): DISSAPEARANCE AND DEATH OF FAMILY MEMBER: Z63.4

## 2024-06-18 PROBLEM — R74.8 ELEVATED LIVER ENZYMES: Status: ACTIVE | Noted: 2024-06-18

## 2024-06-18 PROBLEM — R00.0 SINUS TACHYCARDIA: Status: ACTIVE | Noted: 2024-06-18

## 2024-06-18 PROBLEM — F10.930 ALCOHOL WITHDRAWAL SYNDROME WITHOUT COMPLICATION: Status: ACTIVE | Noted: 2024-06-18

## 2024-06-18 PROBLEM — I48.91 ATRIAL FIBRILLATION WITH RVR: Status: ACTIVE | Noted: 2024-06-18

## 2024-06-18 LAB
AMPHET+METHAMPHET UR QL: NEGATIVE
BARBITURATES UR QL SCN>200 NG/ML: NEGATIVE
BENZODIAZ UR QL SCN>200 NG/ML: NEGATIVE
BZE UR QL SCN: NEGATIVE
CANNABINOIDS UR QL SCN: NEGATIVE
CREAT UR-MCNC: 12.9 MG/DL (ref 23–375)
ETHANOL SERPL-MCNC: 139 MG/DL
ETHANOL SERPL-MCNC: 258 MG/DL
ETHANOL SERPL-MCNC: 52 MG/DL
METHADONE UR QL SCN>300 NG/ML: NEGATIVE
OPIATES UR QL SCN: NEGATIVE
PCP UR QL SCN>25 NG/ML: NEGATIVE
POCT GLUCOSE: 121 MG/DL (ref 70–110)
POCT GLUCOSE: 125 MG/DL (ref 70–110)
TOXICOLOGY INFORMATION: ABNORMAL
TROPONIN I SERPL DL<=0.01 NG/ML-MCNC: <0.006 NG/ML (ref 0–0.03)

## 2024-06-18 PROCEDURE — 94660 CPAP INITIATION&MGMT: CPT

## 2024-06-18 PROCEDURE — 96375 TX/PRO/DX INJ NEW DRUG ADDON: CPT

## 2024-06-18 PROCEDURE — 93005 ELECTROCARDIOGRAM TRACING: CPT

## 2024-06-18 PROCEDURE — 96376 TX/PRO/DX INJ SAME DRUG ADON: CPT

## 2024-06-18 PROCEDURE — 25000003 PHARM REV CODE 250: Performed by: STUDENT IN AN ORGANIZED HEALTH CARE EDUCATION/TRAINING PROGRAM

## 2024-06-18 PROCEDURE — 99900035 HC TECH TIME PER 15 MIN (STAT)

## 2024-06-18 PROCEDURE — 82077 ASSAY SPEC XCP UR&BREATH IA: CPT | Performed by: EMERGENCY MEDICINE

## 2024-06-18 PROCEDURE — G0426 INPT/ED TELECONSULT50: HCPCS | Mod: GT,,, | Performed by: PSYCHIATRY & NEUROLOGY

## 2024-06-18 PROCEDURE — 96365 THER/PROPH/DIAG IV INF INIT: CPT

## 2024-06-18 PROCEDURE — 25000003 PHARM REV CODE 250: Performed by: EMERGENCY MEDICINE

## 2024-06-18 PROCEDURE — 99223 1ST HOSP IP/OBS HIGH 75: CPT | Mod: ,,, | Performed by: NURSE PRACTITIONER

## 2024-06-18 PROCEDURE — 93010 ELECTROCARDIOGRAM REPORT: CPT | Mod: ,,, | Performed by: INTERNAL MEDICINE

## 2024-06-18 PROCEDURE — 63600175 PHARM REV CODE 636 W HCPCS: Performed by: EMERGENCY MEDICINE

## 2024-06-18 PROCEDURE — 93010 ELECTROCARDIOGRAM REPORT: CPT | Mod: 76,,, | Performed by: INTERNAL MEDICINE

## 2024-06-18 PROCEDURE — 84484 ASSAY OF TROPONIN QUANT: CPT | Performed by: EMERGENCY MEDICINE

## 2024-06-18 PROCEDURE — 99223 1ST HOSP IP/OBS HIGH 75: CPT | Mod: ,,, | Performed by: STUDENT IN AN ORGANIZED HEALTH CARE EDUCATION/TRAINING PROGRAM

## 2024-06-18 PROCEDURE — 63600175 PHARM REV CODE 636 W HCPCS: Performed by: NURSE PRACTITIONER

## 2024-06-18 PROCEDURE — 63600175 PHARM REV CODE 636 W HCPCS: Performed by: STUDENT IN AN ORGANIZED HEALTH CARE EDUCATION/TRAINING PROGRAM

## 2024-06-18 PROCEDURE — 82077 ASSAY SPEC XCP UR&BREATH IA: CPT | Mod: 91 | Performed by: STUDENT IN AN ORGANIZED HEALTH CARE EDUCATION/TRAINING PROGRAM

## 2024-06-18 PROCEDURE — 96361 HYDRATE IV INFUSION ADD-ON: CPT

## 2024-06-18 PROCEDURE — 82077 ASSAY SPEC XCP UR&BREATH IA: CPT | Mod: 91 | Performed by: EMERGENCY MEDICINE

## 2024-06-18 PROCEDURE — 96366 THER/PROPH/DIAG IV INF ADDON: CPT

## 2024-06-18 PROCEDURE — 27000190 HC CPAP FULL FACE MASK W/VALVE

## 2024-06-18 PROCEDURE — 12000002 HC ACUTE/MED SURGE SEMI-PRIVATE ROOM

## 2024-06-18 RX ORDER — FOLIC ACID 1 MG/1
1 TABLET ORAL DAILY
Status: DISCONTINUED | OUTPATIENT
Start: 2024-06-18 | End: 2024-06-20 | Stop reason: HOSPADM

## 2024-06-18 RX ORDER — IBUPROFEN 200 MG
16 TABLET ORAL
Status: DISCONTINUED | OUTPATIENT
Start: 2024-06-18 | End: 2024-06-20 | Stop reason: HOSPADM

## 2024-06-18 RX ORDER — LORAZEPAM 2 MG/ML
2 INJECTION INTRAMUSCULAR
Status: COMPLETED | OUTPATIENT
Start: 2024-06-18 | End: 2024-06-18

## 2024-06-18 RX ORDER — DILTIAZEM HYDROCHLORIDE 5 MG/ML
10 INJECTION INTRAVENOUS
Status: COMPLETED | OUTPATIENT
Start: 2024-06-18 | End: 2024-06-18

## 2024-06-18 RX ORDER — SODIUM CHLORIDE 0.9 % (FLUSH) 0.9 %
10 SYRINGE (ML) INJECTION EVERY 12 HOURS PRN
Status: DISCONTINUED | OUTPATIENT
Start: 2024-06-18 | End: 2024-06-20 | Stop reason: HOSPADM

## 2024-06-18 RX ORDER — DIAZEPAM 10 MG/2ML
5 INJECTION INTRAMUSCULAR EVERY 4 HOURS PRN
Status: DISCONTINUED | OUTPATIENT
Start: 2024-06-18 | End: 2024-06-20 | Stop reason: HOSPADM

## 2024-06-18 RX ORDER — PANTOPRAZOLE SODIUM 40 MG/1
40 TABLET, DELAYED RELEASE ORAL DAILY
Status: DISCONTINUED | OUTPATIENT
Start: 2024-06-18 | End: 2024-06-20 | Stop reason: HOSPADM

## 2024-06-18 RX ORDER — ADENOSINE 3 MG/ML
6 INJECTION, SOLUTION INTRAVENOUS
Status: DISCONTINUED | OUTPATIENT
Start: 2024-06-18 | End: 2024-06-18

## 2024-06-18 RX ORDER — ENOXAPARIN SODIUM 150 MG/ML
1 INJECTION SUBCUTANEOUS EVERY 12 HOURS
Status: DISCONTINUED | OUTPATIENT
Start: 2024-06-18 | End: 2024-06-20 | Stop reason: HOSPADM

## 2024-06-18 RX ORDER — GABAPENTIN 100 MG/1
100 CAPSULE ORAL 3 TIMES DAILY
Status: DISCONTINUED | OUTPATIENT
Start: 2024-06-18 | End: 2024-06-19

## 2024-06-18 RX ORDER — DIAZEPAM 5 MG/1
5 TABLET ORAL ONCE
Status: COMPLETED | OUTPATIENT
Start: 2024-06-18 | End: 2024-06-18

## 2024-06-18 RX ORDER — GLUCAGON 1 MG
1 KIT INJECTION
Status: DISCONTINUED | OUTPATIENT
Start: 2024-06-18 | End: 2024-06-20 | Stop reason: HOSPADM

## 2024-06-18 RX ORDER — NALOXONE HCL 0.4 MG/ML
0.02 VIAL (ML) INJECTION
Status: DISCONTINUED | OUTPATIENT
Start: 2024-06-18 | End: 2024-06-20 | Stop reason: HOSPADM

## 2024-06-18 RX ORDER — LOSARTAN POTASSIUM 25 MG/1
25 TABLET ORAL DAILY
Status: DISCONTINUED | OUTPATIENT
Start: 2024-06-18 | End: 2024-06-20 | Stop reason: HOSPADM

## 2024-06-18 RX ORDER — IBUPROFEN 200 MG
24 TABLET ORAL
Status: DISCONTINUED | OUTPATIENT
Start: 2024-06-18 | End: 2024-06-20 | Stop reason: HOSPADM

## 2024-06-18 RX ORDER — ADENOSINE 3 MG/ML
6 INJECTION, SOLUTION INTRAVENOUS
Status: COMPLETED | OUTPATIENT
Start: 2024-06-18 | End: 2024-06-18

## 2024-06-18 RX ORDER — LORAZEPAM 2 MG/ML
2 INJECTION INTRAMUSCULAR
Status: DISCONTINUED | OUTPATIENT
Start: 2024-06-18 | End: 2024-06-18

## 2024-06-18 RX ORDER — ASPIRIN 325 MG
325 TABLET ORAL DAILY
Status: DISCONTINUED | OUTPATIENT
Start: 2024-06-18 | End: 2024-06-18

## 2024-06-18 RX ORDER — DILTIAZEM HCL 1 MG/ML
0-15 INJECTION, SOLUTION INTRAVENOUS
Status: COMPLETED | OUTPATIENT
Start: 2024-06-18 | End: 2024-06-18

## 2024-06-18 RX ADMIN — LORAZEPAM 2 MG: 2 INJECTION INTRAMUSCULAR; INTRAVENOUS at 10:06

## 2024-06-18 RX ADMIN — GABAPENTIN 100 MG: 100 CAPSULE ORAL at 09:06

## 2024-06-18 RX ADMIN — DIAZEPAM 5 MG: 10 INJECTION, SOLUTION INTRAMUSCULAR; INTRAVENOUS at 05:06

## 2024-06-18 RX ADMIN — DIAZEPAM 5 MG: 5 TABLET ORAL at 04:06

## 2024-06-18 RX ADMIN — ENOXAPARIN SODIUM 105 MG: 120 INJECTION SUBCUTANEOUS at 01:06

## 2024-06-18 RX ADMIN — LOSARTAN POTASSIUM 25 MG: 25 TABLET, FILM COATED ORAL at 03:06

## 2024-06-18 RX ADMIN — SODIUM CHLORIDE, POTASSIUM CHLORIDE, SODIUM LACTATE AND CALCIUM CHLORIDE 1000 ML: 600; 310; 30; 20 INJECTION, SOLUTION INTRAVENOUS at 06:06

## 2024-06-18 RX ADMIN — ENOXAPARIN SODIUM 105 MG: 120 INJECTION SUBCUTANEOUS at 09:06

## 2024-06-18 RX ADMIN — DILTIAZEM HYDROCHLORIDE 10 MG: 5 INJECTION INTRAVENOUS at 10:06

## 2024-06-18 RX ADMIN — PANTOPRAZOLE SODIUM 40 MG: 40 TABLET, DELAYED RELEASE ORAL at 03:06

## 2024-06-18 RX ADMIN — ADENOSINE 6 MG: 3 INJECTION, SOLUTION INTRAVENOUS at 12:06

## 2024-06-18 RX ADMIN — AMIODARONE HYDROCHLORIDE 0.5 MG/MIN: 1.8 INJECTION, SOLUTION INTRAVENOUS at 06:06

## 2024-06-18 RX ADMIN — FOLIC ACID 1 MG: 1 TABLET ORAL at 04:06

## 2024-06-18 RX ADMIN — THIAMINE HYDROCHLORIDE 500 MG: 100 INJECTION, SOLUTION INTRAMUSCULAR; INTRAVENOUS at 09:06

## 2024-06-18 RX ADMIN — AMIODARONE HYDROCHLORIDE 1 MG/MIN: 1.8 INJECTION, SOLUTION INTRAVENOUS at 12:06

## 2024-06-18 RX ADMIN — Medication 10 MG/HR: at 10:06

## 2024-06-18 RX ADMIN — ASPIRIN 325 MG ORAL TABLET 325 MG: 325 PILL ORAL at 03:06

## 2024-06-18 RX ADMIN — AMIODARONE HYDROCHLORIDE 0.5 MG/MIN: 1.8 INJECTION, SOLUTION INTRAVENOUS at 11:06

## 2024-06-18 RX ADMIN — LORAZEPAM 2 MG: 2 INJECTION INTRAMUSCULAR; INTRAVENOUS at 06:06

## 2024-06-18 RX ADMIN — DIAZEPAM 5 MG: 10 INJECTION, SOLUTION INTRAMUSCULAR; INTRAVENOUS at 11:06

## 2024-06-18 RX ADMIN — AMIODARONE HYDROCHLORIDE 150 MG: 1.5 INJECTION, SOLUTION INTRAVENOUS at 12:06

## 2024-06-18 NOTE — HPI
Pt is a 60y/o male with medical hx of anxiety to presented to the ED accompanied by wife with complaint of excessive alcohol intake and not feeling well. Per pt, he recently lost his mother and resulted to drinking alcohol to help with his stress. He does have a hx of excessive alcohol intake. He reports drinking 11 cans of beers and 2 bottle of wine, his wife reports seeing a big bottle of vodka. His last alcohol intake was yesterday. In the ED vitals showed elevated HR, EKG was concerning for afib, this is new onset. Lbas showed the following: cbc normal, cmp showed elevated liver enzymes, cpk, alcohol level were all elevated. Imaging showed the following, CT head: no acute intracranial abnormality, CXR : no cardiopulmonary dz, CT cervical: no acute intracranial abmormality. Pt will be admitted for further management of care.

## 2024-06-18 NOTE — ED NOTES
pt resting in bed comfortably with no complaints. VSS, NAD noted, RR even and unlabored. O2 sats remain above 93% on 2L of NC o2. Pt o2 sats drop while asleep.  pt connected to monitors, bed locked and in lowest position, sitter at bedside.

## 2024-06-18 NOTE — ED NOTES
Pt requested cpap machine be taken off because it makes him feel claustrophobic. 02 sat 90's. Pt stand at the bedside to use urinal. Sitter at bedside.

## 2024-06-18 NOTE — ED NOTES
Pt laying in bed resting with eyes closed. Rise and fall of chest noted. Bed locked and in the lowest position, side rails up x2, call bell in reach.

## 2024-06-18 NOTE — SUBJECTIVE & OBJECTIVE
Past Medical History:   Diagnosis Date    Headache     Heartburn        Past Surgical History:   Procedure Laterality Date    COLONOSCOPY N/A 2/14/2024    Procedure: COLONOSCOPY;  Surgeon: Kvng Jay MD;  Location: [facility] ENDOSCOPY;  Service: Endoscopy;  Laterality: N/A;  Referral: Lloyd Armando MD / [facility] / [facility] portal / LW  2/7- pc complete. DBM  2-12 pre call complete [facility]    ESOPHAGOGASTRODUODENOSCOPY N/A 2/14/2024    Procedure: EGD (ESOPHAGOGASTRODUODENOSCOPY);  Surgeon: Kvng Jay MD;  Location: [facility] ENDOSCOPY;  Service: Endoscopy;  Laterality: N/A;    NASAL SEPTOPLASTY N/A 11/7/2022    Procedure: SEPTOPLASTY, NOSE;  Surgeon: Gwyn Mendez MD;  Location: [facility] OR;  Service: ENT;  Laterality: N/A;    NASAL STENOSIS REPAIR N/A 11/7/2022    Procedure: REPAIR, STENOSIS, NOSE, VESTIBULE;  Surgeon: Gwyn Mendez MD;  Location: [facility] OR;  Service: ENT;  Laterality: N/A;  3.5 HRS       Review of patient's allergies indicates:  No Known Allergies    No current facility-administered medications on file prior to encounter.     Current Outpatient Medications on File Prior to Encounter   Medication Sig    aspirin 325 MG tablet Take 325 mg by mouth once daily.    losartan (COZAAR) 25 MG tablet Take 1 tablet (25 mg total) by mouth once daily.    omeprazole (PRILOSEC) 20 MG capsule Take 1 capsule (20 mg total) by mouth once daily.    azelastine (ASTELIN) 137 mcg (0.1 %) nasal spray 1 spray (137 mcg total) by Nasal route 2 (two) times daily.    hydrocortisone (ANUSOL-HC) 2.5 % rectal cream Place rectally 2 (two) times daily.    ibuprofen (ADVIL,MOTRIN) 100 MG tablet Take 100 mg by mouth every 6 (six) hours as needed for Temperature greater than.     Family History       Problem Relation (Age of Onset)    Dementia Mother    LUIS disease Father    Guillain[place] syndrome Brother    Hypertension Father, Brother    Thyroid disease Father          Tobacco Use    Smoking status: Some Days     Passive exposure: Never     Smokeless tobacco: Never    Tobacco comments:     1 cig a day x 30 years    Substance and Sexual Activity    Alcohol use: Not Currently    Drug use: Yes     Types: Marijuana     Comment: rare    Sexual activity: Not on file     Review of Systems   Constitutional:  Negative for activity change, appetite change, chills, diaphoresis and fatigue.   HENT:  Negative for congestion, ear discharge, mouth sores, nosebleeds and sinus pain.    Eyes:  Negative for pain, discharge, redness and itching.   Respiratory:  Positive for chest tightness. Negative for apnea, cough, choking, shortness of breath, wheezing and stridor.    Gastrointestinal:  Positive for abdominal distention. Negative for abdominal pain, anal bleeding, blood in stool, constipation, diarrhea, nausea, rectal pain and vomiting.   Endocrine: Negative for cold intolerance.   Genitourinary:  Negative for difficulty urinating and frequency.   Musculoskeletal:  Negative for arthralgias.   Psychiatric/Behavioral:  Positive for agitation. Negative for behavioral problems, confusion, dysphoric mood and hallucinations. The patient is nervous/anxious. The patient is not hyperactive.      Objective:     Vital Signs (Most Recent):  Temp: 98.4 °F (36.9 °C) (06/18/24 1147)  Pulse: (!) 164 (06/18/24 1601)  Resp: (!) 29 (06/18/24 1601)  BP: (!) 138/105 (06/18/24 1601)  SpO2: 100 % (06/18/24 1601) Vital Signs (24h Range):  Temp:  [98.2 °F (36.8 °C)-98.4 °F (36.9 °C)] 98.4 °F (36.9 °C)  Pulse:  [] 164  Resp:  [15-35] 29  SpO2:  [76 %-100 %] 100 %  BP: (107-180)/() 138/105     Weight: 111.1 kg (245 lb)  Body mass index is 36.18 kg/m².     Physical Exam  Constitutional:       General: He is not in acute distress.     Appearance: He is obese. He is toxic-appearing. He is not ill-appearing or diaphoretic.   HENT:      Head: Normocephalic and atraumatic.      Right Ear: External ear normal.      Nose: Nose normal.   Eyes:      General: No scleral icterus.        Right  eye: No discharge.         Left eye: No discharge.      Extraocular Movements: Extraocular movements intact.      Conjunctiva/sclera: Conjunctivae normal.      Pupils: Pupils are equal, round, and reactive to light.   Cardiovascular:      Rate and Rhythm: Tachycardia present.      Pulses: Normal pulses.      Heart sounds: Normal heart sounds.   Pulmonary:      Effort: Pulmonary effort is normal. No respiratory distress.      Breath sounds: No wheezing.   Abdominal:      General: Bowel sounds are normal. There is distension.      Tenderness: There is no abdominal tenderness. There is no guarding.   Musculoskeletal:         General: Normal range of motion.      Cervical back: Normal range of motion.   Skin:     General: Skin is warm.   Neurological:      General: No focal deficit present.      Mental Status: He is alert and oriented to person, place, and time.      Comments: Appears intoxicated but alert and oriented , can answer questions and follow command   Psychiatric:      Comments: Nervous and gittery              CRANIAL NERVES     CN III, IV, VI   Pupils are equal, round, and reactive to light.       Significant Labs: All pertinent labs within the past 24 hours have been reviewed.    Significant Imaging: I have reviewed all pertinent imaging results/findings within the past 24 hours.

## 2024-06-18 NOTE — ED PROVIDER NOTES
Encounter Date: 2024       History     Chief Complaint   Patient presents with    Alcohol Intoxication     Pt's wife states pt has been on alcohol binge for approx 1.5 weeks. She reports he has had approx 12 beers, a bottle of wine and vodka every day. Last binge was 5 years ago. Pt slurring words and smells of alcohol. Wife reports pt's mother  last week. No SI/HI.     Patient presents with wife.  The chief complaint is alcohol intoxication.  The wife states that he has been drinking daily for about 1-1/2 years after being sober for 6 years.  She states that on the  his mother  and this is significantly increased his alcohol consumption.  When asked about suicidal ideations he has not very forthcoming with information.  He has no other acute complaints.    The history is provided by the patient and a relative.     Review of patient's allergies indicates:  No Known Allergies  Past Medical History:   Diagnosis Date    Headache     Heartburn      Past Surgical History:   Procedure Laterality Date    COLONOSCOPY N/A 2024    Procedure: COLONOSCOPY;  Surgeon: Kvng Jay MD;  Location: [facility] ENDOSCOPY;  Service: Endoscopy;  Laterality: N/A;  Referral: Lloyd Armando MD / Roberth / Nita portal / LW  - pc complete. [facility]  2-12 pre call complete [facility]    ESOPHAGOGASTRODUODENOSCOPY N/A 2024    Procedure: EGD (ESOPHAGOGASTRODUODENOSCOPY);  Surgeon: Kvng Jay MD;  Location: [facility] ENDOSCOPY;  Service: Endoscopy;  Laterality: N/A;    NASAL SEPTOPLASTY N/A 2022    Procedure: SEPTOPLASTY, NOSE;  Surgeon: Gwyn Mendez MD;  Location: [facility] OR;  Service: ENT;  Laterality: N/A;    NASAL STENOSIS REPAIR N/A 2022    Procedure: REPAIR, STENOSIS, NOSE, VESTIBULE;  Surgeon: Gwyn Mendez MD;  Location: [facility] OR;  Service: ENT;  Laterality: N/A;  3.5 HRS     Family History   Problem Relation Name Age of Onset    Dementia Mother      Hypertension Father      Thyroid disease Father      LUIS  disease Father      Hypertension Brother      Guillain-River syndrome Brother       Social History     Tobacco Use    Smoking status: Some Days     Passive exposure: Never    Smokeless tobacco: Never    Tobacco comments:     1 cig a day x 30 years    Substance Use Topics    Alcohol use: Not Currently    Drug use: Yes     Types: Marijuana     Comment: rare     Review of Systems   Neurological:  Negative for headaches.       Physical Exam     Initial Vitals [06/17/24 1815]   BP Pulse Resp Temp SpO2   (!) 129/91 74 20 98.2 °F (36.8 °C) 95 %      MAP       --         Physical Exam    Vitals reviewed.  Constitutional: No distress.   Cardiovascular:  Normal rate and regular rhythm.           No murmur heard.  Pulmonary/Chest: Breath sounds normal. He has no wheezes.   Abdominal: Abdomen is soft. He exhibits no distension. There is no abdominal tenderness. There is no rebound.   Musculoskeletal:         General: Normal range of motion.     Neurological:   Patient is intoxicated but alert and oriented x3 and can answer questions and perform basic commands.   Skin: Skin is warm and dry. No rash noted.         ED Course   Procedures  Labs Reviewed   CBC W/ AUTO DIFFERENTIAL - Abnormal; Notable for the following components:       Result Value    MCH 33.9 (*)     MCHC 36.1 (*)     MPV 9.0 (*)     Immature Granulocytes 0.8 (*)     Mono % 16.3 (*)     All other components within normal limits   COMPREHENSIVE METABOLIC PANEL - Abnormal; Notable for the following components:    Sodium 132 (*)     Chloride 90 (*)     BUN 5 (*)     AST 99 (*)     ALT 78 (*)     Anion Gap 18 (*)     All other components within normal limits   ALCOHOL,MEDICAL (ETHANOL) - Abnormal; Notable for the following components:    Alcohol, Serum 411 (*)     All other components within normal limits    Narrative:     alc   critical result(s) called and verbal readback obtained from   josie hartman rn by JAI 06/17/2024 20:50   URINALYSIS, REFLEX TO URINE  CULTURE - Abnormal; Notable for the following components:    Color, UA Colorless (*)     Specific Gravity, UA <1.005 (*)     All other components within normal limits    Narrative:     Specimen Source->Urine   DRUG SCREEN PANEL, URINE EMERGENCY - Abnormal; Notable for the following components:    Creatinine, Urine 12.9 (*)     All other components within normal limits    Narrative:     Specimen Source->Urine   ACETAMINOPHEN LEVEL - Abnormal; Notable for the following components:    Acetaminophen (Tylenol), Serum <3.0 (*)     All other components within normal limits   SALICYLATE LEVEL - Abnormal; Notable for the following components:    Salicylate Lvl <5.0 (*)     All other components within normal limits   CK - Abnormal; Notable for the following components:     (*)     All other components within normal limits   ALCOHOL,MEDICAL (ETHANOL) - Abnormal; Notable for the following components:    Alcohol, Serum 258 (*)     All other components within normal limits   ALCOHOL,MEDICAL (ETHANOL) - Abnormal; Notable for the following components:    Alcohol, Serum 139 (*)     All other components within normal limits   ALCOHOL,MEDICAL (ETHANOL) - Abnormal; Notable for the following components:    Alcohol, Serum 52 (*)     All other components within normal limits   CBC W/ AUTO DIFFERENTIAL - Abnormal; Notable for the following components:    RBC 4.56 (*)     MCH 34.4 (*)     Platelets 128 (*)     MPV 9.1 (*)     Immature Granulocytes 0.6 (*)     Lymph # 0.9 (*)     Lymph % 17.6 (*)     All other components within normal limits   COMPREHENSIVE METABOLIC PANEL - Abnormal; Notable for the following components:    Sodium 133 (*)     Chloride 94 (*)     AST 66 (*)     ALT 72 (*)     All other components within normal limits   POCT GLUCOSE - Abnormal; Notable for the following components:    POCT Glucose 121 (*)     All other components within normal limits   POCT GLUCOSE - Abnormal; Notable for the following components:     POCT Glucose 125 (*)     All other components within normal limits   MAGNESIUM   LIPASE   TSH   TROPONIN I   B-TYPE NATRIURETIC PEPTIDE   TROPONIN I   TROPONIN I        ECG Results              EKG 12-lead (Final result)        Collection Time Result Time QRS Duration OHS QTC Calculation    06/19/24 07:23:38 06/19/24 23:48:07 88 448                     Final result by Interface, Lab In [facility] (06/19/24 23:48:09)                   Narrative:    Test Reason : I48.91,    Vent. Rate : 062 BPM     Atrial Rate : 062 BPM     P-R Int : 182 ms          QRS Dur : 088 ms      QT Int : 442 ms       P-R-T Axes : 050 026 031 degrees     QTc Int : 448 ms    Normal sinus rhythm  Normal ECG  When compared with ECG of 18-JUN-2024 12:48,  Sinus rhythm has replaced Atrial fibrillation  Vent. rate has decreased  BPM  ST no longer depressed in Inferior leads  ST no longer depressed in Anterior-lateral leads  Confirmed by Aman Leyva MD, Jose ([id number]) on 6/19/2024 11:48:04 PM    Referred By: [email or web address]   SELF           Confirmed By:Donaldo Leyva,                                     EKG 12-lead (Final result)        Collection Time Result Time QRS Duration OHS QTC Calculation    06/18/24 12:01:37 06/19/24 16:49:02 80 465                     Final result by Interface, Lab In [facility] (06/19/24 16:49:06)                   Narrative:    Test Reason : I48.91,    Vent. Rate : 164 BPM     Atrial Rate : 174 BPM     P-R Int : 000 ms          QRS Dur : 080 ms      QT Int : 282 ms       P-R-T Axes : 000 059 -10 degrees     QTc Int : 465 ms    Atrial fibrillation with rapid ventricular response  Nonspecific ST abnormality  Abnormal ECG  When compared with ECG of 18-JUN-2024 09:56,  No significant change was found  Confirmed by Moiz Tsai MD ([id number]) on 6/19/2024 4:49:00 PM    Referred By: [email or web address]   SELF           Confirmed By:Moiz Tsai MD                                     EKG 12-lead (Final result)         Collection Time Result Time QRS Duration OHS QTC Calculation    06/18/24 09:56:44 06/19/24 16:48:54 80 462                     Final result by Interface, Lab In [facility] (06/19/24 16:48:58)                   Narrative:    Test Reason : R07.9,    Vent. Rate : 174 BPM     Atrial Rate : 156 BPM     P-R Int : 000 ms          QRS Dur : 080 ms      QT Int : 272 ms       P-R-T Axes : 000 060 -42 degrees     QTc Int : 462 ms    Atrial fibrillation with rapid ventricular response  ST depression, consider subendocardial injury  Nonspecific ST abnormality  Abnormal ECG  When compared with ECG of 17-JUN-2024 19:20,  Atrial fibrillation has replaced Sinus rhythm  Vent. rate has increased BY  89 BPM  Confirmed by Moiz Tsai MD ([id number]) on 6/19/2024 4:48:52 PM    Referred By: [email or web address]   SELF           Confirmed By:Moiz Tsai MD                                     EKG 12-lead (Final result)        Collection Time Result Time QRS Duration OHS QTC Calculation    06/17/24 19:20:33 06/19/24 16:48:34 90 452                     Final result by Interface, Lab In [facility] (06/19/24 16:48:39)                   Narrative:    Test Reason : R41.82,    Vent. Rate : 085 BPM     Atrial Rate : 085 BPM     P-R Int : 208 ms          QRS Dur : 090 ms      QT Int : 380 ms       P-R-T Axes : 092 057 042 degrees     QTc Int : 452 ms      Sinus rhythm with occasional Premature ventricular complexes  marked artifact   Abnormal ECG  When compared with ECG of 01-FEB-2017 16:49,  no comments can be made   Confirmed by Moiz Tsai MD ([id number]) on 6/19/2024 4:48:32 PM    Referred By: [email or web address]   SELF           Confirmed By:Moiz Tsai MD                                  Imaging Results              CT Head Without Contrast (Final result)  Result time 06/17/24 20:05:04      Final result by Mio Beebe MD (06/17/24 20:05:04)                   Impression:      1. No acute intracranial abnormality.  2. No CT evidence of  cervical spine acute osseous traumatic injury.  3. Additional findings as further detailed above.      Electronically signed by: Mio Beebe MD  Date:    06/17/2024  Time:    20:05               Narrative:    EXAMINATION:  CT HEAD WITHOUT CONTRAST; CT CERVICAL SPINE WITHOUT CONTRAST    CLINICAL HISTORY:  Mental status change, unknown cause;; Neck trauma, intoxicated or obtunded (Age >= 16y);    TECHNIQUE:  Low dose axial CT images obtained throughout the head and cervical spine without intravenous contrast. Sagittal and coronal reconstructions were performed.    COMPARISON:  None.    FINDINGS:  Head CT:    Intracranial compartment:    Ventricles and sulci are normal in size for age without evidence of hydrocephalus. No extra-axial blood or fluid collections.  Nonspecific partial empty sella configuration.    The brain parenchyma appears normal. No parenchymal mass, hemorrhage, edema or major vascular distribution infarct.    Skull/extracranial contents (limited evaluation): No fracture. Mild patchy mucosal thickening the maxillary sinuses, slightly increased from prior.  Mastoid air cells are clear.  Imaged portions of the orbits are within normal limits.    Cervical spine CT: Patient motion artifact somewhat limits evaluation.  Straightening of the cervical lordosis.  Vertebral body heights appear relatively maintained without evidence of acute compression fracture.  There is degenerative related grade 1 retrolisthesis of C3 on 4 and C4 on 5.  No acute displaced fracture, dislocation or destructive osseous process.  Mild to moderate degenerative change at the atlantodental interval.  Dens and lateral masses are otherwise well aligned and intact.  No prevertebral soft tissue thickening.  No paraspinal mass or fluid collection.  No subcutaneous emphysema or radiopaque foreign body.  Multilevel degenerative disc disease with loss of disc height, endplate changes, small marginal osteophytes and uncovertebral and facet  arthrosis.    C2-3: No significant spinal canal stenosis or neural foraminal narrowing.    C3-4: Posterior disc osteophyte complex asymmetric to the left resulting in mild acquired canal stenosis.  Mild right and moderate left neural foraminal narrowing.    C4-5: Posterior disc osteophyte complex asymmetric to the right resulting in mild to moderate acquired canal stenosis.  Mild bilateral neural foraminal narrowing, right more than left.    C5-6: Posterior disc osteophyte complex resulting in mild to moderate acquired canal stenosis.  Mild to moderate right and moderate left neural foraminal narrowing.    C6-7: Posterior disc osteophyte complex asymmetric to the left resulting in mild to moderate acquired canal stenosis.  Minimal right and mild-to-moderate left neural foraminal narrowing.    C7-T1: No significant spinal canal stenosis or neural foraminal narrowing.    Included airway is midline and patent.  Imaged lung apices are clear.  Scattered atherosclerotic vascular calcifications noted.                                       CT Cervical Spine Without Contrast (Final result)  Result time 06/17/24 20:05:04      Final result by Mio Beebe MD (06/17/24 20:05:04)                   Impression:      1. No acute intracranial abnormality.  2. No CT evidence of cervical spine acute osseous traumatic injury.  3. Additional findings as further detailed above.      Electronically signed by: Mio Beebe MD  Date:    06/17/2024  Time:    20:05               Narrative:    EXAMINATION:  CT HEAD WITHOUT CONTRAST; CT CERVICAL SPINE WITHOUT CONTRAST    CLINICAL HISTORY:  Mental status change, unknown cause;; Neck trauma, intoxicated or obtunded (Age >= 16y);    TECHNIQUE:  Low dose axial CT images obtained throughout the head and cervical spine without intravenous contrast. Sagittal and coronal reconstructions were performed.    COMPARISON:  None.    FINDINGS:  Head CT:    Intracranial compartment:    Ventricles and sulci  are normal in size for age without evidence of hydrocephalus. No extra-axial blood or fluid collections.  Nonspecific partial empty sella configuration.    The brain parenchyma appears normal. No parenchymal mass, hemorrhage, edema or major vascular distribution infarct.    Skull/extracranial contents (limited evaluation): No fracture. Mild patchy mucosal thickening the maxillary sinuses, slightly increased from prior.  Mastoid air cells are clear.  Imaged portions of the orbits are within normal limits.    Cervical spine CT: Patient motion artifact somewhat limits evaluation.  Straightening of the cervical lordosis.  Vertebral body heights appear relatively maintained without evidence of acute compression fracture.  There is degenerative related grade 1 retrolisthesis of C3 on 4 and C4 on 5.  No acute displaced fracture, dislocation or destructive osseous process.  Mild to moderate degenerative change at the atlantodental interval.  Dens and lateral masses are otherwise well aligned and intact.  No prevertebral soft tissue thickening.  No paraspinal mass or fluid collection.  No subcutaneous emphysema or radiopaque foreign body.  Multilevel degenerative disc disease with loss of disc height, endplate changes, small marginal osteophytes and uncovertebral and facet arthrosis.    C2-3: No significant spinal canal stenosis or neural foraminal narrowing.    C3-4: Posterior disc osteophyte complex asymmetric to the left resulting in mild acquired canal stenosis.  Mild right and moderate left neural foraminal narrowing.    C4-5: Posterior disc osteophyte complex asymmetric to the right resulting in mild to moderate acquired canal stenosis.  Mild bilateral neural foraminal narrowing, right more than left.    C5-6: Posterior disc osteophyte complex resulting in mild to moderate acquired canal stenosis.  Mild to moderate right and moderate left neural foraminal narrowing.    C6-7: Posterior disc osteophyte complex asymmetric  to the left resulting in mild to moderate acquired canal stenosis.  Minimal right and mild-to-moderate left neural foraminal narrowing.    C7-T1: No significant spinal canal stenosis or neural foraminal narrowing.    Included airway is midline and patent.  Imaged lung apices are clear.  Scattered atherosclerotic vascular calcifications noted.                                       X-Ray Chest AP Portable (Final result)  Result time 06/17/24 20:07:18      Final result by Bonilla Metzger DO (06/17/24 20:07:18)                   Impression:      No acute abnormality.      Electronically signed by: Bonilla Metzger  Date:    06/17/2024  Time:    20:07               Narrative:    EXAMINATION:  XR CHEST AP PORTABLE    CLINICAL HISTORY:  altered mental status;    TECHNIQUE:  Single frontal view of the chest was performed.    COMPARISON:  12/04/2007.    FINDINGS:  The lungs are well expanded and clear. No focal opacities are seen. The pleural spaces are clear. The cardiac silhouette is unremarkable. The visualized osseous structures are unremarkable.                                       Medications   amiodarone 360 mg/200 mL (1.8 mg/mL) infusion (0 mg/min Intravenous Stopped 6/18/24 1833)   lactated ringers infusion (0 mLs Intravenous Stopped 6/17/24 2158)   aluminum-magnesium hydroxide-simethicone 200-200-20 mg/5 mL suspension 30 mL (30 mLs Oral Given 6/17/24 2243)     And   LIDOcaine viscous HCl 2% oral solution 15 mL (15 mLs Oral Given 6/17/24 2243)   lactated ringers infusion (0 mLs Intravenous Stopped 6/18/24 0547)   lactated ringers bolus 1,000 mL (0 mLs Intravenous Stopped 6/18/24 0748)   LORazepam injection 2 mg (2 mg Intravenous Given 6/18/24 0648)   LORazepam injection 2 mg (2 mg Intravenous Given 6/18/24 1012)   diltiaZEM injection 10 mg (10 mg Intravenous Given 6/18/24 1012)   diltiaZEM injection 10 mg (10 mg Intravenous Given 6/18/24 1028)   diltiaZEM 125 mg in D5W 125 mL infusion (0 mg/hr Intravenous Stopped  6/18/24 1229)   amiodarone in dextrose 150 mg/100 mL (1.5 mg/mL) loading dose 150 mg (0 mg Intravenous Stopped 6/18/24 1237)   adenosine injection 6 mg (6 mg Intravenous Given 6/18/24 1200)   diazePAM tablet 5 mg (5 mg Oral Given 6/18/24 1622)     Medical Decision Making  Patient care will be handed over to Dr. Webb.  At the current time the patient is under pec and is awaiting tele psychiatry consult secondary to his increase in alcohol intake after the death of his mother and concerns from his wife concerning his mental well being.  They will not speak to him until his alcohol is at a certain level.  I have performed multiple evaluations on this patient given his risk of alcohol withdrawal.  He remains calm and cooperative and is tolerating CPAP machine with no difficulty.  The CPAP machine was placed given hypoxia while sleeping with most likely undiagnosed sleep apnea.  Vital signs have remained stable.  All information was relayed to the overnight physician.  Disposition is pending tele psychiatry consult.    Amount and/or Complexity of Data Reviewed  Labs: ordered. Decision-making details documented in ED Course.  Radiology: ordered. Decision-making details documented in ED Course.    Risk  OTC drugs.  Prescription drug management.  Decision regarding hospitalization.  Risk Details: Differential diagnosis includes but is not limited to:  Alcohol binge drinking, depression, suicidal intent/ideations,alcohol use disorder, substance abuse, hypoglycemia, electrolyte abnormality, intracranial lesion               ED Course as of 06/22/24 1302   Mon Jun 17, 202417, 2024 2018 CBC auto differential(!)  Nonspecific findings [CD]   2018 Urinalysis, Reflex to Urine Culture Urine, Clean Catch(!)  No UTI [CD]   2035 X-Ray Chest AP Portable  No acute findings [CD]   2036 CT Cervical Spine Without Contrast  1. No acute intracranial abnormality.  2. No CT evidence of cervical spine acute osseous traumatic injury.   [CD]   2037 CT  Head Without Contrast  1. No acute intracranial abnormality.  2. No CT evidence of cervical spine acute osseous traumatic injury.   [CD]   2101 Comprehensive metabolic panel(!)  Nonspecific finding [CD]   2101 Ethanol(!!)  Elevated [CD]   2101 CPK(!)  Elevated [CD]   2101 Lipase  Within normal limits [CD]   2101 Salicylate level(!)  With a normal limits [CD]   2102 TSH  Within normal limits [CD]   2102 Acetaminophen level(!)  Within normal limits [CD]   2102 TSH  Within normal limits [CD]   2340 Troponin I  Within normal limits [CD]   2340 Brain natriuretic peptide  Within normal limits [CD]   Tue Jun 18, 2024   0046 Patient has a history of sleep apnea, does not use CPAP, we will use CPAP while in the department. [CD]   0301 Alcohol, Serum(!): 258 [DS]   1005 Notified by nurse that patient now visibly tremulous, HR elevated, endorsing chest pain.   EKG a-fib with RVR, rate 170s. Additional IV ativan and 10 mg diltiazem IV ordered. Will add on cardiac labs and continue to monitor. Anticipate need for medical admission, but will continue PEC and plan for Tele-Psych eval.  [SS]   1038 10 mg IV diltiazem given x2 with only transient response in HR, will start on continuous infusion and discuss with HM.  [SS]      ED Course User Index  [CD] August Olivia DO  [DS] Silverio Webb MD  [SS] Raymundo Echols MD                           Clinical Impression:  Final diagnoses:  [R41.82] AMS (altered mental status)  [F10.929] Alcoholic intoxication with complication  [Z63.4] Bereavement  [R07.9] Chest pain  [I48.91] Atrial fibrillation with RVR (Primary)  [F10.930] Alcohol withdrawal syndrome without complication          ED Disposition Condition    Admit Stable                August Olivia DO  06/22/24 1307

## 2024-06-18 NOTE — H&P
[facility] Emergency Dept  [facility] Medicine  History & Physical    Patient Name: Silverio Devlin  MRN: [id number]  Patient Class: IP- Inpatient  Admission Date: 2024  Attending Physician: Taryn Eller MD   Primary Care Provider: Lloyd Armando MD         Patient information was obtained from patient, spouse/SO, and ER records.     Subjective:     Principal Problem:Atrial fibrillation with RVR    Chief Complaint:   Chief Complaint   Patient presents with    Alcohol Intoxication     Pt's wife states pt has been on alcohol binge for approx 1.5 weeks. She reports he has had approx 12 beers, a bottle of wine and vodka every day. Last binge was 5 years ago. Pt slurring words and smells of alcohol. Wife reports pt's mother  last week. No SI/HI.        HPI: Pt is a 58y/o male with medical hx of anxiety to presented to the ED accompanied by wife with complaint of excessive alcohol intake and not feeling well. Per pt, he recently lost his mother and resulted to drinking alcohol to help with his stress. He does have a hx of excessive alcohol intake. He reports drinking 11 cans of beers and 2 bottle of wine, his wife reports seeing a big bottle of vodka. His last alcohol intake was yesterday. In the ED vitals showed elevated HR, EKG was concerning for afib, this is new onset. Lbas showed the following: cbc normal, cmp showed elevated liver enzymes, cpk, alcohol level were all elevated. Imaging showed the following, CT head: no acute intracranial abnormality, CXR : no cardiopulmonary dz, CT cervical: no acute intracranial abmormality. Pt will be admitted for further management of care.    No new subjective & objective note has been filed under this hospital service since the last note was generated.    Assessment/Plan:     * Atrial fibrillation with RVR  Patient with new onset atrial fibrillation which is controlled currently with Amiodarone, however pt is still in sinus tach  Cardio started pt on amio  drip  Follow recs    Elevated liver enzymes  Secondary to excess alcohol intake  Trend LFT        Alcohol withdrawal syndrome without complication  Binge drinking  Importance of alcohol cessation discussed at length  Admit to ICU as vitals are very unstable  Iv fluid  CIWA score 17  Start diazepam 2 to 5mg IV q 5 mins  X3, then  4 to 10mg  IV x3 until CIWA score is less than 98 or RASS 0to -1  THIAMINE  FOLIC ACID  GABAPENTIN    IF controlled vs not, notify physician  SW consult         Sinus tachycardia  Could be 2/2 to alcohol withdrawal  On amio drip  Cardio on board, pls follow recs      Severe obesity (BMI 35.0-39.9) with comorbidity  Body mass index is 36.18 kg/m². Morbid obesity complicates all aspects of disease management from diagnostic modalities to treatment. Weight loss encouraged and health benefits explained to patient.           VTE Risk Mitigation (From admission, onward)           Ordered     enoxaparin injection 105 mg  Every 12 hours         06/18/24 1154                     ROS   Anxious, palpitation, feeling of not feeling well.    PHYSICAL EXAM  Constitutional:       General: He is not in acute distress.     Appearance: He is obese. He is not toxic-appearing.   HENT:      Head: Normocephalic.      Right Ear: External ear normal.      Left Ear: External ear normal.      Nose: No congestion or rhinorrhea.   Eyes:      General: No scleral icterus.        Right eye: No discharge.         Left eye: No discharge.      Conjunctiva/sclera: Conjunctivae normal.      Pupils: Pupils are equal, round, and reactive to light.   Cardiovascular:      Rate and Rhythm: Tachycardia present.   Pulmonary:      Effort: Pulmonary effort is normal. No respiratory distress.      Breath sounds: No wheezing or rales.   Abdominal:      General: Abdomen is flat. There is distension.      Tenderness: There is no abdominal tenderness. There is no right CVA tenderness or guarding.   Musculoskeletal:         General: No  swelling.   Skin:     General: Skin is warm.   Neurological:      General: No focal deficit present.      Mental Status: He is alert and oriented to person, place, and time. gittery    Psychiatric:         Mood and Affect: Mood normal.         Thought Content: Thought content normal.         Judgment: Judgment normal.              Taryn Eller MD  Department of Hospital Medicine  [place] - Emergency Dept

## 2024-06-18 NOTE — ASSESSMENT & PLAN NOTE
HR currently in the 170s (AF RVR) while sleeping. Initial EKG SR  Did not respond to Cardizem  Amiodarone gtt initiated as well as Lovenox- CHADSVASC 1 but given the risk for CVA with chemical cardioversion it is reasonable to proceed with anticoagulation in the short term. Do not feel he will need long term Amiodarone or DOAC. Feel AF is stress induced in setting of ETOH withdrawal   Needs FRANKO work up as OP  Recommend CIWA protocol   TTE pending

## 2024-06-18 NOTE — CONSULTS
[facility] - Emergency Dept  Cardiology  Consult Note    Patient Name: Silverio Devlin  MRN: [id number]  Admission Date: 6/17/2024  Hospital Length of Stay: 0 days  Code Status: No Order   Attending Provider: No att. providers found   Consulting Provider: Seamus Nascimento NP  Primary Care Physician: Lloyd Armando MD  Principal Problem:<principal problem not specified>    Patient information was obtained from past medical records and ER records.     [facility]  Consult performed by: Seamus Nascimento NP  Consult ordered by: Raymundo Echols MD        Subjective:     Chief Complaint:  ETOH withdrawal      HPI:   59 year old male with ETOH abuse, obesity who presented to the ED with ETOH intoxication. Patient developed AF RVR overnight and his HR is currently 170s while asleep. He was given several doses of Cardizem without significant improvement in HR. BP stable. Amiodarone gtt and Lovenox initiated. Initial EKG SR. He is currently PECd. History limited 2/2 Ativan administration. TTE pending.     Past Medical History:   Diagnosis Date    Headache     Heartburn        Past Surgical History:   Procedure Laterality Date    COLONOSCOPY N/A 2/14/2024    Procedure: COLONOSCOPY;  Surgeon: Kvng Jay MD;  Location: [facility] ENDOSCOPY;  Service: Endoscopy;  Laterality: N/A;  Referral: Lloyd Armando MD / [place] / [facility] portal /   2/7-  complete. [facility]  2-12 pre call complete [facility]    ESOPHAGOGASTRODUODENOSCOPY N/A 2/14/2024    Procedure: EGD (ESOPHAGOGASTRODUODENOSCOPY);  Surgeon: Kvng Jay MD;  Location: [facility] ENDOSCOPY;  Service: Endoscopy;  Laterality: N/A;    NASAL SEPTOPLASTY N/A 11/7/2022    Procedure: SEPTOPLASTY, NOSE;  Surgeon: Gwyn Mendez MD;  Location: [facility] OR;  Service: ENT;  Laterality: N/A;    NASAL STENOSIS REPAIR N/A 11/7/2022    Procedure: REPAIR, STENOSIS, NOSE, VESTIBULE;  Surgeon: Gwyn Mendez MD;  Location: [facility] OR;  Service: ENT;  Laterality: N/A;  3.5  HRS       Review of patient's allergies indicates:  No Known Allergies    No current facility-administered medications on file prior to encounter.     Current Outpatient Medications on File Prior to Encounter   Medication Sig    aspirin 325 MG tablet Take 325 mg by mouth once daily.    losartan (COZAAR) 25 MG tablet Take 1 tablet (25 mg total) by mouth once daily.    omeprazole (PRILOSEC) 20 MG capsule Take 1 capsule (20 mg total) by mouth once daily.    azelastine (ASTELIN) 137 mcg (0.1 %) nasal spray 1 spray (137 mcg total) by Nasal route 2 (two) times daily.    hydrocortisone (ANUSOL-HC) 2.5 % rectal cream Place rectally 2 (two) times daily.    ibuprofen (ADVIL,MOTRIN) 100 MG tablet Take 100 mg by mouth every 6 (six) hours as needed for Temperature greater than.     Family History       Problem Relation (Age of Onset)    Dementia Mother    LUIS disease Father    Guillain-[place] syndrome Brother    Hypertension Father, Brother    Thyroid disease Father          Tobacco Use    Smoking status: Some Days     Passive exposure: Never    Smokeless tobacco: Never    Tobacco comments:     1 cig a day x 30 years    Substance and Sexual Activity    Alcohol use: Not Currently    Drug use: Yes     Types: Marijuana     Comment: rare    Sexual activity: Not on file     Review of Systems   Unable to perform ROS: Mental status change     Objective:     Vital Signs (Most Recent):  Temp: 98.4 °F (36.9 °C) (06/18/24 1147)  Pulse: 96 (06/18/24 1001)  Resp: 15 (06/18/24 1147)  BP: (!) 151/88 (06/18/24 1147)  SpO2: 96 % (06/18/24 1147) Vital Signs (24h Range):  Temp:  [98.2 °F (36.8 °C)-98.4 °F (36.9 °C)] 98.4 °F (36.9 °C)  Pulse:  [] 96  Resp:  [15-35] 15  SpO2:  [76 %-97 %] 96 %  BP: (107-159)/() 151/88     Weight: 111.1 kg (245 lb)  Body mass index is 36.18 kg/m².    SpO2: 96 %         Intake/Output Summary (Last 24 hours) at 6/18/2024 1201  Last data filed at 6/18/2024 0748  Gross per 24 hour   Intake 1999 ml   Output --  "  Net 1999 ml       Lines/Drains/Airways       Peripheral Intravenous Line  Duration                  Peripheral IV - Single Lumen 06/17/24 1959 20 G Anterior;Distal;Right Upper Arm <1 day                     Physical Exam  Constitutional:       General: He is not in acute distress.     Appearance: He is not diaphoretic.   HENT:      Head: Atraumatic.   Eyes:      General:         Right eye: No discharge.         Left eye: No discharge.   Cardiovascular:      Rate and Rhythm: Tachycardia present. Rhythm irregular.   Pulmonary:      Effort: Pulmonary effort is normal.      Breath sounds: No rales.   Abdominal:      General: Bowel sounds are normal.      Palpations: Abdomen is soft.   Skin:     General: Skin is warm and dry.          Significant Labs: BMP:   Recent Labs   Lab 06/17/24 1959   GLU 97   *   K 4.7   CL 90*   CO2 24   BUN 5*   CREATININE 0.8   CALCIUM 8.9   MG 2.2   , CMP   Recent Labs   Lab 06/17/24 1959   *   K 4.7   CL 90*   CO2 24   GLU 97   BUN 5*   CREATININE 0.8   CALCIUM 8.9   PROT 7.9   ALBUMIN 4.3   BILITOT 0.4   ALKPHOS 86   AST 99*   ALT 78*   ANIONGAP 18*   , CBC   Recent Labs   Lab 06/17/24 1959   WBC 5.02   HGB 17.2   HCT 47.6      , INR No results for input(s): "INR", "PROTIME" in the last 48 hours., Lipid Panel No results for input(s): "CHOL", "HDL", "LDLCALC", "TRIG", "CHOLHDL" in the last 48 hours., Troponin   Recent Labs   Lab 06/17/24  2243 06/18/24  1000   TROPONINI 0.015 <0.006   , and All pertinent lab results from the last 24 hours have been reviewed.    Significant Imaging: Echocardiogram: Transthoracic echo (TTE) complete (Cupid Only): No results found for this or any previous visit.  Assessment and Plan:     Atrial fibrillation with RVR  HR currently in the 170s (AF RVR) while sleeping. Initial EKG SR  Did not respond to Cardizem  Amiodarone gtt initiated as well as Lovenox- CHADSVASC 1 but given the risk for CVA with chemical cardioversion it is reasonable " to proceed with anticoagulation in the short term. Do not feel he will need long term Amiodarone or DOAC. Feel AF is stress induced in setting of ETOH withdrawal   Needs FRANKO work up as OP  Recommend CIWA protocol   TTE pending     Severe obesity (BMI 35.0-39.9) with comorbidity  Body mass index is 36.18 kg/m². Morbid obesity complicates all aspects of disease management from diagnostic modalities to treatment. Weight loss encouraged and health benefits explained to patient.             VTE Risk Mitigation (From admission, onward)           Ordered     enoxaparin injection 105 mg  Every 12 hours         06/18/24 0600                    Thank you for your consult. I will follow-up with patient. Please contact us if you have any additional questions.    Seamus Nascimento NP  Cardiology   [place] - Emergency Dept

## 2024-06-18 NOTE — ED NOTES
Pt reports that he feels like cpap machine is not given him as much air. Mask readjusted. Pt o2 sat above 93%.

## 2024-06-18 NOTE — NURSING
RAPID RESPONSE NURSE PROACTIVE ROUNDING NOTE       Time of Visit: 1740    Admit Date: 2024  LOS: 0  Code Status: Full Code   Date of Visit: 2024  : 1965  Age: 59 y.o.  Sex: male  Race: White  Bed: ED 13/EXAM 13:   MRN: [id number]  Was the patient discharged from an ICU this admission? No   Was the patient discharged from a PACU within last 24 hours? No   Did the patient receive conscious sedation/general anesthesia in last 24 hours? No   Was the patient in the ED within the past 24 hours? Yes   Was the patient on NIPPV within the past 24 hours? No   Attending Physician: Taryn Eller MD  Primary Service: Family Medicine   Time spent at the bedside: < 15 min    SITUATION    Notified by  Chart Review/ICU Hold  Reason for alert: Elevated HR    Diagnosis: Atrial fibrillation with RVR   has a past medical history of Headache and Heartburn.    Last Vitals:  Temp: 98.4 °F (36.9 °C) ( 1147)  Pulse: 164 ( 1601)  Resp: 29 ( 1601)  BP: 138/105 ( 1601)  SpO2: 100 % ( 1601)    24 Hour Vitals Range:  Temp:  [98.2 °F (36.8 °C)-98.4 °F (36.9 °C)]   Pulse:  []   Resp:  [15-35]   BP: (107-180)/()   SpO2:  [76 %-100 %]     Clinical Issues: Dysrhythmia    ASSESSMENT/INTERVENTIONS  - Patient with pending transfer to ICU for A.Fib RVR. Currently on Amiodarone drip 1 mg/min. HR remains elevated in 160s.   - PRN Diazepam ordered for CIWA >8. Last CIWA at 1649 was 13. Patient received oral Diazepam at 1622.  - Patient resting in bed, eating dinner. Reports that chest pain and headache has slightly improved.     Discussed plan of care with STACIE Hidalgo    Disposition: pending transfer to ICU    FOLLOW UP  Please notify primary team and rapid response RN with any acute changes in patient's condition.     Rapid Response Sindy Gaspar at [phone]

## 2024-06-18 NOTE — ED NOTES
Patient was given Diazepam 5mg oral @1622. He reports chest pain is better and headache is better. His blood pressure @1739 181/117 with a heart rate of 163. Blood pressure @1742 170/114 with a heart rate of 162. The next Diazepam isn't scheduled until 2022. Secure message sent to providers.

## 2024-06-18 NOTE — ED NOTES
Received report from ROSALIA Cortez RN and assumed care of patient. Patient resting in stretcher comfortable, A&O x 4, with no needs at this time. Will continue to monitor.

## 2024-06-18 NOTE — ED NOTES
Oxygen stats dropped below 80% while patient was asleep, charge nurse notified, patient placed on 2 liters of oxygen via nasal cannula per direction of charge nurse

## 2024-06-18 NOTE — HPI
59 year old male with ETOH abuse, obesity who presented to the ED with ETOH intoxication. Patient developed AF RVR overnight and his HR is currently 170s while asleep. He was given several doses of Cardizem without significant improvement in HR. BP stable. Amiodarone gtt and Lovenox initiated. Initial EKG SR. He is currently PECd. History limited 2/2 Ativan administration. TTE pending.

## 2024-06-18 NOTE — PROVIDER PROGRESS NOTES - EMERGENCY DEPT.
Encounter Date: 6/17/2024    ED Physician Progress Notes        Signed out to me for possible suicidal ideation under PEC in the setting of alcohol intoxication.  In 5-1/2 hours his alcohol level has gone down from 411 to 258.  By 1st order kinetics this means that he metabolizes at a rate 28 per hour, indicating his EtOPH level will be under 100 and then meed criteria for psychaitric evaluation in 5.5 hours from 1:30. Repeat etOh ordered for 7am

## 2024-06-18 NOTE — ED NOTES
Pt educated of the purpose of the monitor. Monitor reinstated. Pt verbalized understanding and dismay.

## 2024-06-18 NOTE — HOSPITAL COURSE
06/18/2024 Per HPI   06/19/2024 Patient converted to SR overnight. Hemodynamically stable. Amiodarone load in progress.

## 2024-06-18 NOTE — PROVIDER PROGRESS NOTES - EMERGENCY DEPT.
This is an assumption of care note.     Upon shift change, the patient was transferred to me from Silverio Webb MD at 6:11 AM in stable condition.     Silverio Devlin is a 59 y.o. male who  has a past medical history of Headache and Heartburn.    Patient presented to ED with chief complaint of alcohol intoxication and passive suicidal ideations after death of his mother.     Workup currently in progress.   Repeat ETOH and tele-psych evaluation pending at this time.    Update:   6:11 AM  Awaiting repeat ETOH to determine eligibility for Tele-Psych evaluation.     10:08 AM  HR elevated.  EKG a-fib with RVR.  IV ativan and IV diltiazem ordered.  Labs added on.    12:08 PM  Patient with persistent HR elevation despite IV diltiazem bolus and drip.   Given trial of IV adenosine without change in HR.  Cardiology consulted for further recommendations, IV amiodarone ordered.   RASHAAD Nascimento evaluated and will follow along.     12:25 PM  [facility] physician contacted for admission and further management.     VITALS:  Vitals:    06/18/24 0922 06/18/24 1001 06/18/24 1012 06/18/24 1147   BP: (!) 137/99 (!) 143/102 (!) 143/102 (!) 151/88   BP Location:   Right arm Left arm   Patient Position:   Lying Lying   Pulse: (!) 167 96     Resp: 16 17  15   Temp:    98.4 °F (36.9 °C)   TempSrc:    Oral   SpO2: 95% (!) 93%  96%   Weight:           EKG interpretation by ED attending physician:  A-fib with RVR, rate 174, non-specific ST changes, no ST elevations, otherwise normal intervals.  Compared with prior 02/2017, a-fib with RVR has replaced NSR.    EKG interpretation by ED attending physician:  A-fib with RVR, rate 173, no ST changes, no acute ischemia, normal intervals.  Compared with prior EKG dated earlier today, grossly stable without significant change.      IMAGING:  Imaging Results              CT Head Without Contrast (Final result)  Result time 06/17/24 20:05:04      Final result by Mio Beebe MD (06/17/24 20:05:04)                    Impression:      1. No acute intracranial abnormality.  2. No CT evidence of cervical spine acute osseous traumatic injury.  3. Additional findings as further detailed above.      Electronically signed by: Mio Beebe MD  Date:    06/17/2024  Time:    20:05               Narrative:    EXAMINATION:  CT HEAD WITHOUT CONTRAST; CT CERVICAL SPINE WITHOUT CONTRAST    CLINICAL HISTORY:  Mental status change, unknown cause;; Neck trauma, intoxicated or obtunded (Age >= 16y);    TECHNIQUE:  Low dose axial CT images obtained throughout the head and cervical spine without intravenous contrast. Sagittal and coronal reconstructions were performed.    COMPARISON:  None.    FINDINGS:  Head CT:    Intracranial compartment:    Ventricles and sulci are normal in size for age without evidence of hydrocephalus. No extra-axial blood or fluid collections.  Nonspecific partial empty sella configuration.    The brain parenchyma appears normal. No parenchymal mass, hemorrhage, edema or major vascular distribution infarct.    Skull/extracranial contents (limited evaluation): No fracture. Mild patchy mucosal thickening the maxillary sinuses, slightly increased from prior.  Mastoid air cells are clear.  Imaged portions of the orbits are within normal limits.    Cervical spine CT: Patient motion artifact somewhat limits evaluation.  Straightening of the cervical lordosis.  Vertebral body heights appear relatively maintained without evidence of acute compression fracture.  There is degenerative related grade 1 retrolisthesis of C3 on 4 and C4 on 5.  No acute displaced fracture, dislocation or destructive osseous process.  Mild to moderate degenerative change at the atlantodental interval.  Dens and lateral masses are otherwise well aligned and intact.  No prevertebral soft tissue thickening.  No paraspinal mass or fluid collection.  No subcutaneous emphysema or radiopaque foreign body.  Multilevel degenerative disc disease with  loss of disc height, endplate changes, small marginal osteophytes and uncovertebral and facet arthrosis.    C2-3: No significant spinal canal stenosis or neural foraminal narrowing.    C3-4: Posterior disc osteophyte complex asymmetric to the left resulting in mild acquired canal stenosis.  Mild right and moderate left neural foraminal narrowing.    C4-5: Posterior disc osteophyte complex asymmetric to the right resulting in mild to moderate acquired canal stenosis.  Mild bilateral neural foraminal narrowing, right more than left.    C5-6: Posterior disc osteophyte complex resulting in mild to moderate acquired canal stenosis.  Mild to moderate right and moderate left neural foraminal narrowing.    C6-7: Posterior disc osteophyte complex asymmetric to the left resulting in mild to moderate acquired canal stenosis.  Minimal right and mild-to-moderate left neural foraminal narrowing.    C7-T1: No significant spinal canal stenosis or neural foraminal narrowing.    Included airway is midline and patent.  Imaged lung apices are clear.  Scattered atherosclerotic vascular calcifications noted.                                       CT Cervical Spine Without Contrast (Final result)  Result time 06/17/24 20:05:04      Final result by Mio Beebe MD (06/17/24 20:05:04)                   Impression:      1. No acute intracranial abnormality.  2. No CT evidence of cervical spine acute osseous traumatic injury.  3. Additional findings as further detailed above.      Electronically signed by: Mio Beebe MD  Date:    06/17/2024  Time:    20:05               Narrative:    EXAMINATION:  CT HEAD WITHOUT CONTRAST; CT CERVICAL SPINE WITHOUT CONTRAST    CLINICAL HISTORY:  Mental status change, unknown cause;; Neck trauma, intoxicated or obtunded (Age >= 16y);    TECHNIQUE:  Low dose axial CT images obtained throughout the head and cervical spine without intravenous contrast. Sagittal and coronal reconstructions were  performed.    COMPARISON:  None.    FINDINGS:  Head CT:    Intracranial compartment:    Ventricles and sulci are normal in size for age without evidence of hydrocephalus. No extra-axial blood or fluid collections.  Nonspecific partial empty sella configuration.    The brain parenchyma appears normal. No parenchymal mass, hemorrhage, edema or major vascular distribution infarct.    Skull/extracranial contents (limited evaluation): No fracture. Mild patchy mucosal thickening the maxillary sinuses, slightly increased from prior.  Mastoid air cells are clear.  Imaged portions of the orbits are within normal limits.    Cervical spine CT: Patient motion artifact somewhat limits evaluation.  Straightening of the cervical lordosis.  Vertebral body heights appear relatively maintained without evidence of acute compression fracture.  There is degenerative related grade 1 retrolisthesis of C3 on 4 and C4 on 5.  No acute displaced fracture, dislocation or destructive osseous process.  Mild to moderate degenerative change at the atlantodental interval.  Dens and lateral masses are otherwise well aligned and intact.  No prevertebral soft tissue thickening.  No paraspinal mass or fluid collection.  No subcutaneous emphysema or radiopaque foreign body.  Multilevel degenerative disc disease with loss of disc height, endplate changes, small marginal osteophytes and uncovertebral and facet arthrosis.    C2-3: No significant spinal canal stenosis or neural foraminal narrowing.    C3-4: Posterior disc osteophyte complex asymmetric to the left resulting in mild acquired canal stenosis.  Mild right and moderate left neural foraminal narrowing.    C4-5: Posterior disc osteophyte complex asymmetric to the right resulting in mild to moderate acquired canal stenosis.  Mild bilateral neural foraminal narrowing, right more than left.    C5-6: Posterior disc osteophyte complex resulting in mild to moderate acquired canal stenosis.  Mild to  moderate right and moderate left neural foraminal narrowing.    C6-7: Posterior disc osteophyte complex asymmetric to the left resulting in mild to moderate acquired canal stenosis.  Minimal right and mild-to-moderate left neural foraminal narrowing.    C7-T1: No significant spinal canal stenosis or neural foraminal narrowing.    Included airway is midline and patent.  Imaged lung apices are clear.  Scattered atherosclerotic vascular calcifications noted.                                       X-Ray Chest AP Portable (Final result)  Result time 06/17/24 20:07:18      Final result by Bonilla Metzger DO (06/17/24 20:07:18)                   Impression:      No acute abnormality.      Electronically signed by: Bonilla Metzger  Date:    06/17/2024  Time:    20:07               Narrative:    EXAMINATION:  XR CHEST AP PORTABLE    CLINICAL HISTORY:  altered mental status;    TECHNIQUE:  Single frontal view of the chest was performed.    COMPARISON:  12/04/2007.    FINDINGS:  The lungs are well expanded and clear. No focal opacities are seen. The pleural spaces are clear. The cardiac silhouette is unremarkable. The visualized osseous structures are unremarkable.                                        LABS:  Labs Reviewed   CBC W/ AUTO DIFFERENTIAL - Abnormal; Notable for the following components:       Result Value    MCH 33.9 (*)     MCHC 36.1 (*)     MPV 9.0 (*)     Immature Granulocytes 0.8 (*)     Mono % 16.3 (*)     All other components within normal limits   COMPREHENSIVE METABOLIC PANEL - Abnormal; Notable for the following components:    Sodium 132 (*)     Chloride 90 (*)     BUN 5 (*)     AST 99 (*)     ALT 78 (*)     Anion Gap 18 (*)     All other components within normal limits   ALCOHOL,MEDICAL (ETHANOL) - Abnormal; Notable for the following components:    Alcohol, Serum 411 (*)     All other components within normal limits    Narrative:     alc   critical result(s) called and verbal readback obtained from    josie hartman rn by CMN2 06/17/2024 20:50   URINALYSIS, REFLEX TO URINE CULTURE - Abnormal; Notable for the following components:    Color, UA Colorless (*)     Specific Gravity, UA <1.005 (*)     All other components within normal limits    Narrative:     Specimen Source->Urine   DRUG SCREEN PANEL, URINE EMERGENCY - Abnormal; Notable for the following components:    Creatinine, Urine 12.9 (*)     All other components within normal limits    Narrative:     Specimen Source->Urine   ACETAMINOPHEN LEVEL - Abnormal; Notable for the following components:    Acetaminophen (Tylenol), Serum <3.0 (*)     All other components within normal limits   SALICYLATE LEVEL - Abnormal; Notable for the following components:    Salicylate Lvl <5.0 (*)     All other components within normal limits   CK - Abnormal; Notable for the following components:     (*)     All other components within normal limits   ALCOHOL,MEDICAL (ETHANOL) - Abnormal; Notable for the following components:    Alcohol, Serum 258 (*)     All other components within normal limits   ALCOHOL,MEDICAL (ETHANOL) - Abnormal; Notable for the following components:    Alcohol, Serum 139 (*)     All other components within normal limits   ALCOHOL,MEDICAL (ETHANOL) - Abnormal; Notable for the following components:    Alcohol, Serum 52 (*)     All other components within normal limits   MAGNESIUM   LIPASE   TSH   TROPONIN I   B-TYPE NATRIURETIC PEPTIDE   TROPONIN I   TROPONIN I   POCT GLUCOSE MONITORING CONTINUOUS         MEDICATIONS:  Medications   enoxaparin injection 105 mg (has no administration in time range)   amiodarone in dextrose 150 mg/100 mL (1.5 mg/mL) loading dose 150 mg (has no administration in time range)   amiodarone 360 mg/200 mL (1.8 mg/mL) infusion (has no administration in time range)   amiodarone 360 mg/200 mL (1.8 mg/mL) infusion (has no administration in time range)   adenosine injection 6 mg (has no administration in time range)   lactated  ringers infusion (0 mLs Intravenous Stopped 6/17/24 2158)   aluminum-magnesium hydroxide-simethicone 200-200-20 mg/5 mL suspension 30 mL (30 mLs Oral Given 6/17/24 2243)     And   LIDOcaine viscous HCl 2% oral solution 15 mL (15 mLs Oral Given 6/17/24 2243)   lactated ringers infusion (0 mLs Intravenous Stopped 6/18/24 0547)   lactated ringers bolus 1,000 mL (0 mLs Intravenous Stopped 6/18/24 0748)   LORazepam injection 2 mg (2 mg Intravenous Given 6/18/24 0648)   LORazepam injection 2 mg (2 mg Intravenous Given 6/18/24 1012)   diltiaZEM injection 10 mg (10 mg Intravenous Given 6/18/24 1012)   diltiaZEM injection 10 mg (10 mg Intravenous Given 6/18/24 1028)   diltiaZEM 125 mg in D5W 125 mL infusion (12 mg/hr Intravenous Rate/Dose Change 6/18/24 1143)     Critical Care    Date/Time: 6/18/2024 10:09 AM    Performed by: Raymundo Echols MD  Authorized by: Raymundo Echols MD  Total critical care time (exclusive of procedural time) : 90 minutes  Critical care was necessary to treat or prevent imminent or life-threatening deterioration of the following conditions: a-fib with RVR.  Subsequent provider of critical care: I assumed direction of critical care for this patient from another provider of my specialty.          IMPRESSION:  1. Atrial fibrillation with RVR    2. AMS (altered mental status)    3. Alcoholic intoxication with complication    4. Bereavement    5. Chest pain    6. Alcohol withdrawal syndrome without complication         DISCHARGE MEDICATIONS:  Current Discharge Medication List          DISPOSITION:  Admitted in serious condition.

## 2024-06-18 NOTE — ASSESSMENT & PLAN NOTE
Patient with new onset atrial fibrillation which is controlled currently with Amiodarone, however pt is still in sinus tach  Cardio started pt on amio drip  Follow recs

## 2024-06-18 NOTE — PHARMACY MED REC
"  [facility]           Pharmacy  Admission Medication History     The home medication history was taken by Tosha Mckeon.      Medication history obtained from Medications listed below were obtained from: Patient/family,Spouse verified home meds    Based on information gathered for medication list, you may go to "Admission" then "Reconcile Home Medications" tabs to review and/or act upon those items.     The home medication list has been updated by the Pharmacy department.   Please read ALL comments highlighted in yellow.   Please address this information as you see fit.    Feel free to contact us if you have any questions or require assistance.        No current facility-administered medications on file prior to encounter.     Current Outpatient Medications on File Prior to Encounter   Medication Sig Dispense Refill    aspirin 325 MG tablet Take 325 mg by mouth once daily.      losartan (COZAAR) 25 MG tablet Take 1 tablet (25 mg total) by mouth once daily. 30 tablet 11    omeprazole (PRILOSEC) 20 MG capsule Take 1 capsule (20 mg total) by mouth once daily. 30 capsule 11    azelastine (ASTELIN) 137 mcg (0.1 %) nasal spray 1 spray (137 mcg total) by Nasal route 2 (two) times daily. 30 mL 11    hydrocortisone (ANUSOL-HC) 2.5 % rectal cream Place rectally 2 (two) times daily. 28 g 3    ibuprofen (ADVIL,MOTRIN) 100 MG tablet Take 100 mg by mouth every 6 (six) hours as needed for Temperature greater than.         Please address this information as you see fit.  Feel free to contact us if you have any questions or require assistance.    Tosha Mckeon  [phone]                .          "

## 2024-06-18 NOTE — ASSESSMENT & PLAN NOTE
Binge drinking  Importance of alcohol cessation discussed at length  Admit to ICU as vitals are very unstable  Iv fluid  CIWA score 17  Start midazolam 2 to 5mg IV q 5 mins  X3, then  4 to 10mg  IV x3 until CIWA score is less than 98 or RASS 0to -1  THIAMINE  FOLIC ACID  GABAPENTIN    IF controlled vs not, notify physician  SW consult

## 2024-06-18 NOTE — SUBJECTIVE & OBJECTIVE
Past Medical History:   Diagnosis Date    Headache     Heartburn        Past Surgical History:   Procedure Laterality Date    COLONOSCOPY N/A 2/14/2024    Procedure: COLONOSCOPY;  Surgeon: Kvng Jay MD;  Location: [facility] ENDOSCOPY;  Service: Endoscopy;  Laterality: N/A;  Referral: Lloyd Armando MD / [place] / [facility] portal / LW  2/7- pc complete. DBM  2-12 pre call complete [facility]    ESOPHAGOGASTRODUODENOSCOPY N/A 2/14/2024    Procedure: EGD (ESOPHAGOGASTRODUODENOSCOPY);  Surgeon: Kvng Jay MD;  Location: [facility] ENDOSCOPY;  Service: Endoscopy;  Laterality: N/A;    NASAL SEPTOPLASTY N/A 11/7/2022    Procedure: SEPTOPLASTY, NOSE;  Surgeon: Gwyn Mendez MD;  Location: [facility] OR;  Service: ENT;  Laterality: N/A;    NASAL STENOSIS REPAIR N/A 11/7/2022    Procedure: REPAIR, STENOSIS, NOSE, VESTIBULE;  Surgeon: Gwyn Mendez MD;  Location: [facility] OR;  Service: ENT;  Laterality: N/A;  3.5 HRS       Review of patient's allergies indicates:  No Known Allergies    No current facility-administered medications on file prior to encounter.     Current Outpatient Medications on File Prior to Encounter   Medication Sig    aspirin 325 MG tablet Take 325 mg by mouth once daily.    losartan (COZAAR) 25 MG tablet Take 1 tablet (25 mg total) by mouth once daily.    omeprazole (PRILOSEC) 20 MG capsule Take 1 capsule (20 mg total) by mouth once daily.    azelastine (ASTELIN) 137 mcg (0.1 %) nasal spray 1 spray (137 mcg total) by Nasal route 2 (two) times daily.    hydrocortisone (ANUSOL-HC) 2.5 % rectal cream Place rectally 2 (two) times daily.    ibuprofen (ADVIL,MOTRIN) 100 MG tablet Take 100 mg by mouth every 6 (six) hours as needed for Temperature greater than.     Family History       Problem Relation (Age of Onset)    Dementia Mother    LUIS disease Father    Guillain-[place] syndrome Brother    Hypertension Father, Brother    Thyroid disease Father          Tobacco Use    Smoking status: Some Days     Passive exposure: Never     Smokeless tobacco: Never    Tobacco comments:     1 cig a day x 30 years    Substance and Sexual Activity    Alcohol use: Not Currently    Drug use: Yes     Types: Marijuana     Comment: rare    Sexual activity: Not on file     Review of Systems   Unable to perform ROS: Mental status change     Objective:     Vital Signs (Most Recent):  Temp: 98.4 °F (36.9 °C) (06/18/24 1147)  Pulse: 96 (06/18/24 1001)  Resp: 15 (06/18/24 1147)  BP: (!) 151/88 (06/18/24 1147)  SpO2: 96 % (06/18/24 1147) Vital Signs (24h Range):  Temp:  [98.2 °F (36.8 °C)-98.4 °F (36.9 °C)] 98.4 °F (36.9 °C)  Pulse:  [] 96  Resp:  [15-35] 15  SpO2:  [76 %-97 %] 96 %  BP: (107-159)/() 151/88     Weight: 111.1 kg (245 lb)  Body mass index is 36.18 kg/m².    SpO2: 96 %         Intake/Output Summary (Last 24 hours) at 6/18/2024 1201  Last data filed at 6/18/2024 0748  Gross per 24 hour   Intake 1999 ml   Output --   Net 1999 ml       Lines/Drains/Airways       Peripheral Intravenous Line  Duration                  Peripheral IV - Single Lumen 06/17/24 1959 20 G Anterior;Distal;Right Upper Arm <1 day                     Physical Exam  Constitutional:       General: He is not in acute distress.     Appearance: He is not diaphoretic.   HENT:      Head: Atraumatic.   Eyes:      General:         Right eye: No discharge.         Left eye: No discharge.   Cardiovascular:      Rate and Rhythm: Tachycardia present. Rhythm irregular.   Pulmonary:      Effort: Pulmonary effort is normal.      Breath sounds: No rales.   Abdominal:      General: Bowel sounds are normal.      Palpations: Abdomen is soft.   Skin:     General: Skin is warm and dry.          Significant Labs: BMP:   Recent Labs   Lab 06/17/24 1959   GLU 97   *   K 4.7   CL 90*   CO2 24   BUN 5*   CREATININE 0.8   CALCIUM 8.9   MG 2.2   , CMP   Recent Labs   Lab 06/17/24 1959   *   K 4.7   CL 90*   CO2 24   GLU 97   BUN 5*   CREATININE 0.8   CALCIUM 8.9   PROT 7.9   ALBUMIN  "4.3   BILITOT 0.4   ALKPHOS 86   AST 99*   ALT 78*   ANIONGAP 18*   , CBC   Recent Labs   Lab 06/17/24  1959   WBC 5.02   HGB 17.2   HCT 47.6      , INR No results for input(s): "INR", "PROTIME" in the last 48 hours., Lipid Panel No results for input(s): "CHOL", "HDL", "LDLCALC", "TRIG", "CHOLHDL" in the last 48 hours., Troponin   Recent Labs   Lab 06/17/24  2243 06/18/24  1000   TROPONINI 0.015 <0.006   , and All pertinent lab results from the last 24 hours have been reviewed.    Significant Imaging: Echocardiogram: Transthoracic echo (TTE) complete (Cupid Only): No results found for this or any previous visit.  "

## 2024-06-18 NOTE — CONSULTS
[facility] Medicine Note    Primary Attending Physician: Taryn Eller MD  ICU Attending: Yessy Marcial MD  ICU Fellow: Damion Martinez MD    Reason for Consult:     Alcohol withdrawal    Subjective:      History of Present Illness:  Silverio Devlin is a 59 y.o. male with a past medical history of hypertension and GERD, who presented on 6/17/2024 for alcohol withdrawal.     Patient reports that he had stopped drinking alcohol for a 10 year period, until covid when he had increased anxiety and resumed drinking again. Since then he has been drinking 750mL of white wine and 750mL of red wine daily. This continued for the past several years, until 5 days prior to admission, when his Mother passed away. Since then, he has been drinking wine almost continuously, feels shaky when he stops. Last drink was in the parking lot immediately prior to presentation, initial Etoh 411. In the ED patient went into A fib with RVR, HR 160s. He received IV diltiazem and adenosine without resolution of RVR. Cardiology was consulted from the ED, started the patient on an amiodarone gtt. Patient was admitted to the ICU for further management of A fib w/ RVR and alcohol withdrawal.      Past Medical History:  Hypertension  GERD    Past Surgical History:  Past Surgical History:   Procedure Laterality Date    COLONOSCOPY N/A 2/14/2024    Procedure: COLONOSCOPY;  Surgeon: Kvng Jay MD;  Location: [facility] ENDOSCOPY;  Service: Endoscopy;  Laterality: N/A;  Referral: Lloyd Armando MD / [place] / Nita portal /   2/7- pc complete. [facility]  2-12 pre call complete [facility]    ESOPHAGOGASTRODUODENOSCOPY N/A 2/14/2024    Procedure: EGD (ESOPHAGOGASTRODUODENOSCOPY);  Surgeon: Kvng Jay MD;  Location: [facility] ENDOSCOPY;  Service: Endoscopy;  Laterality: N/A;    NASAL SEPTOPLASTY N/A 11/7/2022    Procedure: SEPTOPLASTY, NOSE;  Surgeon: Gwyn Mendez MD;  Location: [facility] OR;  Service: ENT;  Laterality: N/A;    NASAL  STENOSIS REPAIR N/A 11/7/2022    Procedure: REPAIR, STENOSIS, NOSE, VESTIBULE;  Surgeon: Gwyn Mendez MD;  Location: [facility];  Service: ENT;  Laterality: N/A;  3.5 HRS       Allergies:  Review of patient's allergies indicates:  No Known Allergies    Medications:   In-Hospital Scheduled Medications:   aspirin  325 mg Oral Daily    enoxparin  1 mg/kg Subcutaneous Q12H (prophylaxis, 0900/2100)    losartan  25 mg Oral Daily    pantoprazole  40 mg Oral Daily      In-Hospital PRN Medications:    Current Facility-Administered Medications:     dextrose 10%, 12.5 g, Intravenous, PRN    dextrose 10%, 25 g, Intravenous, PRN    glucagon (human recombinant), 1 mg, Intramuscular, PRN    glucose, 16 g, Oral, PRN    glucose, 24 g, Oral, PRN    naloxone, 0.02 mg, Intravenous, PRN    sodium chloride 0.9%, 10 mL, Intravenous, Q12H PRN   In-Hospital IV Infusion Medications:   amiodarone in dextrose 5%  1 mg/min Intravenous Continuous 33.3 mL/hr at 06/18/24 1240 1 mg/min at 06/18/24 1240    amiodarone in dextrose 5%  0.5 mg/min Intravenous Continuous          Home Medications:  Prior to Admission medications    Medication Sig Start Date End Date Taking? Authorizing Provider   aspirin 325 MG tablet Take 325 mg by mouth once daily.   Yes Provider, Historical   losartan (COZAAR) 25 MG tablet Take 1 tablet (25 mg total) by mouth once daily. 11/6/23 11/5/24 Yes Lloyd Armando MD   omeprazole (PRILOSEC) 20 MG capsule Take 1 capsule (20 mg total) by mouth once daily. 11/6/23 11/5/24 Yes Lloyd Armando MD   azelastine (ASTELIN) 137 mcg (0.1 %) nasal spray 1 spray (137 mcg total) by Nasal route 2 (two) times daily. 10/23/23 10/22/24  Mitchel Bush MD   hydrocortisone (ANUSOL-HC) 2.5 % rectal cream Place rectally 2 (two) times daily. 6/5/24   Tila Vizcarra MD   ibuprofen (ADVIL,MOTRIN) 100 MG tablet Take 100 mg by mouth every 6 (six) hours as needed for Temperature greater than.    Provider, Historical       Family  History:  Family History   Problem Relation Name Age of Onset    Dementia Mother      Hypertension Father      Thyroid disease Father      LUIS disease Father      Hypertension Brother      Guillain-[place] syndrome Brother         Social History:  Tobacco: 1 cigarette daily x 30 years   Alcohol: significant alcohol use, see HPI   Illicits: THC     Review of Systems:  All other systems are reviewed and are negative, except as stated in above HPI.      Objective:   Last 24 Hour Vital Signs:  BP  Min: 107/72  Max: 160/95  Temp  Av.3 °F (36.8 °C)  Min: 98.2 °F (36.8 °C)  Max: 98.4 °F (36.9 °C)  Pulse  Av.2  Min: 71  Max: 167  Resp  Av.2  Min: 15  Max: 35  SpO2  Av.6 %  Min: 76 %  Max: 98 %  Weight  Av.1 kg (245 lb)  Min: 111.1 kg (245 lb)  Max: 111.1 kg (245 lb)  I/O last 3 completed shifts:  In: 1000 [I.V.:1000]  Out: -     Physical Examination:  Gen: Lying comfortably on bed in NAD, appropriately conversational  HEENT: No trauma noted to head, EOMI, PERRL  Cardio: Tachycardic, irregular rhythm, no murmurs appreciated  Pulm: On 3L NC, CTAB, no wheezing appreciated, no increased WOB  Abdom: Normal bowel sounds, non-tender, non-distended  MSK: Well-perfused and warm, radial pulses intact, no BLE edema  Derm: No open lesions/wounds, dry  Neuro: AAOx3, no focal deficits, able to follow commands, strength and sensation normal, moves extremities spontaneously   Psych: Anxious, appropriate affect    Laboratory:  Trended Lab Data:  Recent Labs     24   WBC 5.02   HGB 17.2   HCT 47.6      *   K 4.7   CL 90*   CO2 24   BUN 5*   CREATININE 0.8   GLU 97   BILITOT 0.4   AST 99*   ALT 78*   ALKPHOS 86   CALCIUM 8.9   ALBUMIN 4.3   PROT 7.9   MG 2.2       Cardiac:   Recent Labs   Lab 243 24  1000   TROPONINI 0.015 <0.006   BNP 27  --        Urinalysis:   Lab Results   Component Value Date    COLORU Colorless (A) 2024    SPECGRAV <1.005 (A) 2024    NITRITE  Negative 06/17/2024    KETONESU Negative 06/17/2024    UROBILINOGEN Negative 06/17/2024       Microbiology:  Microbiology Results (last 7 days)       ** No results found for the last 168 hours. **            Radiology:  CT Head Without Contrast   Final Result      1. No acute intracranial abnormality.   2. No CT evidence of cervical spine acute osseous traumatic injury.   3. Additional findings as further detailed above.         Electronically signed by: Mio Beebe MD   Date:    06/17/2024   Time:    20:05      CT Cervical Spine Without Contrast   Final Result      1. No acute intracranial abnormality.   2. No CT evidence of cervical spine acute osseous traumatic injury.   3. Additional findings as further detailed above.         Electronically signed by: Mio Beebe MD   Date:    06/17/2024   Time:    20:05      X-Ray Chest AP Portable   Final Result      No acute abnormality.         Electronically signed by: Bonilla Metzger   Date:    06/17/2024   Time:    20:07        I have personally reviewed the above labs and imaging.    Current Medications:     Infusions:   amiodarone in dextrose 5%  1 mg/min Intravenous Continuous 33.3 mL/hr at 06/18/24 1240 1 mg/min at 06/18/24 1240    amiodarone in dextrose 5%  0.5 mg/min Intravenous Continuous            Scheduled:   aspirin  325 mg Oral Daily    enoxparin  1 mg/kg Subcutaneous Q12H (prophylaxis, 0900/2100)    losartan  25 mg Oral Daily    pantoprazole  40 mg Oral Daily        PRN:    Current Facility-Administered Medications:     dextrose 10%, 12.5 g, Intravenous, PRN    dextrose 10%, 25 g, Intravenous, PRN    glucagon (human recombinant), 1 mg, Intramuscular, PRN    glucose, 16 g, Oral, PRN    glucose, 24 g, Oral, PRN    naloxone, 0.02 mg, Intravenous, PRN    sodium chloride 0.9%, 10 mL, Intravenous, Q12H PRN     Assessment:     Silverio Devlin is a 59 y.o. male with a past medical history of hypertension and GERD, who presented on 6/17/2024 for alcohol withdrawal.  Typically drinks 2 bottles of wine daily, no history of DT. Last drink was in the parking lot immediately prior to presentation, initial Etoh 411. In the ED patient went into A fib with RVR, HR 160s. He received IV diltiazem and adenosine without resolution of RVR. Cardiology was consulted from the ED, started the patient on an amiodarone gtt. Patient was admitted to the ICU for further management of A fib w/ RVR and alcohol withdrawal.      Plan:     Neuro:  Alcohol Withdrawal  Alcohol use disorder   - Patient endorses drinking 2 bottles of wine daily for the past 3 years  - Denies h/o DT, states that he has withdrawn from alcohol before and will feel shaky and nauseated, no other symptoms   - Start IV Diazepam 5mg PRN CIWA >8   - IV thiamine, PO folic acid ordered   - Encourage alcohol cessation on discharge    Passive suicidal ideation   - Patient endorses passive SI in ED. Subsequently PEC'd  - On my evaluation patient denies SI/HI  - Tele-psych consulted, follow up recs      Cardiovascular/Hemodynamics:  Atrial Fibrillation w/ RVR  - In ED went into RVR HR 160s s/p IV diltiazem and adenosine without resolution of RVR  - Cardiology consulted from ED, started on amiodarone gtt and anticoagulated with treatment dose lovenox CHADSVASC 1   - HR improved to 140s, continue amiodarone  - On tele, admit to ICU    Hypertension  - On admission /91  - Continue home Losartan 25mg daily     Respiratory:   No active issues    GI/FEN:  F: none  E: K>4, Mg>2  N: Cardiac diet     GERD  - Home med: Omeprazole 20mg daily, not on formulary inpatient  - Switch to Protonix while inpatient, resume home med on discharge    ID:   No active issues    Renal:   No active issues    Heme/Onc:   No active issues    Endocrine:  No active issues    TSH: 1.466    Rheum/MSK:  No active issues       Critical Care Daily Checklist:    F: Feeding Goal: Cardiac  Status: Cardiac      AS: Analgesia/Sedation None   T: Thromboembolic Prophylaxis  Treatment dose Lovenox    H: HOB > 300 Yes   U: Stress Ulcer Prophylaxis (if needed) N/A   G: Glucose Control Daily glucose check   B: Bowel Function Stool Occurrence: none   I: Indwelling Catheter (Lines & Zavala) Necessity PIV x 1   D: De-escalation of Antimicrobials/Pharmacotherapies N/A    Plan for the day/ETD Admit to ICU, monitor for signs of alcohol withdrawal     Code Status:  Family/Goals of Care: Full        Torsten Lyman MD  [facility] -II

## 2024-06-18 NOTE — ED NOTES
pt presents to ED with alcoholism. Pt reports binge drinking daily, reports he has increased his alcohol intake d/t his mother's passing last Thursday. Pt denies SI,HI, & AVH.     AAOx3, GCS15, answers questions appropriately and in complete sentences.PERRLA. currently denies CP and SOB. RR labored and tachypenic. pt o2 sats 94% on RA.   full ROM and sensation present to all extremities bilaterally. +2 bilateral radial and pedal pulses present. skin warm and dry, capillary refill less than 3 seconds.  abdomen soft, non tender and non distended. pt currently has no other complaints, denies  n/v, fevers and chills.     pt placed on all monitors, bed locked and in lowest position and sitter at bedside. ED workup in process, will continue to closely monitor.

## 2024-06-19 LAB
ALBUMIN SERPL BCP-MCNC: 3.7 G/DL (ref 3.5–5.2)
ALP SERPL-CCNC: 73 U/L (ref 55–135)
ALT SERPL W/O P-5'-P-CCNC: 72 U/L (ref 10–44)
ANION GAP SERPL CALC-SCNC: 12 MMOL/L (ref 8–16)
APICAL FOUR CHAMBER EJECTION FRACTION: 60 %
APICAL TWO CHAMBER EJECTION FRACTION: 67 %
ASCENDING AORTA: 3.58 CM
AST SERPL-CCNC: 66 U/L (ref 10–40)
AV INDEX (PROSTH): 0.82
AV MEAN GRADIENT: 4 MMHG
AV PEAK GRADIENT: 7 MMHG
AV VALVE AREA BY VELOCITY RATIO: 3.4 CM²
AV VALVE AREA: 2.93 CM²
AV VELOCITY RATIO: 0.95
BASOPHILS # BLD AUTO: 0.03 K/UL (ref 0–0.2)
BASOPHILS NFR BLD: 0.6 % (ref 0–1.9)
BILIRUB SERPL-MCNC: 0.6 MG/DL (ref 0.1–1)
BSA FOR ECHO PROCEDURE: 2.33 M2
BUN SERPL-MCNC: 11 MG/DL (ref 6–20)
CALCIUM SERPL-MCNC: 9.4 MG/DL (ref 8.7–10.5)
CHLORIDE SERPL-SCNC: 94 MMOL/L (ref 95–110)
CO2 SERPL-SCNC: 27 MMOL/L (ref 23–29)
CREAT SERPL-MCNC: 0.9 MG/DL (ref 0.5–1.4)
CV ECHO LV RWT: 0.52 CM
DIFFERENTIAL METHOD BLD: ABNORMAL
DOP CALC AO PEAK VEL: 1.33 M/S
DOP CALC AO VTI: 25.9 CM
DOP CALC LVOT AREA: 3.6 CM2
DOP CALC LVOT DIAMETER: 2.13 CM
DOP CALC LVOT PEAK VEL: 1.27 M/S
DOP CALC LVOT STROKE VOLUME: 75.86 CM3
DOP CALCLVOT PEAK VEL VTI: 21.3 CM
E WAVE DECELERATION TIME: 142.34 MSEC
E/A RATIO: 1.38
E/E' RATIO: 7.24 M/S
ECHO LV POSTERIOR WALL: 1.28 CM (ref 0.6–1.1)
EOSINOPHIL # BLD AUTO: 0 K/UL (ref 0–0.5)
EOSINOPHIL NFR BLD: 0.6 % (ref 0–8)
ERYTHROCYTE [DISTWIDTH] IN BLOOD BY AUTOMATED COUNT: 14 % (ref 11.5–14.5)
EST. GFR  (NO RACE VARIABLE): >60 ML/MIN/1.73 M^2
FRACTIONAL SHORTENING: 31 % (ref 28–44)
GLUCOSE SERPL-MCNC: 104 MG/DL (ref 70–110)
HCT VFR BLD AUTO: 44.1 % (ref 40–54)
HGB BLD-MCNC: 15.7 G/DL (ref 14–18)
IMM GRANULOCYTES # BLD AUTO: 0.03 K/UL (ref 0–0.04)
IMM GRANULOCYTES NFR BLD AUTO: 0.6 % (ref 0–0.5)
INTERVENTRICULAR SEPTUM: 1.17 CM (ref 0.6–1.1)
IVRT: 119.89 MSEC
LA MAJOR: 5.32 CM
LA MINOR: 5.66 CM
LA WIDTH: 3.52 CM
LEFT ATRIUM AREA SYSTOLIC (APICAL 2 CHAMBER): 21.91 CM2
LEFT ATRIUM AREA SYSTOLIC (APICAL 4 CHAMBER): 19.11 CM2
LEFT ATRIUM SIZE: 3.91 CM
LEFT ATRIUM VOLUME INDEX MOD: 25.3 ML/M2
LEFT ATRIUM VOLUME INDEX: 28.5 ML/M2
LEFT ATRIUM VOLUME MOD: 56.97 CM3
LEFT ATRIUM VOLUME: 64.16 CM3
LEFT INTERNAL DIMENSION IN SYSTOLE: 3.43 CM (ref 2.1–4)
LEFT VENTRICLE DIASTOLIC VOLUME INDEX: 51.47 ML/M2
LEFT VENTRICLE DIASTOLIC VOLUME: 115.81 ML
LEFT VENTRICLE END DIASTOLIC VOLUME APICAL 2 CHAMBER: 81.54 ML
LEFT VENTRICLE END DIASTOLIC VOLUME APICAL 4 CHAMBER: 65.48 ML
LEFT VENTRICLE END SYSTOLIC VOLUME APICAL 2 CHAMBER: 63.32 ML
LEFT VENTRICLE END SYSTOLIC VOLUME APICAL 4 CHAMBER: 50.05 ML
LEFT VENTRICLE MASS INDEX: 106 G/M2
LEFT VENTRICLE SYSTOLIC VOLUME INDEX: 21.6 ML/M2
LEFT VENTRICLE SYSTOLIC VOLUME: 48.56 ML
LEFT VENTRICULAR INTERNAL DIMENSION IN DIASTOLE: 4.96 CM (ref 3.5–6)
LEFT VENTRICULAR MASS: 237.59 G
LV LATERAL E/E' RATIO: 6.91 M/S
LV SEPTAL E/E' RATIO: 7.6 M/S
LVED V (TEICH): 115.81 ML
LVES V (TEICH): 48.56 ML
LVOT MG: 3.45 MMHG
LVOT MV: 0.89 CM/S
LYMPHOCYTES # BLD AUTO: 0.9 K/UL (ref 1–4.8)
LYMPHOCYTES NFR BLD: 17.6 % (ref 18–48)
MCH RBC QN AUTO: 34.4 PG (ref 27–31)
MCHC RBC AUTO-ENTMCNC: 35.6 G/DL (ref 32–36)
MCV RBC AUTO: 97 FL (ref 82–98)
MONOCYTES # BLD AUTO: 0.7 K/UL (ref 0.3–1)
MONOCYTES NFR BLD: 14.6 % (ref 4–15)
MV PEAK A VEL: 0.55 M/S
MV PEAK E VEL: 0.76 M/S
MV STENOSIS PRESSURE HALF TIME: 41.28 MS
MV VALVE AREA P 1/2 METHOD: 5.33 CM2
NEUTROPHILS # BLD AUTO: 3.3 K/UL (ref 1.8–7.7)
NEUTROPHILS NFR BLD: 66 % (ref 38–73)
NRBC BLD-RTO: 0 /100 WBC
OHS CV RV/LV RATIO: 0.61 CM
OHS LV EJECTION FRACTION SIMPSONS BIPLANE MOD: 63 %
OHS QRS DURATION: 80 MS
OHS QRS DURATION: 80 MS
OHS QRS DURATION: 88 MS
OHS QRS DURATION: 90 MS
OHS QTC CALCULATION: 448 MS
OHS QTC CALCULATION: 452 MS
OHS QTC CALCULATION: 462 MS
OHS QTC CALCULATION: 465 MS
PLATELET # BLD AUTO: 128 K/UL (ref 150–450)
PMV BLD AUTO: 9.1 FL (ref 9.2–12.9)
POTASSIUM SERPL-SCNC: 4.4 MMOL/L (ref 3.5–5.1)
PROT SERPL-MCNC: 6.7 G/DL (ref 6–8.4)
RA MAJOR: 4.65 CM
RA PRESSURE ESTIMATED: 3 MMHG
RA WIDTH: 3.28 CM
RBC # BLD AUTO: 4.56 M/UL (ref 4.6–6.2)
RIGHT VENTRICULAR END-DIASTOLIC DIMENSION: 3.03 CM
RV TISSUE DOPPLER FREE WALL SYSTOLIC VELOCITY 1 (APICAL 4 CHAMBER VIEW): 16.85 CM/S
SINUS: 3.59 CM
SODIUM SERPL-SCNC: 133 MMOL/L (ref 136–145)
STJ: 2.7 CM
TDI LATERAL: 0.11 M/S
TDI SEPTAL: 0.1 M/S
TDI: 0.11 M/S
TRICUSPID ANNULAR PLANE SYSTOLIC EXCURSION: 2.14 CM
WBC # BLD AUTO: 4.94 K/UL (ref 3.9–12.7)
Z-SCORE OF LEFT VENTRICULAR DIMENSION IN END DIASTOLE: -4.97
Z-SCORE OF LEFT VENTRICULAR DIMENSION IN END SYSTOLE: -2.88

## 2024-06-19 PROCEDURE — 25000003 PHARM REV CODE 250: Performed by: STUDENT IN AN ORGANIZED HEALTH CARE EDUCATION/TRAINING PROGRAM

## 2024-06-19 PROCEDURE — 63600175 PHARM REV CODE 636 W HCPCS: Performed by: STUDENT IN AN ORGANIZED HEALTH CARE EDUCATION/TRAINING PROGRAM

## 2024-06-19 PROCEDURE — 99900035 HC TECH TIME PER 15 MIN (STAT)

## 2024-06-19 PROCEDURE — 11000001 HC ACUTE MED/SURG PRIVATE ROOM

## 2024-06-19 PROCEDURE — 27000190 HC CPAP FULL FACE MASK W/VALVE

## 2024-06-19 PROCEDURE — 94761 N-INVAS EAR/PLS OXIMETRY MLT: CPT

## 2024-06-19 PROCEDURE — 85025 COMPLETE CBC W/AUTO DIFF WBC: CPT

## 2024-06-19 PROCEDURE — 93005 ELECTROCARDIOGRAM TRACING: CPT

## 2024-06-19 PROCEDURE — 94660 CPAP INITIATION&MGMT: CPT

## 2024-06-19 PROCEDURE — 99233 SBSQ HOSP IP/OBS HIGH 50: CPT | Mod: ,,, | Performed by: NURSE PRACTITIONER

## 2024-06-19 PROCEDURE — 80053 COMPREHEN METABOLIC PANEL: CPT

## 2024-06-19 PROCEDURE — 27100171 HC OXYGEN HIGH FLOW UP TO 24 HOURS

## 2024-06-19 PROCEDURE — 93010 ELECTROCARDIOGRAM REPORT: CPT | Mod: ,,, | Performed by: STUDENT IN AN ORGANIZED HEALTH CARE EDUCATION/TRAINING PROGRAM

## 2024-06-19 RX ORDER — DIAZEPAM 5 MG/1
5 TABLET ORAL EVERY 8 HOURS
Status: DISCONTINUED | OUTPATIENT
Start: 2024-06-19 | End: 2024-06-20

## 2024-06-19 RX ORDER — HYDRALAZINE HYDROCHLORIDE 25 MG/1
25 TABLET, FILM COATED ORAL EVERY 8 HOURS
Status: DISCONTINUED | OUTPATIENT
Start: 2024-06-19 | End: 2024-06-20 | Stop reason: HOSPADM

## 2024-06-19 RX ORDER — GABAPENTIN 300 MG/1
300 CAPSULE ORAL EVERY 8 HOURS
Status: DISCONTINUED | OUTPATIENT
Start: 2024-06-19 | End: 2024-06-20 | Stop reason: HOSPADM

## 2024-06-19 RX ORDER — HYDRALAZINE HYDROCHLORIDE 20 MG/ML
10 INJECTION INTRAMUSCULAR; INTRAVENOUS EVERY 4 HOURS PRN
Status: DISCONTINUED | OUTPATIENT
Start: 2024-06-19 | End: 2024-06-20 | Stop reason: HOSPADM

## 2024-06-19 RX ADMIN — THIAMINE HYDROCHLORIDE 500 MG: 100 INJECTION, SOLUTION INTRAMUSCULAR; INTRAVENOUS at 04:06

## 2024-06-19 RX ADMIN — HYDRALAZINE HYDROCHLORIDE 25 MG: 25 TABLET ORAL at 09:06

## 2024-06-19 RX ADMIN — AMIODARONE HYDROCHLORIDE 0.5 MG/MIN: 1.8 INJECTION, SOLUTION INTRAVENOUS at 11:06

## 2024-06-19 RX ADMIN — THIAMINE HYDROCHLORIDE 500 MG: 100 INJECTION, SOLUTION INTRAMUSCULAR; INTRAVENOUS at 09:06

## 2024-06-19 RX ADMIN — GABAPENTIN 100 MG: 100 CAPSULE ORAL at 04:06

## 2024-06-19 RX ADMIN — FOLIC ACID 1 MG: 1 TABLET ORAL at 10:06

## 2024-06-19 RX ADMIN — GABAPENTIN 300 MG: 300 CAPSULE ORAL at 09:06

## 2024-06-19 RX ADMIN — DIAZEPAM 5 MG: 10 INJECTION, SOLUTION INTRAMUSCULAR; INTRAVENOUS at 06:06

## 2024-06-19 RX ADMIN — DIAZEPAM 5 MG: 5 TABLET ORAL at 09:06

## 2024-06-19 RX ADMIN — ENOXAPARIN SODIUM 105 MG: 120 INJECTION SUBCUTANEOUS at 09:06

## 2024-06-19 RX ADMIN — GABAPENTIN 100 MG: 100 CAPSULE ORAL at 10:06

## 2024-06-19 RX ADMIN — PANTOPRAZOLE SODIUM 40 MG: 40 TABLET, DELAYED RELEASE ORAL at 10:06

## 2024-06-19 RX ADMIN — DIAZEPAM 5 MG: 10 INJECTION, SOLUTION INTRAMUSCULAR; INTRAVENOUS at 05:06

## 2024-06-19 RX ADMIN — LOSARTAN POTASSIUM 25 MG: 25 TABLET, FILM COATED ORAL at 10:06

## 2024-06-19 NOTE — ASSESSMENT & PLAN NOTE
Could be 2/2 to alcohol withdrawal  On amio drip wean as tolerated   TTE tomorrow  Cardio on board, pls follow recs

## 2024-06-19 NOTE — ASSESSMENT & PLAN NOTE
Binge drinking  Importance of alcohol cessation discussed at length  Admit to ICU as vitals are very unstable  Iv fluid  CIWA score 17  Start diazepam 2 to 5mg IV q 5 mins  X3, then  4 to 10mg  IV x3 until CIWA score is less than 98 or RASS 0to -1  THIAMINE  FOLIC ACID  GABAPENTIN    IF controlled vs not, notify physician  SW consult     6/19/24:  Diazepam 5mg Q hrs today and gabapentin 100mg Q8hrs today  Continue IV fluid  Monitor vital closely

## 2024-06-19 NOTE — ED NOTES
Consulted cardiology per attending request about discontinuing amiodarone drip. Cardiology would like to continue drip per protocol. Attending notified via secure chat. Drip running at 0.5 mg/min.

## 2024-06-19 NOTE — PROGRESS NOTES
[facility] Emergency Dept  Cardiology  Progress Note    Patient Name: Silverio Devlin  MRN: [id number]  Admission Date: 6/17/2024  Hospital Length of Stay: 1 days  Code Status: Full Code   Attending Physician: Taryn Eller MD   Primary Care Physician: Lloyd Armando MD  Expected Discharge Date:   Principal Problem:Atrial fibrillation with RVR    Subjective:     Hospital Course:   06/18/2024 Per HPI   06/19/2024 Patient converted to SR overnight. Hemodynamically stable. Amiodarone load in progress.     Past Medical History:   Diagnosis Date    Headache     Heartburn        Past Surgical History:   Procedure Laterality Date    COLONOSCOPY N/A 2/14/2024    Procedure: COLONOSCOPY;  Surgeon: Kvng Jay MD;  Location: [facility] ENDOSCOPY;  Service: Endoscopy;  Laterality: N/A;  Referral: Lloyd Armando MD / [place] / Nita portal /   2/7- pc complete. M  2-12 pre call complete [facility]    ESOPHAGOGASTRODUODENOSCOPY N/A 2/14/2024    Procedure: EGD (ESOPHAGOGASTRODUODENOSCOPY);  Surgeon: Kvng Jay MD;  Location: [facility] ENDOSCOPY;  Service: Endoscopy;  Laterality: N/A;    NASAL SEPTOPLASTY N/A 11/7/2022    Procedure: SEPTOPLASTY, NOSE;  Surgeon: Gwyn Mendez MD;  Location: [facility] OR;  Service: ENT;  Laterality: N/A;    NASAL STENOSIS REPAIR N/A 11/7/2022    Procedure: REPAIR, STENOSIS, NOSE, VESTIBULE;  Surgeon: Gwyn Mendez MD;  Location: [facility] OR;  Service: ENT;  Laterality: N/A;  3.5 HRS       Review of patient's allergies indicates:  No Known Allergies    No current facility-administered medications on file prior to encounter.     Current Outpatient Medications on File Prior to Encounter   Medication Sig    aspirin 325 MG tablet Take 325 mg by mouth once daily.    losartan (COZAAR) 25 MG tablet Take 1 tablet (25 mg total) by mouth once daily.    omeprazole (PRILOSEC) 20 MG capsule Take 1 capsule (20 mg total) by mouth once daily.    azelastine (ASTELIN) 137 mcg (0.1 %) nasal spray 1 spray (137 mcg total)  by Nasal route 2 (two) times daily.    hydrocortisone (ANUSOL-HC) 2.5 % rectal cream Place rectally 2 (two) times daily.    ibuprofen (ADVIL,MOTRIN) 100 MG tablet Take 100 mg by mouth every 6 (six) hours as needed for Temperature greater than.     Family History       Problem Relation (Age of Onset)    Dementia Mother    LUIS disease Father    Guillain-[place] syndrome Brother    Hypertension Father, Brother    Thyroid disease Father          Tobacco Use    Smoking status: Some Days     Passive exposure: Never    Smokeless tobacco: Never    Tobacco comments:     1 cig a day x 30 years    Substance and Sexual Activity    Alcohol use: Not Currently    Drug use: Yes     Types: Marijuana     Comment: rare    Sexual activity: Not on file     Review of Systems   Unable to perform ROS: Mental status change     Objective:     Vital Signs (Most Recent):  Temp: 98.4 °F (36.9 °C) (06/18/24 1147)  Pulse: 78 (06/19/24 0809)  Resp: 19 (06/19/24 0809)  BP: (!) 162/107 (06/19/24 0732)  SpO2: 100 % (06/19/24 0809) Vital Signs (24h Range):  Temp:  [98.4 °F (36.9 °C)] 98.4 °F (36.9 °C)  Pulse:  [] 78  Resp:  [15-44] 19  SpO2:  [93 %-100 %] 100 %  BP: (115-180)/() 162/107     Weight: 111.1 kg (245 lb)  Body mass index is 36.18 kg/m².    SpO2: 100 %         Intake/Output Summary (Last 24 hours) at 6/19/2024 0931  Last data filed at 6/18/2024 2224  Gross per 24 hour   Intake 388.94 ml   Output --   Net 388.94 ml       Lines/Drains/Airways       Peripheral Intravenous Line  Duration                  Peripheral IV - Single Lumen 06/17/24 1959 20 G Anterior;Distal;Right Upper Arm 1 day         Peripheral IV - Single Lumen 06/18/24 1230 20 G Left Antecubital <1 day                     Physical Exam  Constitutional:       General: He is not in acute distress.     Appearance: He is not diaphoretic.   HENT:      Head: Atraumatic.   Eyes:      General:         Right eye: No discharge.         Left eye: No discharge.   Cardiovascular:     "  Rate and Rhythm: Normal rate and regular rhythm.   Pulmonary:      Effort: Pulmonary effort is normal.      Breath sounds: No rales.   Abdominal:      General: Bowel sounds are normal.      Palpations: Abdomen is soft.   Skin:     General: Skin is warm and dry.          Significant Labs: BMP:   Recent Labs   Lab 06/17/24 1959   GLU 97   *   K 4.7   CL 90*   CO2 24   BUN 5*   CREATININE 0.8   CALCIUM 8.9   MG 2.2   , CMP   Recent Labs   Lab 06/17/24 1959   *   K 4.7   CL 90*   CO2 24   GLU 97   BUN 5*   CREATININE 0.8   CALCIUM 8.9   PROT 7.9   ALBUMIN 4.3   BILITOT 0.4   ALKPHOS 86   AST 99*   ALT 78*   ANIONGAP 18*   , CBC   Recent Labs   Lab 06/17/24 1959   WBC 5.02   HGB 17.2   HCT 47.6      , INR No results for input(s): "INR", "PROTIME" in the last 48 hours., Lipid Panel No results for input(s): "CHOL", "HDL", "LDLCALC", "TRIG", "CHOLHDL" in the last 48 hours., Troponin   Recent Labs   Lab 06/17/24  2243 06/18/24  1000   TROPONINI 0.015 <0.006   , and All pertinent lab results from the last 24 hours have been reviewed.    Significant Imaging: Echocardiogram: Transthoracic echo (TTE) complete (Cupid Only): No results found for this or any previous visit.  Assessment and Plan:     Brief HPI: Patient seen this morning on rounds, resting quietly and noted to be in SR. Amiodarone load in progress.     * Atrial fibrillation with RVR  HR 170s (AF RVR) while sleeping on 06/18/2024. Initial EKG SR  Did not respond to Cardizem  Amiodarone gtt initiated as well as Lovenox- CHADSVASC 1 but given the risk for CVA with chemical cardioversion it is reasonable to proceed with anticoagulation in the short term. Do not feel he will need long term Amiodarone or DOAC. Feel AF is stress induced in setting of ETOH withdrawal   Needs FRANKO work up as OP  TTE pending   Patient converted to SR overnight- Complete IV Amiodarone load, treat withdrawal, then will convert to BB     Severe obesity (BMI 35.0-39.9) with " comorbidity  Body mass index is 36.18 kg/m². Morbid obesity complicates all aspects of disease management from diagnostic modalities to treatment. Weight loss encouraged and health benefits explained to patient.             VTE Risk Mitigation (From admission, onward)           Ordered     enoxaparin injection 105 mg  Every 12 hours         06/18/24 7717                    Seamus Nascimento NP  Cardiology  [facility] - Emergency Dept

## 2024-06-19 NOTE — ASSESSMENT & PLAN NOTE
HR 170s (AF RVR) while sleeping on 06/18/2024. Initial EKG SR  Did not respond to Cardizem  Amiodarone gtt initiated as well as Lovenox- CHADSVASC 1 but given the risk for CVA with chemical cardioversion it is reasonable to proceed with anticoagulation in the short term. Do not feel he will need long term Amiodarone or DOAC. Feel AF is stress induced in setting of ETOH withdrawal   Needs FRANKO work up as OP  TTE pending   Patient converted to SR overnight- Complete IV Amiodarone load, treat withdrawal, then will convert to BB

## 2024-06-19 NOTE — SUBJECTIVE & OBJECTIVE
Interval History: pt is seen and examined. He has no complaint and reports doing well.     Review of Systems  Objective:     Vital Signs (Most Recent):  Temp: 98.8 °F (37.1 °C) (06/19/24 1626)  Pulse: 78 (06/19/24 1626)  Resp: 18 (06/19/24 1626)  BP: (!) 162/104 (06/19/24 1626)  SpO2: 97 % (06/19/24 1626) Vital Signs (24h Range):  Temp:  [98.2 °F (36.8 °C)-98.8 °F (37.1 °C)] 98.8 °F (37.1 °C)  Pulse:  [] 78  Resp:  [12-44] 18  SpO2:  [93 %-100 %] 97 %  BP: (137-193)/() 162/104     Weight: 110.5 kg (243 lb 9.7 oz)  Body mass index is 35.97 kg/m².    Intake/Output Summary (Last 24 hours) at 6/19/2024 1714  Last data filed at 6/19/2024 0957  Gross per 24 hour   Intake 410.77 ml   Output --   Net 410.77 ml         Physical Exam  Constitutional:       General: He is not in acute distress.     Appearance: He is obese. He is not toxic-appearing.   HENT:      Head: Normocephalic.      Right Ear: External ear normal.      Left Ear: External ear normal.      Nose: No congestion or rhinorrhea.   Eyes:      General: No scleral icterus.        Right eye: No discharge.         Left eye: No discharge.      Conjunctiva/sclera: Conjunctivae normal.      Pupils: Pupils are equal, round, and reactive to light.   Cardiovascular:      Rate and Rhythm: Tachycardia present.   Pulmonary:      Effort: Pulmonary effort is normal. No respiratory distress.      Breath sounds: No wheezing or rales.   Abdominal:      General: Abdomen is flat. There is distension.      Tenderness: There is no abdominal tenderness. There is no right CVA tenderness or guarding.   Musculoskeletal:         General: No swelling.   Skin:     General: Skin is warm.   Neurological:      General: No focal deficit present.      Mental Status: He is alert and oriented to person, place, and time.   Psychiatric:         Mood and Affect: Mood normal.         Thought Content: Thought content normal.         Judgment: Judgment normal.             Significant Labs:  All pertinent labs within the past 24 hours have been reviewed.    Significant Imaging: I have reviewed all pertinent imaging results/findings within the past 24 hours.

## 2024-06-19 NOTE — ED NOTES
Report given to STACIE Adorno. All questions and concerns addressed. PO meds given, all others not available in ED. Transport requested.

## 2024-06-19 NOTE — ASSESSMENT & PLAN NOTE
Patient with new onset atrial fibrillation which is controlled currently with Amiodarone, however pt is still in sinus tach  Cardio started pt on amio drip, WEAN as tolerated  TTE tomorrow  Cardio on board

## 2024-06-19 NOTE — PLAN OF CARE
06/19/24 1209   Admission   Initial VN Admission Questions Complete   Communication Issues? None   Shift   Virtual Nurse - Rounding Complete   Pain Management Interventions care clustered;diversional activity provided;pain management plan reviewed with patient/caregiver;quiet environment facilitated;relaxation techniques promoted   Virtual Nurse - Patient Verbalized Approval Of Camera Use;VN Rounding   Type of Frequent Check   Type Patient Rounds;Telemetry Monitoring   Safety/Activity   Patient Rounds bed in low position;ID band on;bed wheels locked;visualized patient;clutter free environment maintained   Safety Promotion/Fall Prevention assistive device/personal item within reach;bed alarm set;diversional activities provided;Fall Risk reviewed with patient/family;Fall Risk signage in place;high risk medications identified;medications reviewed;nonskid shoes/socks when out of bed;room near unit station;/camera at bedside;side rails raised x 2;instructed to call staff for mobility   Safety Precautions seizure precautions maintained   Positioning   Body Position position changed independently;supine   Head of Bed (HOB) Positioning HOB elevated   Pain/Comfort/Sleep   Preferred Pain Scale number (Numeric Rating Pain Scale)   Cardiac   Cardiac/Telemetry Monitor On Yes   ECG   Rhythm normal sinus rhythm     VN cued into patient's room for introduction with patient's permission.  VN role explained and informed patient that VN would be working with bedside nurse and rest of care team.  Plan of care reviewed. Fall risk and bed alarm protocol education provided.  Instructed patient to call for assistance.  Patient aware and agreeable.  Patient's chart, labs and vital signs reviewed.  Allowed time for questions.  No acute distress noted.  Will continue to be available as needed.

## 2024-06-19 NOTE — ASSESSMENT & PLAN NOTE
Body mass index is 35.97 kg/m². Morbid obesity complicates all aspects of disease management from diagnostic modalities to treatment. Weight loss encouraged and health benefits explained to patient.

## 2024-06-19 NOTE — CONSULTS
"[facility]  Psychiatry  Telepsychiatry Consult Note    Please see previous notes:    Patient agreeable to consultation via telepsychiatry.    Tele-Consultation from Psychiatry started: 2024 at 9:38pm  The chief complaint leading to psychiatric consultation is: passive SI  This consultation was requested by Dr Eller, the Emergency Department attending physician.  The location of the consulting psychiatrist is  [place] .  The patient location is  [facility] EMERGENCY DEPARTMENT   The patient arrived at the ED at: [facility]    Also present with the patient at the time of the consultation: nobody    Patient Identification:   Silverio Devlin is a 59 y.o. male.    Patient information was obtained from patient and past medical records.  Patient presented voluntarily to the Emergency Department     Consults  Teleconsult Time Documentation  Subjective:     History of Present Illness:  60yo male presents to ED with AFIB with RVR, etoh withdrawal, and reported passive SI in the ED. JAZMINE was 411 in the ED,      Per ED Note- "   Alcohol Intoxication       Pt's wife states pt has been on alcohol binge for approx 1.5 weeks. She reports he has had approx 12 beers, a bottle of wine and vodka every day. Last binge was 5 years ago. Pt slurring words and smells of alcohol. Wife reports pt's mother  last week. No SI/HI.      Patient presents with wife.  The chief complaint is alcohol intoxication.  The wife states that he has been drinking daily for about 1-1/2 years after being sober for 6 years.  She states that on the  his mother  and this is significantly increased his alcohol consumption.  When asked about suicidal ideations he has not very forthcoming with information.  He has no other acute complaints.     The history is provided by the patient and a relative. "    On interview, I saw patient in the ED, patient reports he is feeling less jittery "it seems like the medication is bringing my blood pressure down.." " "He reports he has been drinking more than usual in context of his mother dying this past Thursday.  Patient reports "I am not suicidal.. I think I misspoke when I came in or was misinterpreted.. I was really drunk.." He went to later admit "I do rely on alcohol to cope and as a recreational activity way to much."  Denied SI or HI  No psychotic sx  Denies sustained depression or anhedonia but does reports some sadness about his mother passing  Denied manic sx  Denied reexperiencing sx  Denied obsessions  This is the extent of patients complaints at this time  12 pt ros was negative aside from sx noted above    I called patients wife for collateral at [phone]. She reports he did start drinking again several years ago and it recently accelerated in context of his mothers death. Wife reports he never made any suicidal statements and reports "he is actually afraid to die.. he would never kill himself."          Psychiatric History:   Previous Psychiatric Hospitalizations: No   Previous Medication Trials: No   Previous Suicide Attempts: no   History of Violence: no  History of Depression: no  History of Trini: no  History of Auditory/Visual Hallucination no  History of Delusions: no  Outpatient psychiatrist (current & past): No    Substance Abuse History:  Tobacco:No  Alcohol: Yes  Illicit Substances:No  Detox/Rehab: 2x > 5 years ago detox    Legal History: Past charges/incarcerations: denied    Family Psychiatric History: son-depression, etoh      Social History:  .2 sons- adults. Works in sales. Enjoys sports. Good social support. Denied access to firearms      Psychiatric Mental Status Exam:  Arousal: alert  Sensorium/Orientation: oriented to grossly intact  Behavior/Cooperation: normal, cooperative   Speech: normal tone, normal rate, normal pitch, normal volume  Language: not tested  Mood: " better "   Affect: constricted  Thought Process: circumstantial  Thought Content:   Auditory hallucinations: " NO  Visual hallucinations: NO  Paranoia: NO  Delusions:  NO  Suicidal ideation: NO  Homicidal ideation: NO  Attention/Concentration:  intact  Memory:    Recent:  Intact   Remote: Intact     Fund of Knowledge: Aware of current events   Abstract reasoning: similarities were abstract  Insight: has awareness of illness  Judgment: behavior is adequate to circumstances      Past Medical History:   Past Medical History:   Diagnosis Date    Headache     Heartburn       Laboratory Data:   Labs Reviewed   CBC W/ AUTO DIFFERENTIAL - Abnormal; Notable for the following components:       Result Value    MCH 33.9 (*)     MCHC 36.1 (*)     MPV 9.0 (*)     Immature Granulocytes 0.8 (*)     Mono % 16.3 (*)     All other components within normal limits   COMPREHENSIVE METABOLIC PANEL - Abnormal; Notable for the following components:    Sodium 132 (*)     Chloride 90 (*)     BUN 5 (*)     AST 99 (*)     ALT 78 (*)     Anion Gap 18 (*)     All other components within normal limits   ALCOHOL,MEDICAL (ETHANOL) - Abnormal; Notable for the following components:    Alcohol, Serum 411 (*)     All other components within normal limits    Narrative:     alc   critical result(s) called and verbal readback obtained from   josie hartman rn by JAI 06/17/2024 20:50   URINALYSIS, REFLEX TO URINE CULTURE - Abnormal; Notable for the following components:    Color, UA Colorless (*)     Specific Gravity, UA <1.005 (*)     All other components within normal limits    Narrative:     Specimen Source->Urine   DRUG SCREEN PANEL, URINE EMERGENCY - Abnormal; Notable for the following components:    Creatinine, Urine 12.9 (*)     All other components within normal limits    Narrative:     Specimen Source->Urine   ACETAMINOPHEN LEVEL - Abnormal; Notable for the following components:    Acetaminophen (Tylenol), Serum <3.0 (*)     All other components within normal limits   SALICYLATE LEVEL - Abnormal; Notable for the following components:    Salicylate Lvl  <5.0 (*)     All other components within normal limits   CK - Abnormal; Notable for the following components:     (*)     All other components within normal limits   ALCOHOL,MEDICAL (ETHANOL) - Abnormal; Notable for the following components:    Alcohol, Serum 258 (*)     All other components within normal limits   ALCOHOL,MEDICAL (ETHANOL) - Abnormal; Notable for the following components:    Alcohol, Serum 139 (*)     All other components within normal limits   ALCOHOL,MEDICAL (ETHANOL) - Abnormal; Notable for the following components:    Alcohol, Serum 52 (*)     All other components within normal limits   POCT GLUCOSE - Abnormal; Notable for the following components:    POCT Glucose 121 (*)     All other components within normal limits   POCT GLUCOSE - Abnormal; Notable for the following components:    POCT Glucose 125 (*)     All other components within normal limits   MAGNESIUM   LIPASE   TSH   TROPONIN I   B-TYPE NATRIURETIC PEPTIDE   TROPONIN I   TROPONIN I   POCT GLUCOSE MONITORING CONTINUOUS       Allergies:   Review of patient's allergies indicates:  No Known Allergies    Medications in ER:   Medications   enoxaparin injection 105 mg (105 mg Subcutaneous Given 6/18/24 2107)   amiodarone 360 mg/200 mL (1.8 mg/mL) infusion (0 mg/min Intravenous Stopped 6/18/24 1833)   amiodarone 360 mg/200 mL (1.8 mg/mL) infusion (0.5 mg/min Intravenous New Bag 6/18/24 1817)   losartan tablet 25 mg (25 mg Oral Given 6/18/24 1508)   pantoprazole EC tablet 40 mg (40 mg Oral Given 6/18/24 1506)   sodium chloride 0.9% flush 10 mL (has no administration in time range)   naloxone 0.4 mg/mL injection 0.02 mg (has no administration in time range)   glucose chewable tablet 16 g (has no administration in time range)   glucose chewable tablet 24 g (has no administration in time range)   glucagon (human recombinant) injection 1 mg (has no administration in time range)   dextrose 10% bolus 125 mL 125 mL (has no administration in  time range)   dextrose 10% bolus 250 mL 250 mL (has no administration in time range)   thiamine (B-1) 500 mg in dextrose 5 % (D5W) 100 mL IVPB (500 mg Intravenous New Bag 6/18/24 2107)   thiamine (B-1) 250 mg in dextrose 5 % (D5W) 100 mL IVPB (has no administration in time range)   folic acid tablet 1 mg (1 mg Oral Given 6/18/24 1612)   diazePAM injection 5 mg (5 mg Intravenous Given 6/18/24 1756)   gabapentin capsule 100 mg (100 mg Oral Given 6/18/24 2107)   lactated ringers infusion (0 mLs Intravenous Stopped 6/17/24 2158)   aluminum-magnesium hydroxide-simethicone 200-200-20 mg/5 mL suspension 30 mL (30 mLs Oral Given 6/17/24 2243)     And   LIDOcaine viscous HCl 2% oral solution 15 mL (15 mLs Oral Given 6/17/24 2243)   lactated ringers infusion (0 mLs Intravenous Stopped 6/18/24 0547)   lactated ringers bolus 1,000 mL (0 mLs Intravenous Stopped 6/18/24 0748)   LORazepam injection 2 mg (2 mg Intravenous Given 6/18/24 0648)   LORazepam injection 2 mg (2 mg Intravenous Given 6/18/24 1012)   diltiaZEM injection 10 mg (10 mg Intravenous Given 6/18/24 1012)   diltiaZEM injection 10 mg (10 mg Intravenous Given 6/18/24 1028)   diltiaZEM 125 mg in D5W 125 mL infusion (0 mg/hr Intravenous Stopped 6/18/24 1229)   amiodarone in dextrose 150 mg/100 mL (1.5 mg/mL) loading dose 150 mg (0 mg Intravenous Stopped 6/18/24 1237)   adenosine injection 6 mg (6 mg Intravenous Given 6/18/24 1200)   diazePAM tablet 5 mg (5 mg Oral Given 6/18/24 1622)       Medications at home: reviewed with patient and in MAR    No new subjective & objective note has been filed under this hospital service since the last note was generated.      Assessment - Diagnosis - Goals:     Diagnosis/Impression:   Alcohol withdrawal  Alcohol use disorder    Modified SAD PERSONS Scale     Suicide Risk Assessment (if applicable)-  A: Access to lethal means ? no  Risk Factors:  S: Male sex ? 1  A: Age 15-25 or 59+ years ?1  D: Depression or hopelessness ?0  P:  Previous suicidal attempts or psychiatric care ?1   E: Excessive ethanol or drug use ? 1  R: Rational thinking loss (psychotic or organic illness) ?possible  S: Single,  or  ?0  O: Organized or serious attempt ? 0  N: No social support ? 0  S: Stated future intent (determined to repeat or ambivalent) ? 0  Protective Factors:  C: Contracts for safety ? yes  H: Hopeful for the future ? yes  O: Oriented to the future ? yes   I:  Intent- denies ?yes has protective factor  R: Reasons not to attempt (namely, impact on loved ones and Druze) ?family    Rec:   Continue Valium 5mg IV q 4 hours prn etoh withdrawal si/sx  Continue CIWA  Thiamine/nutritional supplementation  Start naltrexone 50mg tab targeting alcohol use disorder. Start 1/2 tab po daily for two days then 1 tab po daily thereafter taken with food. Check set of liver enzymes 1-2 weeks after starting to ensure it does not lead to transaminitis. Do not co-administer opioids with this medication. Patient confirmed to writer as did his wife that he is not taking any opioids in the outpatient setting.  Recommend inpatient substance rehab post hospital stay however patient did not seem amenable. Therefore please provide him with area addiction resources. I also recommend outpatient psychiatric follow up  Psychoeducation provided regarding substance use    At this time based on data that was available to me at the time of consultation, I believe that with reasonable medical certainty that patient does not appear to be a PEC candidate. Patient does not appear to have SI/HI reported to writer or is gravely disabled. Patient does not want to pursue voluntary psychiatric hospitalization at this time after informed consent provided.  Patient contracts for safety and agreed to return to the ED should he develop any SI or HI.     Time with patient, coordinating care: 51min      More than 50% of the time was spent counseling/coordinating care    Consulting  team nurse was informed of the encounter and consult note.    Consultation ended: 6/18/2024 at 10:32pm    Galen Fish MD  Psychiatry  [facility]

## 2024-06-19 NOTE — ED NOTES
Consulted with cardiology about amiodarone drip per Attending request. Cardiology would like to continue the drip per protocol. Drip running at 0.5 mg/min.

## 2024-06-19 NOTE — NURSING
Rapid Response Nurse Follow-up Note     Followed up with patient for proactive rounding.   Chart review completed including VS, telemetry, notes, orders, and labs.    Amiodarone drip infusing. Telemetry reviewed---NSR rate 80-90s.   Last CIWA score of 1. Patient last received PRN Valium this AM. Patient reports feeling anxious. Discussed with Dr. Samantha VALENCIA to review orders.   Reviewed plan of care with Bedside RNTila .     Team will continue to follow.  Please call Rapid Response RNSindy RN with any questions or concerns at [phone].

## 2024-06-19 NOTE — DISCHARGE INSTRUCTIONS
REFERRAL RECOMMENDATIONS FOR SUBSTANCE ABUSE & MENTAL HEALTH      IN CASE OF SUICIDAL THINKING, call the National Suicide Hotline Number: 988    988 Suicide & Crisis Lifeline: 989 , 7-934-147-JMTI (8317)  https://988Mofang.Ma[employer] Market       SUBSTANCE ABUSE:     [facility] (formerly known as the ABU)  [x] [phone], Option 2  [x] [address] [facility] 4[facility], [place] [address]  [x] [email or web address]://www.[email or web address].org/services/[email or web address]-recovery-program  [x] [facility] delivers comprehensive and collaborative treatment for alcohol and substance use disorders.  Excellent program for working professionals or anyone else seeking recovery.  [x] Requires insurance approval prior to starting program, call number above for more information.  [x] Intensive Outpatient Rehabilitation Program - M-F 9am-3pm - daily groups with psychologists and social workers, sessions with MDs 3x per week   [x] Ambulatory detox and dual diagnosis available      SUBOXONE:     NOTE: some Suboxone clinics require their clients to participate in a structured program (such as an IOP) in order to be prescribed Suboxone.  Some clinics have a long waiting list.  Most of these clinics do not accept walk-in clients, so call first to to learn what must be done to get started on Suboxone.    [facility] - [phone] (can do Sublocade)  [address] [facility], [place] [address]    [address]  [phone]    [facility] - [phone] (can do Sublocade)  [address] [place] [address]    [facility]  [address] [place]  [phone]     [facility] (very short waiting list, may accept some walk-in's but call first if possible)  [address], [place] [address]  [phone]; [phone]    [facility]   [address] [place]    [phone]    [facility] (can usually be seen within a week but is cash only  for appointment)  [address] [facility])  [address] [place], [place]  [phone]    [facility] ([facility])  [address] [place] [address]  [phone]    [place], [place]:    [facility] - [address] [place] [address] - Tel: [phone]    David Kentrell - [address] [place] [address] - Tel: [phone]    Abe Ramirez - [address] [place] [place] [address] - Tel: [phone]    Logan Carmichael - [address] [place] [address] - Tel: [phone]    Tylor Navarrete - [address] [place] [address] - Tel: [phone]    [place], [place]:     Dr. Florian Mireles and Dr. Jose Burton - [address] [place], [place] - Tel: [phone]    Dr. Ritika Osborn - [address] [facility] [place], [place] - Tel: [phone]    Dr. Aron Corado - Tel: [phone]    Dr. José Miguel Arshad - [facility] - [phone]      METHADONE:     [facility] (the only methadone clinic in the [facility], has two locations)  [x] [place] - [address] [place] [address], [phone]  [x] [facility] [place] [address], [phone]      12 STEP PROGRAMS (and similar):     Alcoholics Anonymous (local)  [x] [phone]  [x] www.[place].org for schedules for in-person and online meetings  [x] There are AA meetings throughout the day all over [facility]  [x] AA costs nothing to attend; they pass a basket for donations but this is not required    Narcotics Anonymous  [x] [phone]  [x] www.noana.org  [x] There are NA meetings throughout the day all over town  [x] NA costs nothing to attend; they pass a basket for donations but this is not required    Alcoholics Anonymous Online Intergroup (national)  [x] www.aa-intergroup.org  [x] Good resource for large, nation-wide meetings  [x] Can also attend smaller, local meetings in other cities  [x] Countless meetings all day and all night  [x] AA costs nothing to attend; they pass a basket for donations  but this is not required    Flying Sober - 24/7 zoom meetings for women and coed - sign on anytime, anywhere!  https://flyingTaskhero.comsober.Easy Vino/75-9-cpygdigy/    Online Intergroup of AA - 121 Open AA [place] Meeting - 24/7 zoom meetings  https://aa-intergroup.org/meetings/    LOOKING FOR AN ALTERNATIVE TO 12 STEP PROGRAMS - check out:  SMART Recovery: https://www.smartrecovery.org/about-us  Manny Recovery: https://recoverydharma.org      DETOX UNITS (USUALLY 5-7 DAYS):     [facility]: [address], [place]  [phone], call first to ensure bed availability    [facility]: [address] [facility] [address] [facility]  [phone], Option 1, call first to ensure bed availability    [facility]: [facility] Dave Ke[address], [facility]  [phone] (intake by appointment only)    [facility]: [address], [facility]  [phone]      INTENSIVE OUTPATIENT PROGRAMS:     [facility] (formerly known as the ABU)  [x] [phone], Option 2  [x] [address] [facility] 4th Floor, [facility] [address]  [x] https://www.[facility].org/services/[facility]-recovery-program  [x] [facility] delivers comprehensive and collaborative treatment for alcohol and substance use disorders.  Excellent program for working professionals or anyone else seeking recovery.  [x] Requires insurance approval prior to starting program, call number above for more information.  [x] Intensive Outpatient Rehabilitation Program - M-F 9am-3pm - daily groups with psychologists and social workers, sessions with MDs 3x per week   [x] Ambulatory detox and dual diagnosis available    [facility] Intensive Outpatient Program  [x] [phone]  [x] [address] [facility] (the clinic not on [facility])  [x] Call number above for more info and to check insurance requirements    [facility]  [address] [place] [address] [phone]    [facility]:  [address] [facility]  [address]?, [place], [place] [address]?, [phone]  [address] [facility]?, [place], [place] [address]?, [phone]    RESIDENTIAL REHABS (USUALLY 28 DAYS):     [facility]: [address], [phone]    [facility]: [address] , [facility]  [phone] (intake by appointment only)    [facility] (men only) [address], [place], [phone]    [facility] (Female only) [address] [place], [phone]    [facility]: [address], [place], men's program [phone], women's program [phone]    [facility]: [address], [place], [phone]    [facility]: [address], [place], [place], [phone]    [facility]: Men only, [phone], [address] [facility] Eladio Craft [facility]: [address], [place], [place], [phone]    [facility]: [facility] [place], [place],  [phone]  New Location: [address] [place] [address] [address], [place] [address], [phone]    [facility]:   ?[address] Pending sale to [facility]. 36?[place], [place] [address]?[phone]    [facility]: [address] [place] [address], [phone]    [place]: [place], [place], [phone]     [facility]: [place], [phone]    [facility]: [address] [place], [phone]    [facility]: [place], [place], [phone]    [place]: [address] [place], [phone]    [facility]: [address] [facility], [place] [address], [phone]    COMMUNITY ADDICTION CLINICS:     [facility]: [address] [facility], [address] [place] -[phone] -or- [address] [place] [address]    [facility] [place]: [address] [place]  [address]     [facility]: Clinics [phone]; Crisis [phone]    [facility]: [address] [place] [address]    [facility]: [address] [place] [address]    [facility]: [address] [place] [address],    [facility]: 2nd Floor [address] [place] [address]    [facility]: [address], [place] [address]    [facility], [address], [place] [address]    [facility]: [address] [facility], [place] [phone]  (serves youth 16-23 years old)    [facility] Center: [facility]/[place] [phone]    [facility]: [address] [facility], [place] [phone]    [facility]: [address] [facility] [phone]    [facility]: [address] [place], [place] [address]10 [facility], [address], [phone]     [facility]: [address] [facility], [phone], [phone]    RESOURCES IN OTHER [facility]:     [facility]: [address] [facility], [phone], [phone]    [facility]: [address] [facility], [address], [phone]    [facility], [address] [place] [address], [phone]    [facility]: [address] [facility], [phone]    [facility], [address] [facility], [phone] ([facility])    [facility], [address] [facility], [phone] ([facility])    [facility] Health, [address], [place], [phone] ([facility], and [facility])    [facility], [address] , [place], [phone] ([facility])    [facility] Hotline [phone], [phone]    [facility], [address] [facility], [facility] [place], [address] [facility], [address], [place] [facility], [address] [facility]  "Eddie Laplace [facility], 500 [facility]. [address] [place] [facility], [address], [place]    [facility] Human [facility], [address] [facility], [address] [phone]    [facility], [address] [facility] [phone]    [facility], [address] [facility] [phone]    [facility], [address] [facility], [phone]      Lithuanian SPEAKING (en español):     Información de la reunión de Alcohólicos Anónimos  [facility], 10:00 am  Habla español  Esta reunión está abierta y cualquiera puede asistir.    German speaking Alcoholics anonymous meetings:  El "Yobany [place] AA Skype" es un yobany on line de Alcohólicos Anónimos en [facility]. El yobany es rishi, gratuito y virtual a través de Skype Audio. El yobany funciona mediante violet llamada grupal de voz, por lo que no se utiliza la videollamada, ni se pueden dio las imágenes o rostros de los participantes. Hace padmini años y medio abrimos el primer Yobany de AA por Skype en [place], calli actualmente asisten personas desde [place], [place], [place], [place], [place], [place], [place], [place], [place], [place], [place] y [place], entre otros.    El yobany es muy útil para los alcohólicos que residen en lugares donde no se celebran reuniones de AA, o residen en lugares donde las reuniones de AA son un número limitado de días a la semana, o para aquellos compañeros que se hayan de viaje o que, por cualquier motivo, se hayan convalecientes y no pueden desplazarse. Todos los días nos reuniones a las 21:00 (hora española)    Podéis obtener más información sobre el yobany y rigoberto sesiones en la página web [email or web address]      MENTAL HEALTH:     [facility] - Outpatient Clinic  [phone]    [facility] - General " Psychiatry Intensive Outpatient Program  [facility] (formerly known as the BMU)  [phone], option 3    [facility]  Department of Psychiatry - Dual Diagnosis Intensive Outpatient Program  [facility] (formerly known as the ABU)  [phone], option 2      COMMUNITY MENTAL HEALTH CENTERS:     [facility]  (aka [facility])  Serves [facility].  Serves uninsured patients & those with Medicaid.  Main location: [address] [place] [id number] [phone]  Walk-in's available during regular business hours.  24/7 Crisis Line: [phone]    [facility]  (aka [facility])  Serves [facility].  Serves uninsured patients, those with Medicaid and some private plans.  Walk-in's available during regular business hours.  Primary care services available as well.  [facility]: [address] [facility] [place] [address];  [phone]  [place]: [address] [place] [address];  [phone]  24/7 Crisis Line: [phone]    [facility]  Serves uninsured patients & those with Medicaid, call for more info.  Primary care, pediatrics, HIV treatment, and dentistry services available as well.  Three locations.  [phone]    [facility]?Corporate Office  Serves patients with Medicaid, Medicare, and private insurance  [address]  [place],?[place] [address] [phone]    [facility]  Serves uninsured on a sliding scale, as well as Medicaid, Medicare, and private plans.  Eight locations around the [facility] area.  [phone]    [facility]  Serves uninsured patients & those with Medicaid, private insurances.  Primary care available as well.  [phone]  [address] [place]  [address]    [facility]  Serves veterans who were honorably discharged.  [address] [place] [address]  [phone]  24/7 Veterans Crisis Line: [phone] (Press 1)    If you have private insurance and need to find a specialist, please contact your insurance network to request a list of providers covered by your benefits.      MENTAL HEALTH/ADDICTIVE DISORDERS:     AA ([phone]), NA ([phone])   National Suicide Prevention Lifeline- Call 1-459.561.2960 Available 24 hours everyday  [facility] [phone]; Crisis Line 754-7996 - Call for options A-F:  Intensive Outpatient Treatment/ Day programs   [facility], please contact   [facility], please contact [phone] or [phone] to speak with an admissions counselor.  [facility] (Methadone Maintenance)   [address] [place] [address], [phone]  [address] [place] [address] [phone]  [facility], [address] [place] [address], [phone]  [facility] [phone]  [place] [facility] please contact [phone]  [facility], [address] [facility], 4th [place] [address] Phone: [phone]   Acer  [place] Office: [place] [address], [phone]  [place] Office: [address] [facility], [address], [place] [address], [phone]  [place] Office: [address] [place] [place] [address] [phone]    Outpatient Substance Abuse Treatment   [facility] (Methadone Maintenance)   [address] [place] [address], [phone]  [address] [place] [address] [phone]  [facility], [address] [place] [address], [phone]  Acer  [place] Office: [address], [place] [address], [phone]  [place] Office: [address] [facility] [address], [place] [address],  [phone]  [place] Office: [address] [place] [address] [phone]  [facility], [address] [place] [address] [phone]   [facility], [address] [facility], [address], [phone]  [facility]over, [address] [place], [place] [phone]    Residential Substance Abuse Treatment   [facility] [address] [facility], [phone]  [facility], [address]150 [facility], [phone]  [facility] (men only) [address] [place] [address], [phone]  [facility] (women only) [address] [place] [address] [phone]  [facility], [address] [place] [address] [phone]  [facility] (women only), intakes at 4[address] [facility], [phone]  [facility] (7-day program, $100, [address], [phone], [phone], [phone])  [facility] (Men only, [phone]), [address], Eladio (Vets*/Non-Vets)  [facility] (Men only, $400/month program fee) [address] [facility], [phone]  [facility] (Women over age 39 only), [address] [facility], [phone]    Out of Area:    [place], [address] [facility] 36, [place], [place] [phone])  [facility] (men only), [address] [facility] [place] [address], [phone]  [facility], [address] [place], [place] ([phone])  [place], [address] [place] [address] Dr. Bosch, [place] ([phone])  [facility] Addiction [facility], [address] [facility], [phone]  [facility] (Women only, has to have mental illness, can be homeless or substance abuser), [phone]        DOMESTIC VIOLENCE RESOURCES:     Advocacy  [facility] ([facility])  [address] [address]    [facility] ? [phone]  Services provided: emergency shelter, individual advocacy,  information and referrals, group support, children's program, medical advocacy, forensic medical exams, primary care, legal assistance, counseling, safety planning, and caregiver support    [facility] HEALING AND EMPOWERMENT [place]  Confidential location  [facility] ? [phone]  Services provided: short term emergency shelter, all services provided are free of charge    [facility] CENTERS FOR COMMUNITY ADVOCACY  Multiple locations in [facility], [facility], [place], and [facility] ([place], [place], and [place])    [place] ? [phone]  Services provided: emergency shelter, individual advocacy, information and referrals, group support, children's program, medical advocacy, legal assistance in obtaining restraining orders, counseling, safety planning, and caregiver support    [facility]   Emergency Shelter   [phone]  Emergency Services ,Legal and Financial Assistance Services ,Housing Services ,Support Services     [place] [facility]   [phone]  Emergency Services ,Counseling Services , Housing Services ,Support Services ,Children's Services     WOMEN WITH A VISION  [address] [place] [address]  WWAV ? [phone]  Services provided: advocacy, health education and supportive services, specializing in free healing services for marginalized groups, including LGBTQ individuals and sex workers    SEXUAL TRAUMA AWARENESS AND RESPONSE (STAR)  [address] [place] [address]    STAR ? [phone]  Services provided: individual advocacy, information and referrals, group support, medical advocacy, legal assistance, counseling, and safety planning for survivors of sexual assault    [facility])  [address] [place] [address]  [facility] Forensic Program ? [phone]  Services provided: free forensic medical exams for sexual assault and domestic violence, which can be performed up to 5 days  after an incident. It is not necessary to make a police report to receive a forensic medical exam    Legal  [facility]  [address], [place] [address]  Project SAVE ? [phone]  Services provided: free emergency legal representation for survivors of doemstic violence residing in [facility]. Legal services may include temporary restraining orders, temporary child support, custody, and use of property    [facility] LEGAL SERVICES (SLLS)  [address] [facility], [place] [address]  SLLS ? [phone]  Services provided: free legal representation for survivors of domestic violence residing in [facility]. Legal services may include temporary child support, custody, and divorce      HOTLINES:     [facility] DOMESTIC VIOLENCE HOTLINE  [phone]    Services provided: free and confidential hotline for victims and survivors of domestic violence. All calls will be routed to a domestic violence service provided in the victim or survivor's area    NATIONAL HUMAN TRAFFICKING HOTLINE  [phone]    Services provided: national anti-trafficking hotline serving victims and survivors of human trafficking. Provides information about local resources, and access to safe space to report tips, seek services, and ask for help    VIA LINK  211 or [phone]    Service provided: counselors can provide crisis counseling. Counselors can also provide information and referrals to programs which can help with needs such as food, shelter, medical care, financial assistance, mental health services, substance abuse treatment, senior services, childcare, and more      HOMELESS SHELTERS:      Homeless shelters  [facility]  Emergency shelter for individuals and families  [address]  [phone]  [facility]  Emergency shelter for men only  Meals daily 6am, 2pm, & 6pm  Clothing, case management M-F by appointment (ID/job/housing/legal assistance), mail  [address]    [phone]  [facility]  Emergency shelter for men  [address] [facility]  [phone]  Emergency shelter for women  [address]   [phone]  Breakfast & lunch daily, dinner M-F  Case management, job counseling services   [facility] [place]  Emergency shelter for teens and young adults up to 22yo  [address]   [phone]  [facility]  Emergency shelter for women over 17yo and their kids  [address] [place] [address]  [phone]  [facility]  Day program, meals M-F 1PM (arrive early)  Showers, laundry, hygiene kits, showers, phones, , notary services, case management, ID assistance  [address]   [phone] M-F 8am-2:30pm  [employer]  Day program  MTW 7:30am-3:30pm,  8:30am-3:30pm  Crisis intervention, employment assistance, food/clothing, hygiene kits, bus tokens, mail  [address] [facility] [address]  [phone]  [facility]  Mobile outreach for homeless persons in [facility]  [phone]  [facility] for the [facility]eless  Primary healthcare, case management, dental services, TB placement  Call ahead  [address] [facility]  [phone]  [facility] at the [facility]  Connects homeless people with their loved ones in other cities by providing transportation costs   [phone]      [place] RESOURCES:     [place] Mobile Mental Health Crisis Response Team:    Region 12 ([place], [place], [place], and [facility]) ([facility] and [facility])  [phone]      Outpatient Mental Health & Addiction Clinic Resources for both [facility] and [facility]:    [facility] Mental Healthcare Resources  Website: www.mhr.org  Main Number: [phone]    [facility] ([facility])  [address] ([address] [place] [address]  [phone]    [facility] ([facility])  [address] ([address]) [facility]  [place] [address]  [phone]    [facility]  [address] ([address]) [place] [address]  [phone]    [facility]  [address] ([address] [place] [address]) [place] [address]  [phone]      Addiction Treatment Resources for both [facility] and [facility]:    [facility] (Detox, Residential, PHP, IOP, and Aftercare Programs)  [address] [address]  [phone]    [facility] (Residential, IOP, Transitional Living, and Aftercare Programs)  [address] [facility] [place] [address]  [phone]    [facility] (Inpatient, Residential, Detox, IOP, Outpatient, and Aftercare Programs)  [address] [place] [address] [place] [address]  [phone] or toll free at [phone]      Outpatient Mental Health Psychotherapy Resources for both [facility] and [facility]:    Laurie Sylvester, [facility] [place]  [address]  [place] [address]  [phone]  Specialties: Depression, Anxiety, and Life Transitions    Geno Kramer, PhD  [address] [place] [address] [address]  [address]  [place] [address]  [phone]  Specialties: Testing and Evaluation, Education and Learning Disabilities, and ADHD    Ilda Perry, [facility] [address] [place] [address], [place] [address]  [phone]  Specialties: Obsessive-Compulsive (OCD), Depression, and Relationship Issues    Josee Castro LPC [address] [facility] [place] [address]  [phone]  Specialties: Trauma & PTSD, Mood Disorders, and Anxiety    Josee Nevarez, PhD, [facility]  [facility] [address] [facility] [place] [address]  [phone]  Specialties: Family Conflict, Child, and Relationship Issues    Leydi Mendosa, [facility] [place] [address] [phone]  Specialties: Anxiety, Depression, and Anger Management        IN CASE OF SUICIDAL THINKING,  call the National Suicide Hotline Number: 988    988 Suicide & Crisis Lifeline: 988 , 6-568-773-TALK 8255)  Provides 24/7, free and confidential support for people in distress, prevention and crisis resources for you or your loved ones, and best practices for professionals.    Call, text or chat.  https://988Enverv.org

## 2024-06-19 NOTE — HOSPITAL COURSE
6/18: admitted for alcohol intoxication, vitals not stable, Elevated CIWA score, CIWA protocol followed. Transfer to ICU  6/19: Downgraded to tele. Continue alcohol withdrawal treatment while closely monitoring vitals. Still on amio drip and cardio on board.  6/20: doing well. Off amio, transitioned to BB, no more withdrawal symptoms. He will be d/c on 2 more doses of diazepam. One tonight at 8pm and last one tomorrow at 8pm. He should receive a dose prior to leaving. He is advised to f/u with cardio in 2 week. Out pt appointment is being set up. He would be provided resources for Alcohol abuse. Pt verbalized understanding

## 2024-06-19 NOTE — ED NOTES
After talking with tele psych pt wanted to experience to nurse that he is not suicidal and he just wants to get back home to his grand kids.

## 2024-06-19 NOTE — SUBJECTIVE & OBJECTIVE
Past Medical History:   Diagnosis Date    Headache     Heartburn        Past Surgical History:   Procedure Laterality Date    COLONOSCOPY N/A 2/14/2024    Procedure: COLONOSCOPY;  Surgeon: Kvng Jay MD;  Location: [facility] ENDOSCOPY;  Service: Endoscopy;  Laterality: N/A;  Referral: Lloyd Armando MD / Roberth / Nita portal / LW  2/7- pc complete. DBM  2-12 pre call complete [facility]    ESOPHAGOGASTRODUODENOSCOPY N/A 2/14/2024    Procedure: EGD (ESOPHAGOGASTRODUODENOSCOPY);  Surgeon: Kvng Jay MD;  Location: [facility] ENDOSCOPY;  Service: Endoscopy;  Laterality: N/A;    NASAL SEPTOPLASTY N/A 11/7/2022    Procedure: SEPTOPLASTY, NOSE;  Surgeon: Gwyn Mendez MD;  Location: [facility] OR;  Service: ENT;  Laterality: N/A;    NASAL STENOSIS REPAIR N/A 11/7/2022    Procedure: REPAIR, STENOSIS, NOSE, VESTIBULE;  Surgeon: Gwyn Mendez MD;  Location: [facility] OR;  Service: ENT;  Laterality: N/A;  3.5 HRS       Review of patient's allergies indicates:  No Known Allergies    No current facility-administered medications on file prior to encounter.     Current Outpatient Medications on File Prior to Encounter   Medication Sig    aspirin 325 MG tablet Take 325 mg by mouth once daily.    losartan (COZAAR) 25 MG tablet Take 1 tablet (25 mg total) by mouth once daily.    omeprazole (PRILOSEC) 20 MG capsule Take 1 capsule (20 mg total) by mouth once daily.    azelastine (ASTELIN) 137 mcg (0.1 %) nasal spray 1 spray (137 mcg total) by Nasal route 2 (two) times daily.    hydrocortisone (ANUSOL-HC) 2.5 % rectal cream Place rectally 2 (two) times daily.    ibuprofen (ADVIL,MOTRIN) 100 MG tablet Take 100 mg by mouth every 6 (six) hours as needed for Temperature greater than.     Family History       Problem Relation (Age of Onset)    Dementia Mother    LUIS disease Father    Guillain-[place]phyrhills syndrome Brother    Hypertension Father, Brother    Thyroid disease Father          Tobacco Use    Smoking status: Some Days     Passive exposure: Never     Smokeless tobacco: Never    Tobacco comments:     1 cig a day x 30 years    Substance and Sexual Activity    Alcohol use: Not Currently    Drug use: Yes     Types: Marijuana     Comment: rare    Sexual activity: Not on file     Review of Systems   Unable to perform ROS: Mental status change     Objective:     Vital Signs (Most Recent):  Temp: 98.4 °F (36.9 °C) (06/18/24 1147)  Pulse: 78 (06/19/24 0809)  Resp: 19 (06/19/24 0809)  BP: (!) 162/107 (06/19/24 0732)  SpO2: 100 % (06/19/24 0809) Vital Signs (24h Range):  Temp:  [98.4 °F (36.9 °C)] 98.4 °F (36.9 °C)  Pulse:  [] 78  Resp:  [15-44] 19  SpO2:  [93 %-100 %] 100 %  BP: (115-180)/() 162/107     Weight: 111.1 kg (245 lb)  Body mass index is 36.18 kg/m².    SpO2: 100 %         Intake/Output Summary (Last 24 hours) at 6/19/2024 0931  Last data filed at 6/18/2024 2224  Gross per 24 hour   Intake 388.94 ml   Output --   Net 388.94 ml       Lines/Drains/Airways       Peripheral Intravenous Line  Duration                  Peripheral IV - Single Lumen 06/17/24 1959 20 G Anterior;Distal;Right Upper Arm 1 day         Peripheral IV - Single Lumen 06/18/24 1230 20 G Left Antecubital <1 day                     Physical Exam  Constitutional:       General: He is not in acute distress.     Appearance: He is not diaphoretic.   HENT:      Head: Atraumatic.   Eyes:      General:         Right eye: No discharge.         Left eye: No discharge.   Cardiovascular:      Rate and Rhythm: Normal rate and regular rhythm.   Pulmonary:      Effort: Pulmonary effort is normal.      Breath sounds: No rales.   Abdominal:      General: Bowel sounds are normal.      Palpations: Abdomen is soft.   Skin:     General: Skin is warm and dry.          Significant Labs: BMP:   Recent Labs   Lab 06/17/24 1959   GLU 97   *   K 4.7   CL 90*   CO2 24   BUN 5*   CREATININE 0.8   CALCIUM 8.9   MG 2.2   , CMP   Recent Labs   Lab 06/17/24 1959   *   K 4.7   CL 90*   CO2 24   GLU 97  "  BUN 5*   CREATININE 0.8   CALCIUM 8.9   PROT 7.9   ALBUMIN 4.3   BILITOT 0.4   ALKPHOS 86   AST 99*   ALT 78*   ANIONGAP 18*   , CBC   Recent Labs   Lab 06/17/24 1959   WBC 5.02   HGB 17.2   HCT 47.6      , INR No results for input(s): "INR", "PROTIME" in the last 48 hours., Lipid Panel No results for input(s): "CHOL", "HDL", "LDLCALC", "TRIG", "CHOLHDL" in the last 48 hours., Troponin   Recent Labs   Lab 06/17/24  2243 06/18/24  1000   TROPONINI 0.015 <0.006   , and All pertinent lab results from the last 24 hours have been reviewed.    Significant Imaging: Echocardiogram: Transthoracic echo (TTE) complete (Cupid Only): No results found for this or any previous visit.  "

## 2024-06-19 NOTE — PROGRESS NOTES
[facility] Medicine  Progress Note    Patient Name: Silverio Devlin  MRN: [id number]  Patient Class: IP- Inpatient   Admission Date: 6/17/2024  Length of Stay: 1 days  Attending Physician: Taryn Eller MD  Primary Care Provider: Lloyd Armando MD        Subjective:     Principal Problem:Atrial fibrillation with RVR        HPI:  Pt is a 58y/o male with medical hx of anxiety to presented to the ED accompanied by wife with complaint of excessive alcohol intake and not feeling well. Per pt, he recently lost his mother and resulted to drinking alcohol to help with his stress. He does have a hx of excessive alcohol intake. He reports drinking 11 cans of beers and 2 bottle of wine, his wife reports seeing a big bottle of vodka. His last alcohol intake was yesterday. In the ED vitals showed elevated HR, EKG was concerning for afib, this is new onset. Lbas showed the following: cbc normal, cmp showed elevated liver enzymes, cpk, alcohol level were all elevated. Imaging showed the following, CT head: no acute intracranial abnormality, CXR : no cardiopulmonary dz, CT cervical: no acute intracranial abmormality. Pt will be admitted for further management of care.    Overview/Hospital Course:  6/18: admitted for alcohol intoxication, vitals not stable, Elevated CIWA score, CIWA protocol followed. Transfer to ICU  6/19: Downgraded to tele. Continue alcohol withdrawal treatment while closely monitoring vitals. Still on amio drip and cardio on board.    Interval History: pt is seen and examined. He has no complaint and reports doing well.     Review of Systems  Objective:     Vital Signs (Most Recent):  Temp: 98.8 °F (37.1 °C) (06/19/24 1626)  Pulse: 78 (06/19/24 1626)  Resp: 18 (06/19/24 1626)  BP: (!) 162/104 (06/19/24 1626)  SpO2: 97 % (06/19/24 1626) Vital Signs (24h Range):  Temp:  [98.2 °F (36.8 °C)-98.8 °F (37.1 °C)] 98.8 °F (37.1 °C)  Pulse:  [] 78  Resp:  [12-44] 18  SpO2:  [93 %-100 %] 97  %  BP: (137-193)/() 162/104     Weight: 110.5 kg (243 lb 9.7 oz)  Body mass index is 35.97 kg/m².    Intake/Output Summary (Last 24 hours) at 6/19/2024 1714  Last data filed at 6/19/2024 0957  Gross per 24 hour   Intake 410.77 ml   Output --   Net 410.77 ml         Physical Exam  Constitutional:       General: He is not in acute distress.     Appearance: He is obese. He is not toxic-appearing.   HENT:      Head: Normocephalic.      Right Ear: External ear normal.      Left Ear: External ear normal.      Nose: No congestion or rhinorrhea.   Eyes:      General: No scleral icterus.        Right eye: No discharge.         Left eye: No discharge.      Conjunctiva/sclera: Conjunctivae normal.      Pupils: Pupils are equal, round, and reactive to light.   Cardiovascular:      Rate and Rhythm: Tachycardia present.   Pulmonary:      Effort: Pulmonary effort is normal. No respiratory distress.      Breath sounds: No wheezing or rales.   Abdominal:      General: Abdomen is flat. There is distension.      Tenderness: There is no abdominal tenderness. There is no right CVA tenderness or guarding.   Musculoskeletal:         General: No swelling.   Skin:     General: Skin is warm.   Neurological:      General: No focal deficit present.      Mental Status: He is alert and oriented to person, place, and time.   Psychiatric:         Mood and Affect: Mood normal.         Thought Content: Thought content normal.         Judgment: Judgment normal.             Significant Labs: All pertinent labs within the past 24 hours have been reviewed.    Significant Imaging: I have reviewed all pertinent imaging results/findings within the past 24 hours.    Assessment/Plan:      * Atrial fibrillation with RVR  Patient with new onset atrial fibrillation which is controlled currently with Amiodarone, however pt is still in sinus tach  Cardio started pt on amio drip, WEAN as tolerated  TTE tomorrow  Cardio on board      Elevated liver  enzymes  Secondary to excess alcohol intake  Trend LFT        Alcohol withdrawal syndrome without complication  Binge drinking  Importance of alcohol cessation discussed at length  Admit to ICU as vitals are very unstable  Iv fluid  CIWA score 17  Start diazepam 2 to 5mg IV q 5 mins  X3, then  4 to 10mg  IV x3 until CIWA score is less than 98 or RASS 0to -1  THIAMINE  FOLIC ACID  GABAPENTIN    IF controlled vs not, notify physician  SW consult     6/19/24:  Diazepam 5mg Q hrs today and gabapentin 100mg Q8hrs today  Continue IV fluid  Monitor vital closely          Sinus tachycardia  Could be 2/2 to alcohol withdrawal  On amio drip wean as tolerated   TTE tomorrow  Cardio on board, pls follow recs      Severe obesity (BMI 35.0-39.9) with comorbidity  Body mass index is 35.97 kg/m². Morbid obesity complicates all aspects of disease management from diagnostic modalities to treatment. Weight loss encouraged and health benefits explained to patient.           VTE Risk Mitigation (From admission, onward)           Ordered     enoxaparin injection 105 mg  Every 12 hours         06/18/24 1154                    Discharge Planning   LUBNA:      Code Status: Full Code   Is the patient medically ready for discharge?:     Reason for patient still in hospital (select all that apply): Treatment                     Taryn Eller MD  Department of Hospital Medicine   [facility]

## 2024-06-19 NOTE — ED NOTES
Pt laying in bed resting with eyes closed. Rise and fall of chest noted. Easy to arouse. Bed locked and in the lowest position, side rails up x2, call bell in reach.

## 2024-06-20 ENCOUNTER — TELEPHONE (OUTPATIENT)
Dept: CARDIOLOGY | Facility: CLINIC | Age: 59
End: 2024-06-20
Payer: COMMERCIAL

## 2024-06-20 ENCOUNTER — TELEPHONE (OUTPATIENT)
Dept: INTERNAL MEDICINE | Facility: CLINIC | Age: 59
End: 2024-06-20
Payer: COMMERCIAL

## 2024-06-20 VITALS
DIASTOLIC BLOOD PRESSURE: 93 MMHG | BODY MASS INDEX: 36.08 KG/M2 | RESPIRATION RATE: 18 BRPM | SYSTOLIC BLOOD PRESSURE: 157 MMHG | WEIGHT: 243.63 LBS | HEART RATE: 79 BPM | TEMPERATURE: 97 F | OXYGEN SATURATION: 100 % | HEIGHT: 69 IN

## 2024-06-20 PROBLEM — F10.930 ALCOHOL WITHDRAWAL SYNDROME WITHOUT COMPLICATION: Status: RESOLVED | Noted: 2024-06-18 | Resolved: 2024-06-20

## 2024-06-20 PROBLEM — I48.91 ATRIAL FIBRILLATION WITH RVR: Status: RESOLVED | Noted: 2024-06-18 | Resolved: 2024-06-20

## 2024-06-20 PROBLEM — R00.0 SINUS TACHYCARDIA: Status: RESOLVED | Noted: 2024-06-18 | Resolved: 2024-06-20

## 2024-06-20 PROBLEM — R74.8 ELEVATED LIVER ENZYMES: Status: RESOLVED | Noted: 2024-06-18 | Resolved: 2024-06-20

## 2024-06-20 LAB
ALBUMIN SERPL BCP-MCNC: 3.5 G/DL (ref 3.5–5.2)
ALP SERPL-CCNC: 65 U/L (ref 55–135)
ALT SERPL W/O P-5'-P-CCNC: 67 U/L (ref 10–44)
ANION GAP SERPL CALC-SCNC: 10 MMOL/L (ref 8–16)
AST SERPL-CCNC: 64 U/L (ref 10–40)
BASOPHILS # BLD AUTO: 0.03 K/UL (ref 0–0.2)
BASOPHILS NFR BLD: 0.5 % (ref 0–1.9)
BILIRUB SERPL-MCNC: 0.7 MG/DL (ref 0.1–1)
BUN SERPL-MCNC: 13 MG/DL (ref 6–20)
CALCIUM SERPL-MCNC: 9 MG/DL (ref 8.7–10.5)
CHLORIDE SERPL-SCNC: 96 MMOL/L (ref 95–110)
CO2 SERPL-SCNC: 26 MMOL/L (ref 23–29)
CREAT SERPL-MCNC: 0.9 MG/DL (ref 0.5–1.4)
DIFFERENTIAL METHOD BLD: ABNORMAL
EOSINOPHIL # BLD AUTO: 0.1 K/UL (ref 0–0.5)
EOSINOPHIL NFR BLD: 1.5 % (ref 0–8)
ERYTHROCYTE [DISTWIDTH] IN BLOOD BY AUTOMATED COUNT: 13.9 % (ref 11.5–14.5)
EST. GFR  (NO RACE VARIABLE): >60 ML/MIN/1.73 M^2
GLUCOSE SERPL-MCNC: 103 MG/DL (ref 70–110)
HCT VFR BLD AUTO: 42.9 % (ref 40–54)
HGB BLD-MCNC: 15.3 G/DL (ref 14–18)
IMM GRANULOCYTES # BLD AUTO: 0.03 K/UL (ref 0–0.04)
IMM GRANULOCYTES NFR BLD AUTO: 0.5 % (ref 0–0.5)
LYMPHOCYTES # BLD AUTO: 1.1 K/UL (ref 1–4.8)
LYMPHOCYTES NFR BLD: 17 % (ref 18–48)
MCH RBC QN AUTO: 34.3 PG (ref 27–31)
MCHC RBC AUTO-ENTMCNC: 35.7 G/DL (ref 32–36)
MCV RBC AUTO: 96 FL (ref 82–98)
MONOCYTES # BLD AUTO: 0.6 K/UL (ref 0.3–1)
MONOCYTES NFR BLD: 10.4 % (ref 4–15)
NEUTROPHILS # BLD AUTO: 4.3 K/UL (ref 1.8–7.7)
NEUTROPHILS NFR BLD: 70.1 % (ref 38–73)
NRBC BLD-RTO: 0 /100 WBC
PLATELET # BLD AUTO: 112 K/UL (ref 150–450)
PMV BLD AUTO: 9.2 FL (ref 9.2–12.9)
POTASSIUM SERPL-SCNC: 4 MMOL/L (ref 3.5–5.1)
PROT SERPL-MCNC: 6.4 G/DL (ref 6–8.4)
RBC # BLD AUTO: 4.46 M/UL (ref 4.6–6.2)
SODIUM SERPL-SCNC: 132 MMOL/L (ref 136–145)
WBC # BLD AUTO: 6.17 K/UL (ref 3.9–12.7)

## 2024-06-20 PROCEDURE — 85025 COMPLETE CBC W/AUTO DIFF WBC: CPT

## 2024-06-20 PROCEDURE — 80053 COMPREHEN METABOLIC PANEL: CPT

## 2024-06-20 PROCEDURE — 90833 PSYTX W PT W E/M 30 MIN: CPT | Mod: ,,, | Performed by: PSYCHIATRY & NEUROLOGY

## 2024-06-20 PROCEDURE — 25000003 PHARM REV CODE 250: Performed by: STUDENT IN AN ORGANIZED HEALTH CARE EDUCATION/TRAINING PROGRAM

## 2024-06-20 PROCEDURE — 99900035 HC TECH TIME PER 15 MIN (STAT)

## 2024-06-20 PROCEDURE — 99233 SBSQ HOSP IP/OBS HIGH 50: CPT | Mod: ,,, | Performed by: PSYCHIATRY & NEUROLOGY

## 2024-06-20 PROCEDURE — 25000003 PHARM REV CODE 250: Performed by: PSYCHIATRY & NEUROLOGY

## 2024-06-20 PROCEDURE — 94660 CPAP INITIATION&MGMT: CPT

## 2024-06-20 PROCEDURE — 27100171 HC OXYGEN HIGH FLOW UP TO 24 HOURS

## 2024-06-20 PROCEDURE — 25000003 PHARM REV CODE 250: Performed by: NURSE PRACTITIONER

## 2024-06-20 PROCEDURE — 63600175 PHARM REV CODE 636 W HCPCS: Performed by: STUDENT IN AN ORGANIZED HEALTH CARE EDUCATION/TRAINING PROGRAM

## 2024-06-20 PROCEDURE — 36415 COLL VENOUS BLD VENIPUNCTURE: CPT

## 2024-06-20 PROCEDURE — 94761 N-INVAS EAR/PLS OXIMETRY MLT: CPT

## 2024-06-20 RX ORDER — AMLODIPINE BESYLATE 5 MG/1
5 TABLET ORAL DAILY
Qty: 90 TABLET | Refills: 3 | Status: SHIPPED | OUTPATIENT
Start: 2024-06-21 | End: 2025-06-21

## 2024-06-20 RX ORDER — AMLODIPINE BESYLATE 5 MG/1
5 TABLET ORAL DAILY
Status: DISCONTINUED | OUTPATIENT
Start: 2024-06-20 | End: 2024-06-20 | Stop reason: HOSPADM

## 2024-06-20 RX ORDER — METOPROLOL SUCCINATE 25 MG/1
25 TABLET, EXTENDED RELEASE ORAL DAILY
Status: DISCONTINUED | OUTPATIENT
Start: 2024-06-20 | End: 2024-06-20 | Stop reason: HOSPADM

## 2024-06-20 RX ORDER — METOPROLOL SUCCINATE 25 MG/1
25 TABLET, EXTENDED RELEASE ORAL DAILY
Qty: 90 TABLET | Refills: 3 | Status: SHIPPED | OUTPATIENT
Start: 2024-06-21 | End: 2025-06-21

## 2024-06-20 RX ORDER — TALC
3 POWDER (GRAM) TOPICAL NIGHTLY
Qty: 30 TABLET | Refills: 0 | Status: SHIPPED | OUTPATIENT
Start: 2024-06-20

## 2024-06-20 RX ORDER — DIAZEPAM 5 MG/1
5 TABLET ORAL EVERY 12 HOURS
Status: DISCONTINUED | OUTPATIENT
Start: 2024-06-20 | End: 2024-06-20 | Stop reason: HOSPADM

## 2024-06-20 RX ORDER — SERTRALINE HYDROCHLORIDE 50 MG/1
50 TABLET, FILM COATED ORAL DAILY
Status: DISCONTINUED | OUTPATIENT
Start: 2024-06-20 | End: 2024-06-20 | Stop reason: HOSPADM

## 2024-06-20 RX ORDER — TALC
6 POWDER (GRAM) TOPICAL NIGHTLY
Status: DISCONTINUED | OUTPATIENT
Start: 2024-06-20 | End: 2024-06-20 | Stop reason: HOSPADM

## 2024-06-20 RX ORDER — SERTRALINE HYDROCHLORIDE 50 MG/1
50 TABLET, FILM COATED ORAL DAILY
Qty: 30 TABLET | Refills: 11 | Status: SHIPPED | OUTPATIENT
Start: 2024-06-21 | End: 2025-06-21

## 2024-06-20 RX ORDER — DIAZEPAM 5 MG/1
5 TABLET ORAL EVERY 12 HOURS
Qty: 3 TABLET | Refills: 0 | Status: SHIPPED | OUTPATIENT
Start: 2024-06-20 | End: 2024-06-26

## 2024-06-20 RX ORDER — FOLIC ACID 1 MG/1
1 TABLET ORAL DAILY
Qty: 30 TABLET | Refills: 0 | Status: SHIPPED | OUTPATIENT
Start: 2024-06-21 | End: 2025-06-21

## 2024-06-20 RX ORDER — GABAPENTIN 300 MG/1
300 CAPSULE ORAL EVERY 8 HOURS
Qty: 90 CAPSULE | Refills: 11 | Status: SHIPPED | OUTPATIENT
Start: 2024-06-20 | End: 2025-06-20

## 2024-06-20 RX ORDER — LANOLIN ALCOHOL/MO/W.PET/CERES
100 CREAM (GRAM) TOPICAL DAILY
Qty: 30 TABLET | Refills: 0 | Status: SHIPPED | OUTPATIENT
Start: 2024-06-20

## 2024-06-20 RX ADMIN — HYDRALAZINE HYDROCHLORIDE 25 MG: 25 TABLET ORAL at 02:06

## 2024-06-20 RX ADMIN — ENOXAPARIN SODIUM 105 MG: 120 INJECTION SUBCUTANEOUS at 08:06

## 2024-06-20 RX ADMIN — AMLODIPINE BESYLATE 5 MG: 5 TABLET ORAL at 10:06

## 2024-06-20 RX ADMIN — GABAPENTIN 300 MG: 300 CAPSULE ORAL at 02:06

## 2024-06-20 RX ADMIN — DIAZEPAM 5 MG: 5 TABLET ORAL at 06:06

## 2024-06-20 RX ADMIN — FOLIC ACID 1 MG: 1 TABLET ORAL at 08:06

## 2024-06-20 RX ADMIN — GABAPENTIN 300 MG: 300 CAPSULE ORAL at 06:06

## 2024-06-20 RX ADMIN — THIAMINE HYDROCHLORIDE 500 MG: 100 INJECTION, SOLUTION INTRAMUSCULAR; INTRAVENOUS at 08:06

## 2024-06-20 RX ADMIN — SERTRALINE HYDROCHLORIDE 50 MG: 50 TABLET ORAL at 11:06

## 2024-06-20 RX ADMIN — LOSARTAN POTASSIUM 25 MG: 25 TABLET, FILM COATED ORAL at 08:06

## 2024-06-20 RX ADMIN — METOPROLOL SUCCINATE 25 MG: 25 TABLET, EXTENDED RELEASE ORAL at 08:06

## 2024-06-20 RX ADMIN — HYDRALAZINE HYDROCHLORIDE 25 MG: 25 TABLET ORAL at 06:06

## 2024-06-20 RX ADMIN — PANTOPRAZOLE SODIUM 40 MG: 40 TABLET, DELAYED RELEASE ORAL at 08:06

## 2024-06-20 NOTE — CARE UPDATE
Patient remains in SR.  Amiodarone load complete, transitioned to BB.  CHADSVASC 1 (HTN).  Feel Lone AF was stress induced in setting of ETOH withdrawal.  Will need cardiology follow up and proceed with 30 D event monitor as OP.  Do not feel DOAC is needed at this time.

## 2024-06-20 NOTE — DISCHARGE SUMMARY
[facility] Medicine  Discharge Summary      Patient Name: Silverio Devlin  MRN: [id number]  RANDA: [id number]  Patient Class: IP- Inpatient  Admission Date: 6/17/2024  Hospital Length of Stay: 2 days  Discharge Date and Time:  06/20/2024 12:33 PM  Attending Physician: Taryn Eller MD   Discharging Provider: Taryn Eller MD  Primary Care Provider: Lloyd Armando MD    Primary Care Team: Networked reference to record PCT     HPI:   Pt is a 60y/o male with medical hx of anxiety to presented to the ED accompanied by wife with complaint of excessive alcohol intake and not feeling well. Per pt, he recently lost his mother and resulted to drinking alcohol to help with his stress. He does have a hx of excessive alcohol intake. He reports drinking 11 cans of beers and 2 bottle of wine, his wife reports seeing a big bottle of vodka. His last alcohol intake was yesterday. In the ED vitals showed elevated HR, EKG was concerning for afib, this is new onset. Lbas showed the following: cbc normal, cmp showed elevated liver enzymes, cpk, alcohol level were all elevated. Imaging showed the following, CT head: no acute intracranial abnormality, CXR : no cardiopulmonary dz, CT cervical: no acute intracranial abmormality. Pt will be admitted for further management of care.    * No surgery found *      Hospital Course:   6/18: admitted for alcohol intoxication, vitals not stable, Elevated CIWA score, CIWA protocol followed. Transfer to ICU  6/19: Downgraded to tele. Continue alcohol withdrawal treatment while closely monitoring vitals. Still on amio drip and cardio on board.  6/20: doing well. Off amio, transitioned to BB, no more withdrawal symptoms. He will be d/c on 2 more doses of diazepam. One tonight at 8pm and last one tomorrow at 8pm. He should receive a dose prior to leaving. He is advised to f/u with cardio in 2 week. Out pt appointment is being set up. He would be provided resources for Alcohol  abuse. Pt verbalized understanding     Goals of Care Treatment Preferences:  Code Status: Full Code      Consults:   Consults (From admission, onward)          Status Ordering Provider     Inpatient consult to Social Work  Once        Provider:  (Not yet assigned)    Completed SAJAN BURNETT     Inpatient consult to Critical Care Medicine  Once        Provider:  (Not yet assigned)    Completed MANE KIM     [facility]  Once        Provider:  (Not yet assigned)    Completed MANE KIM     Inpatient consult to Telemedicine - Psychiatry  Once        Provider:  (Not yet assigned)    Acknowledged SAJAN BURNETT            No new Assessment & Plan notes have been filed under this hospital service since the last note was generated.  Service: Hospital Medicine    Final Active Diagnoses:    Diagnosis Date Noted POA    Severe obesity (BMI 35.0-39.9) with comorbidity [E66.01] 11/06/2023 Yes      Problems Resolved During this Admission:    Diagnosis Date Noted Date Resolved POA    PRINCIPAL PROBLEM:  Atrial fibrillation with RVR [I48.91] 06/18/2024 06/20/2024 No    Sinus tachycardia [R00.0] 06/18/2024 06/20/2024 Unknown    Alcohol withdrawal syndrome without complication [F10.930] 06/18/2024 06/20/2024 Unknown    Elevated liver enzymes [R74.8] 06/18/2024 06/20/2024 Unknown       Discharged Condition: good    Disposition: Home or Self Care    Follow Up:   Follow-up Information       Lloyd Armnado MD Follow up.    Specialty: Family Medicine  Why:  sent message to PCP office for follow-up. [facility]. Primary Care Physician  Contact information:  [address] [facility] [address]  [phone]                           Patient Instructions:   No discharge procedures on file.    Significant Diagnostic Studies: N/A    Pending Diagnostic Studies:       Procedure Component Value Units Date/Time    EKG 12-lead [[id number]] Collected:  06/18/24 1248    Order Status: Sent Lab Status: In process Updated: 06/20/24 0917     QRS Duration 82 ms      [facility] QTC Calculation 458 ms     Narrative:      Test Reason : R07.9,    Vent. Rate : 173 BPM     Atrial Rate : 300 BPM     P-R Int : 000 ms          QRS Dur : 082 ms      QT Int : 270 ms       P-R-T Axes : 000 065 005 degrees     QTc Int : 458 ms    Atrial fibrillation with rapid ventricular response  Nonspecific ST abnormality  Abnormal ECG  When compared with ECG of 18-JUN-2024 12:01,  No significant change was found    Referred By: PHUONG   SELF           Confirmed By:            Medications:  Reconciled Home Medications:      Medication List        START taking these medications      amLODIPine 5 MG tablet  Commonly known as: NORVASC  Take 1 tablet (5 mg total) by mouth once daily.  Start taking on: June 21, 2024     diazePAM 5 MG tablet  Commonly known as: VALIUM  Take 1 tablet (5 mg total) by mouth every 12 (twelve) hours. Pt is to take one pill today at 8pm and the last one tomorrow at 8pm for 2 days     folic acid 1 MG tablet  Commonly known as: FOLVITE  Take 1 tablet (1 mg total) by mouth once daily.  Start taking on: June 21, 2024     gabapentin 300 MG capsule  Commonly known as: NEURONTIN  Take 1 capsule (300 mg total) by mouth every 8 (eight) hours.     melatonin 3 mg tablet  Commonly known as: MELATIN  Take 1 tablet (3 mg total) by mouth nightly.     metoprolol succinate 25 MG 24 hr tablet  Commonly known as: TOPROL-XL  Take 1 tablet (25 mg total) by mouth once daily.  Start taking on: June 21, 2024     sertraline 50 MG tablet  Commonly known as: ZOLOFT  Take 1 tablet (50 mg total) by mouth once daily.  Start taking on: June 21, 2024     thiamine 100 MG tablet  Take 1 tablet (100 mg total) by mouth once daily.            CONTINUE taking these medications      aspirin 325 MG tablet  Take 325 mg by mouth once daily.     azelastine 137 mcg (0.1 %) nasal spray  Commonly known as: ASTELIN  1 spray  (137 mcg total) by Nasal route 2 (two) times daily.     hydrocortisone 2.5 % rectal cream  Commonly known as: ANUSOL-HC  Place rectally 2 (two) times daily.     ibuprofen 100 MG tablet  Commonly known as: ADVIL,MOTRIN  Take 100 mg by mouth every 6 (six) hours as needed for Temperature greater than.     losartan 25 MG tablet  Commonly known as: COZAAR  Take 1 tablet (25 mg total) by mouth once daily.     omeprazole 20 MG capsule  Commonly known as: PRILOSEC  Take 1 capsule (20 mg total) by mouth once daily.              Indwelling Lines/Drains at time of discharge:   Lines/Drains/Airways       None                   Time spent on the discharge of patient:  minutes         Taryn Eller MD  Department of Hospital Medicine  [facility]

## 2024-06-20 NOTE — TELEPHONE ENCOUNTER
----- Message from Jeanne Montiel RN sent at 6/20/2024 12:33 PM CDT -----  Regarding: Hospital Follow-Up  Hello!       Patient is currently admitted at [facility]. He will be discharging today. Can you please schedule him a hospital follow-up? His MyChart is set up to check appointment information. Thank you!    Thank you!  Jeanne Montiel RN    [phone]

## 2024-06-20 NOTE — PLAN OF CARE
Discharge orders noted. No DME or Home Health ordered. PCP and Cardiology follow-up appointments requested. Substance Abuse resources placed on AVS and printed out packet given to patient. Wife will transport home at discharge. No further Case Management needs.    Patient Contacts    Name Relation Home Work Mobile   Janae Devlin Spouse [phone]         Future Appointments   Date Time Provider Department Center   6/26/2024  9:30 AM Lloyd Armando MD [facility] [place]   7/2/2024 11:30 AM Moiz Stahl MD [facility]         06/20/24 1253   Final Note   Assessment Type Final Discharge Note   Anticipated Discharge Disposition Home   Hospital Resources/Appts/Education Provided Appointments scheduled and added to AVS   Post-Acute Status   Discharge Delays None known at this time     Jeanne Montiel RN    [phone]

## 2024-06-20 NOTE — PLAN OF CARE
went to meet with patient. Patient reports he is independent and lives with his spouse and adult son at home. He does not use any DME or have Home Health. He still works and drives, but his spouse will transport home at discharge. Patient reports his mother recently passed away and had been drinking more this past week. Dr. Brown saw patient and provided resources for ETOH on AVS. I also printed out resources for patient regarding inpatient/outpatient ETOH rehab and AA information as well and provided to patient. Patient reports he does have a MyChart to review appointments.  sent message to PCP and Cardiology offices for follow-up appointments. Patient encouraged to call with any questions or concerns.  will continue to follow patient through transitions of care and assist with any discharge needs.      Patient Contacts    Name Relation Home Work Mobile   Janae Devlin Spouse [phone]          06/20/24 1241   Discharge Assessment   Assessment Type Discharge Planning Assessment   Confirmed/corrected address, phone number and insurance Yes   Confirmed Demographics Correct on Facesheet   Source of Information patient   People in Home significant other;spouse;child(nnamdi), adult   Facility Arrived From: Home   Do you expect to return to your current living situation? Yes   Do you have help at home or someone to help you manage your care at home? Yes   Who are your caregiver(s) and their phone number(s)? Janae Devlin Spouse [phone]   Prior to hospitilization cognitive status: Alert/Oriented   Current cognitive status: Alert/Oriented   Walking or Climbing Stairs Difficulty no   Dressing/Bathing Difficulty no   Equipment Currently Used at Home none   Do you take prescription medications? Yes   Do you have prescription coverage? Yes   Do you have any problems affording any of your prescribed medications? No   Is the patient taking medications as prescribed? yes   Who is going to  help you get home at discharge? Janae Devlin Spouse [phone]   How do you get to doctors appointments? car, drives self   Are you on dialysis? No   Do you take coumadin? No   Discharge Plan A Home   Discharge Plan B Home with family   DME Needed Upon Discharge  none   Discharge Plan discussed with: Patient   Transition of Care Barriers Substance Abuse   Physical Activity   On average, how many days per week do you engage in moderate to strenuous exercise (like a brisk walk)? Pt Declined   On average, how many minutes do you engage in exercise at this level? Pt Declined   Financial Resource Strain   How hard is it for you to pay for the very basics like food, housing, medical care, and heating? Not hard   Housing Stability   In the last 12 months, was there a time when you were not able to pay the mortgage or rent on time? N   At any time in the past 12 months, were you homeless or living in a shelter (including now)? N   Transportation Needs   Has the lack of transportation kept you from medical appointments, meetings, work or from getting things needed for daily living? No   Food Insecurity   Within the past 12 months, you worried that your food would run out before you got the money to buy more. Never true   Within the past 12 months, the food you bought just didn't last and you didn't have money to get more. Never true   Stress   Do you feel stress - tense, restless, nervous, or anxious, or unable to sleep at night because your mind is troubled all the time - these days? To some exte  (Mother recently passed away.)   Social Isolation   How often do you feel lonely or isolated from those around you?  Never   Alcohol Use   Q1: How often do you have a drink containing alcohol? 2-3 per wk   Q2: How many drinks containing alcohol do you have on a typical day when you are drinking?   (Bottle of Wine at Dinner (at times))   Q3: How often do you have six or more drinks on one occasion? Weekly   Utilities   In the  past 12 months has the electric, gas, oil, or water company threatened to shut off services in your home? No   Health Literacy   How often do you need to have someone help you when you read instructions, pamphlets, or other written material from your doctor or pharmacy? Never     Jeanne Montiel RN    [phone]

## 2024-06-20 NOTE — PROGRESS NOTES
PSYCHIATRY INPATIENT PROGRESS NOTE  SUBSEQUENT HOSPITAL VISIT      2024 7:00 AM   Silverio Devlin   1965   [id number]           DATE OF ADMISSION: 2024  6:37 PM    SITE: [facility]    CURRENT LEGAL STATUS: Patient does not currently meet PEC criteria due to not currently being an imminent threat to self/others and not being gravely disabled 2/2 mental illness at this time.       HISTORY    Per Initial History from Primary Team:  Patient presents with    Alcohol Intoxication       Pt's wife states pt has been on alcohol binge for approx 1.5 weeks. She reports he has had approx 12 beers, a bottle of wine and vodka every day. Last binge was 5 years ago. Pt slurring words and smells of alcohol. Wife reports pt's mother  last week. No SI/HI.   Pt is a 60y/o male with medical hx of anxiety to presented to the ED accompanied by wife with complaint of excessive alcohol intake and not feeling well. Per pt, he recently lost his mother and resulted to drinking alcohol to help with his stress. He does have a hx of excessive alcohol intake. He reports drinking 11 cans of beers and 2 bottle of wine, his wife reports seeing a big bottle of vodka. His last alcohol intake was yesterday. In the ED vitals showed elevated HR, EKG was concerning for afib, this is new onset. Lbas showed the following: cbc normal, cmp showed elevated liver enzymes, cpk, alcohol level were all elevated. Imaging showed the following, CT head: no acute intracranial abnormality, CXR : no cardiopulmonary dz, CT cervical: no acute intracranial abmormality. Pt will be admitted for further management of care.  Overview/Hospital Course from Primary Team:  : admitted for alcohol intoxication, vitals not stable, Elevated CIWA score, CIWA protocol followed. Transfer to ICU  : Downgraded to tele. Continue alcohol withdrawal treatment while closely monitoring vitals. Still on amio drip and cardio on board.  Interval History from Primary Team  on 06/19/2024:   pt is seen and examined. He has no complaint and reports doing well.       Chief Complaint / Reason for Original Psychiatry Consult: Alcohol Abuse / Dependence ; possible Passive SI       Subjective / Interval Psychiatric History Today (06/20/2024) (with Psychiatric ROS below):   Silverio Devlin is a 59 y.o. male with a past medical history as noted above/below and a past psychiatric history of Alcohol Use Disorder and Anxiety, currently being treated by his inpatient primary team for a principle problem of Atrial Fibrillation with RVR and Alcohol Withdrawal.  Psychiatry was originally consulted as noted above.  The patient was seen and examined again today (originally seen by my colleague, Dr. Fish, on 06/18/2024).  The chart was reviewed again today.  He has not required any additional PRN benzodiazepines today.  His CIWA-Ar score this morning was unremarkable.  He was non-tremulous on exam.  On examination today, the patient was alert and oriented to person, place, city, state, month, year, and situation.  He was CAM-ICU negative for delirium.  Regardin his mother's death, he states that while he has had some intermittent typical sadness, he actually feels somewhat of a sense of relief now that she is no longer suffering.  He denies any current symptoms of depression.  He does endorse struggling with anxiety / LEE ANN symptoms for the past 3-4 years, which he states that he has been drinking alcohol to try to self-medicate this anxiety.  He denies s/s of panic.  He denies any current or prior active or passive SI / HI.  He denies any current or prior AH, VH, TH, delusions, paranoia, or DIGFAST (mar) s/s.  He endorses sleeping about 6-7 hours per night (some sleep-wake cycle issues at times).  He endorses a good appetite.  He denies any adverse effects to his current medication regimen.  Regarding current medical/physical complaints, he endorses improving fatigue.  He denies any other medical  complaints at this time.  NAD was observed during the examination.  Psychotherapy was implemented as noted below with a focus on improving anxiety, sleep, and alcohol cessation / total sobriety.  Despite motivational interviewing, counseling, and education, the patient denies any interest in residential rehab, IOPs, 12 steps meetings, or MAT at this time.  He does endorses a goal of total sobriety via outpatient care at this time.  See detailed psych ROS below.  See A/P below.        Psychiatric Review Of Systems - Currently, the patient is endorsing and/or denying the following:  (patient's endorsements are BOLDED below; if not BOLDED, then patient denied):    Denies Symptoms of Depression: diminished mood, low motivation, loss of interest/anhedonia, irritability, diminished energy, change in sleep, change in appetite, diminished concentration or cognition or indecisiveness, PMA/R, excessive guilt or hopelessness or worthlessness, suicidal ideations    Endorses intermittent issues with Sleep: initiation, maintenance, early morning awakening with inability to return to sleep    Denies Suicidal/Homicidal ideations: active/passive ideations, organized plans, future intentions    Denies Symptoms of psychosis: hallucinations, delusions, disorganized thinking, disorganized behavior or abnormal motor behavior, or negative symptoms (diminshed emotional expression, avolition, anhedonia, alogia, asociality)     Denies Symptoms of mar or hypomania: elevated, expansive, or irritable mood with increased energy or activity; with inflated self-esteem or grandiosity, decreased need for sleep, increased rate of speech, FOI or racing thoughts, distractibility, increased goal directed activity or PMA, risky/disinhibited behavior    Endorses Symptoms of Anxiety / LEE ANN: excessive anxiety/worry/fear, more days than not, about numerous issues, difficult to control, with restlessness, fatigue, poor concentration, irritability, muscle  tension, sleep disturbance; causes functionally impairing distress     Denies Symptoms of Panic Disorder: recurrent panic attacks, precipitated or un-precipitated, source of worry and/or behavioral changes secondary; with or without agoraphobia    Denies Symptoms of PTSD: h/o trauma; re-experiencing/intrusive symptoms, avoidant behavior, negative alterations in cognition or mood, or hyperarousal symptoms; with or without dissociative symptoms     Denies Symptoms of OCD: obsessions or compulsions     Denies Symptoms of Eating Disorders: anorexia, bulimia or binging    Endorses Substance Use (alcohol): intoxication, withdrawal, tolerance, used in larger amounts or duration than intended, unsuccessful attempts to limit or quit, increased time engaging in or seeking out, cravings or strong desire to use, failure to fulfill obligations, negative consequences in social/interpersonal/occupational,/recreational areas, use in dangerous situations, medical or psychological consequences       [facility] Toolkit ASQ Suicide Screening Tool:  In the past few weeks, have you wished you were dead? Denies   In the past few weeks, have you felt that you or your family would be better off if you were dead? Denies  In the past week, have you been having thoughts about killing yourself? Denies  Have you ever tried to kill yourself? Denies  Are you having thoughts of killing yourself right now? Denies       PSYCHOTHERAPY ADD-ON [id number]   30 (16-37*) minutes    Time: 20 minutes  Participants: Met with patient    Therapeutic Intervention Type: behavior modifying psychotherapy, supportive psychotherapy  Why chosen therapy is appropriate versus another modality: relevant to diagnosis, patient responds to this modality, evidence based practice    Target symptoms: anxiety, insomnia, and alcohol abuse / dependence   Primary focus: improving anxiety, sleep, and alcohol cessation / total sobriety  Psychotherapeutic techniques: supportive and  psychodynamic techniques; psycho-education; deep breathing exercises; motivational interviewing; reality / insight orientation; CBT; problem solving techniques and managing life stressors    Outcome monitoring methods: self-report, observation    Patient's response to intervention:  The patient's response to intervention is accepting.    Progress toward goals:  The patient's progress toward goals is fair.      ROS:  General ROS: negative for - chills, fever or night sweats; positive for improving fatigue   Ophthalmic ROS: negative for - blurry vision, double vision or eye pain  ENT ROS: negative for - sinus pain, headaches, sore throat or visual changes  Allergy and Immunology ROS: negative for - hives, itchy/watery eyes or nasal congestion  Hematological and Lymphatic ROS: negative for - bleeding problems, bruising, jaundice or pallor  Endocrine ROS: negative for - galactorrhea, hot flashes, mood swings, palpitations or temperature intolerance  Respiratory ROS: negative for - cough, hemoptysis, shortness of breath, tachypnea or wheezing  Cardiovascular ROS: negative for - chest pain, dyspnea on exertion, loss of consciousness, palpitations, rapid heart rate or shortness of breath  Gastrointestinal ROS: negative for - appetite loss, nausea, abdominal pain, blood in stools, change in bowel habits, constipation or diarrhea  Genito-Urinary ROS: negative for - incontinence, nocturia or pelvic pain  Musculoskeletal ROS: negative for - joint stiffness, joint swelling, joint pain or muscle pain   Neurological ROS: negative for - behavioral changes, confusion, dizziness, memory loss, numbness/tingling or seizures  Dermatological ROS: negative for dry skin, hair changes, pruritus or rash  Psychiatric ROS: see detailed psychiatric ROS above in subjective section       Past Psychiatric History:  Previous Medication Trials: denies  Previous Psychiatric Hospitalizations: denies   Previous Suicide Attempts: denies   History of  Violence: denies  Outpatient Psychiatrist: denies     Social History:  Marital Status:   Children: 2 adult children   Employment Status/Info: currently employed in sales   Education: high school diploma/GED  Special Ed: denies  : denies  Orthodoxy: Hindu   Housing Status: lives with wife in [place]  Hobbies/Leisure time: time with family   History of phys/sexual abuse: denies  Access to gun: denies     Family Psychiatric History: Mother with Dementia     Substance Abuse History (with focus on the past 12 months):  Recreational Drugs: denies   Use of Alcohol: heavy daily alcohol abuse (see note from 06/18/2024) ; denies hx of complicated withdrawal   Rehab History: denies  Tobacco Use: denies  Use of Caffeine: denies  Use of OTC: denies  Legal consequences of chemical use: denies     Legal History:  Past Charges/Incarcerations: denies   Pending charges: denies      Psychosocial Stressors: occupational, mother's death, and alcohol abuse / dependence   Functioning Relationships: good support system in wife, adult children, and other family members   Strengths AND Liabilities: Strength: Patient accepts guidance/feedback, Strength: Patient is expressive/articulate., Strength: Patient is intelligent., Strength: Patient is motivated for change., Strength: Patient has positive support network., Strength: Patient has reasonable judgment., Liability: Patient lacks coping skills.      PAST MEDICAL & SURGICAL HISTORY   Past Medical History:   Diagnosis Date    Headache     Heartburn      Past Surgical History:   Procedure Laterality Date    COLONOSCOPY N/A 2/14/2024    Procedure: COLONOSCOPY;  Surgeon: Kvng Jay MD;  Location: [facility] ENDOSCOPY;  Service: Endoscopy;  Laterality: N/A;  Referral: Lloyd Armando MD / [place] / Inst portal / LW  2/7- pc complete. DBM  2-12 pre call complete [facility]    ESOPHAGOGASTRODUODENOSCOPY N/A 2/14/2024    Procedure: EGD (ESOPHAGOGASTRODUODENOSCOPY);  Surgeon:  Kvng Jay MD;  Location: [facility] ENDOSCOPY;  Service: Endoscopy;  Laterality: N/A;    NASAL SEPTOPLASTY N/A 11/7/2022    Procedure: SEPTOPLASTY, NOSE;  Surgeon: Gwyn Mendez MD;  Location: [facility] OR;  Service: ENT;  Laterality: N/A;    NASAL STENOSIS REPAIR N/A 11/7/2022    Procedure: REPAIR, STENOSIS, NOSE, VESTIBULE;  Surgeon: Gwyn Mendez MD;  Location: [facility] OR;  Service: ENT;  Laterality: N/A;  3.5 HRS       NEUROLOGIC HISTORY  Seizures: denies   Head trauma with LOC: denies    CVA: denies     FAMILY HISTORY   Family History   Problem Relation Name Age of Onset    Dementia Mother      Hypertension Father      Thyroid disease Father      LUIS disease Father      Hypertension Brother      Guillain-Crosby syndrome Brother         ALLERGIES   Review of patient's allergies indicates:  No Known Allergies    CURRENT MEDICATION REGIMEN   Home Meds:   Prior to Admission medications    Medication Sig Start Date End Date Taking? Authorizing Provider   aspirin 325 MG tablet Take 325 mg by mouth once daily.   Yes Provider, Historical   losartan (COZAAR) 25 MG tablet Take 1 tablet (25 mg total) by mouth once daily. 11/6/23 11/5/24 Yes Lloyd Armando MD   omeprazole (PRILOSEC) 20 MG capsule Take 1 capsule (20 mg total) by mouth once daily. 11/6/23 11/5/24 Yes Lloyd Armando MD   azelastine (ASTELIN) 137 mcg (0.1 %) nasal spray 1 spray (137 mcg total) by Nasal route 2 (two) times daily. 10/23/23 10/22/24  Mitchel Bush MD   hydrocortisone (ANUSOL-HC) 2.5 % rectal cream Place rectally 2 (two) times daily. 6/5/24   Tila Vizcarra MD   ibuprofen (ADVIL,MOTRIN) 100 MG tablet Take 100 mg by mouth every 6 (six) hours as needed for Temperature greater than.    Provider, Historical       OTC Meds: denies     Scheduled Meds:    amLODIPine  5 mg Oral Daily    diazePAM  5 mg Oral Q8H    enoxparin  1 mg/kg Subcutaneous Q12H (prophylaxis, 0900/2100)    folic acid  1 mg Oral Daily    gabapentin  300 mg Oral Q8H     hydrALAZINE  25 mg Oral Q8H    losartan  25 mg Oral Daily    metoprolol succinate  25 mg Oral Daily    pantoprazole  40 mg Oral Daily    [START ON 6/22/2024] thiamine (B-1) 250 mg in dextrose 5 % (D5W) 100 mL IVPB  250 mg Intravenous Daily    thiamine (B-1) 500 mg in dextrose 5 % (D5W) 100 mL IVPB  500 mg Intravenous TID      PRN Meds:   Current Facility-Administered Medications:     dextrose 10%, 12.5 g, Intravenous, PRN    dextrose 10%, 25 g, Intravenous, PRN    diazePAM, 5 mg, Intravenous, Q4H PRN    glucagon (human recombinant), 1 mg, Intramuscular, PRN    glucose, 16 g, Oral, PRN    glucose, 24 g, Oral, PRN    hydrALAZINE, 10 mg, Intravenous, Q4H PRN    naloxone, 0.02 mg, Intravenous, PRN    sodium chloride 0.9%, 10 mL, Intravenous, Q12H PRN   Psychotherapeutics (From admission, onward)      Start     Stop Route Frequency Ordered    06/19/24 2200  diazePAM tablet 5 mg         -- Oral Every 8 hours 06/19/24 1649    06/18/24 1659  diazePAM injection 5 mg         -- IV Every 4 hours PRN 06/18/24 1600            LABORATORY DATA   Recent Results (from the past 72 hour(s))   EKG 12-lead    Collection Time: 06/17/24  7:20 PM   Result Value Ref Range    QRS Duration 90 ms    [facility] QTC Calculation 452 ms   Urinalysis, Reflex to Urine Culture Urine, Clean Catch    Collection Time: 06/17/24  7:39 PM    Specimen: Urine, Clean Catch   Result Value Ref Range    Specimen UA Urine, Clean Catch     Color, UA Colorless (A) Yellow, Straw, Quynh    Appearance, UA Clear Clear    pH, UA 6.0 5.0 - 8.0    Specific Gravity, UA <1.005 (A) 1.005 - 1.030    Protein, UA Negative Negative    Glucose, UA Negative Negative    Ketones, UA Negative Negative    Bilirubin (UA) Negative Negative    Occult Blood UA Negative Negative    Nitrite, UA Negative Negative    Urobilinogen, UA Negative <2.0 EU/dL    Leukocytes, UA Negative Negative   Drug screen panel, emergency    Collection Time: 06/17/24  7:39 PM   Result Value Ref Range    Benzodiazepines  Negative Negative    Methadone metabolites Negative Negative    Cocaine (Metab.) Negative Negative    Opiate Scrn, Ur Negative Negative    Barbiturate Screen, Ur Negative Negative    Amphetamine Screen, Ur Negative Negative    THC Negative Negative    Phencyclidine Negative Negative    Creatinine, Urine 12.9 (L) 23.0 - 375.0 mg/dL    Toxicology Information SEE COMMENT    CBC auto differential    Collection Time: 06/17/24  7:59 PM   Result Value Ref Range    WBC 5.02 3.90 - 12.70 K/uL    RBC 5.08 4.60 - 6.20 M/uL    Hemoglobin 17.2 14.0 - 18.0 g/dL    Hematocrit 47.6 40.0 - 54.0 %    MCV 94 82 - 98 fL    MCH 33.9 (H) 27.0 - 31.0 pg    MCHC 36.1 (H) 32.0 - 36.0 g/dL    RDW 13.4 11.5 - 14.5 %    Platelets 153 150 - 450 K/uL    MPV 9.0 (L) 9.2 - 12.9 fL    Immature Granulocytes 0.8 (H) 0.0 - 0.5 %    Gran # (ANC) 2.9 1.8 - 7.7 K/uL    Immature Grans (Abs) 0.04 0.00 - 0.04 K/uL    Lymph # 1.2 1.0 - 4.8 K/uL    Mono # 0.8 0.3 - 1.0 K/uL    Eos # 0.0 0.0 - 0.5 K/uL    Baso # 0.04 0.00 - 0.20 K/uL    nRBC 0 0 /100 WBC    Gran % 57.8 38.0 - 73.0 %    Lymph % 23.9 18.0 - 48.0 %    Mono % 16.3 (H) 4.0 - 15.0 %    Eosinophil % 0.4 0.0 - 8.0 %    Basophil % 0.8 0.0 - 1.9 %    Differential Method Automated    Comprehensive metabolic panel    Collection Time: 06/17/24  7:59 PM   Result Value Ref Range    Sodium 132 (L) 136 - 145 mmol/L    Potassium 4.7 3.5 - 5.1 mmol/L    Chloride 90 (L) 95 - 110 mmol/L    CO2 24 23 - 29 mmol/L    Glucose 97 70 - 110 mg/dL    BUN 5 (L) 6 - 20 mg/dL    Creatinine 0.8 0.5 - 1.4 mg/dL    Calcium 8.9 8.7 - 10.5 mg/dL    Total Protein 7.9 6.0 - 8.4 g/dL    Albumin 4.3 3.5 - 5.2 g/dL    Total Bilirubin 0.4 0.1 - 1.0 mg/dL    Alkaline Phosphatase 86 55 - 135 U/L    AST 99 (H) 10 - 40 U/L    ALT 78 (H) 10 - 44 U/L    eGFR >60 >60 mL/min/1.73 m^2    Anion Gap 18 (H) 8 - 16 mmol/L   Magnesium    Collection Time: 06/17/24  7:59 PM   Result Value Ref Range    Magnesium 2.2 1.6 - 2.6 mg/dL   Lipase     Collection Time: 06/17/24  7:59 PM   Result Value Ref Range    Lipase 26 4 - 60 U/L   Ethanol    Collection Time: 06/17/24  7:59 PM   Result Value Ref Range    Alcohol, Serum 411 (HH) <10 mg/dL   TSH    Collection Time: 06/17/24  7:59 PM   Result Value Ref Range    TSH 1.466 0.400 - 4.000 uIU/mL   Acetaminophen level    Collection Time: 06/17/24  7:59 PM   Result Value Ref Range    Acetaminophen (Tylenol), Serum <3.0 (L) 10.0 - 20.0 ug/mL   Salicylate level    Collection Time: 06/17/24  7:59 PM   Result Value Ref Range    Salicylate Lvl <5.0 (L) 15.0 - 30.0 mg/dL   CPK    Collection Time: 06/17/24  7:59 PM   Result Value Ref Range     (H) 20 - 200 U/L   Troponin I    Collection Time: 06/17/24 10:43 PM   Result Value Ref Range    Troponin I 0.015 0.000 - 0.026 ng/mL   Brain natriuretic peptide    Collection Time: 06/17/24 10:43 PM   Result Value Ref Range    BNP 27 0 - 99 pg/mL   Ethanol    Collection Time: 06/18/24  1:36 AM   Result Value Ref Range    Alcohol, Serum 258 (H) <10 mg/dL   Ethanol    Collection Time: 06/18/24  6:38 AM   Result Value Ref Range    Alcohol, Serum 139 (H) <10 mg/dL   EKG 12-lead    Collection Time: 06/18/24  9:56 AM   Result Value Ref Range    QRS Duration 80 ms    OHS QTC Calculation 462 ms   Ethanol    Collection Time: 06/18/24 10:00 AM   Result Value Ref Range    Alcohol, Serum 52 (H) <10 mg/dL   Troponin I    Collection Time: 06/18/24 10:00 AM   Result Value Ref Range    Troponin I <0.006 0.000 - 0.026 ng/mL   EKG 12-lead    Collection Time: 06/18/24 12:01 PM   Result Value Ref Range    QRS Duration 80 ms    OHS QTC Calculation 465 ms   EKG 12-lead    Collection Time: 06/18/24 12:48 PM   Result Value Ref Range    QRS Duration 82 ms    OHS QTC Calculation 458 ms   POCT glucose    Collection Time: 06/18/24  3:06 PM   Result Value Ref Range    POCT Glucose 121 (H) 70 - 110 mg/dL   POCT glucose    Collection Time: 06/18/24  5:15 PM   Result Value Ref Range    POCT Glucose 125 (H) 70  - 110 mg/dL   EKG 12-lead    Collection Time: 06/19/24  7:23 AM   Result Value Ref Range    QRS Duration 88 ms    OHS QTC Calculation 448 ms   CBC auto differential    Collection Time: 06/19/24  9:18 AM   Result Value Ref Range    WBC 4.94 3.90 - 12.70 K/uL    RBC 4.56 (L) 4.60 - 6.20 M/uL    Hemoglobin 15.7 14.0 - 18.0 g/dL    Hematocrit 44.1 40.0 - 54.0 %    MCV 97 82 - 98 fL    MCH 34.4 (H) 27.0 - 31.0 pg    MCHC 35.6 32.0 - 36.0 g/dL    RDW 14.0 11.5 - 14.5 %    Platelets 128 (L) 150 - 450 K/uL    MPV 9.1 (L) 9.2 - 12.9 fL    Immature Granulocytes 0.6 (H) 0.0 - 0.5 %    Gran # (ANC) 3.3 1.8 - 7.7 K/uL    Immature Grans (Abs) 0.03 0.00 - 0.04 K/uL    Lymph # 0.9 (L) 1.0 - 4.8 K/uL    Mono # 0.7 0.3 - 1.0 K/uL    Eos # 0.0 0.0 - 0.5 K/uL    Baso # 0.03 0.00 - 0.20 K/uL    nRBC 0 0 /100 WBC    Gran % 66.0 38.0 - 73.0 %    Lymph % 17.6 (L) 18.0 - 48.0 %    Mono % 14.6 4.0 - 15.0 %    Eosinophil % 0.6 0.0 - 8.0 %    Basophil % 0.6 0.0 - 1.9 %    Differential Method Automated    Comprehensive metabolic panel    Collection Time: 06/19/24  9:18 AM   Result Value Ref Range    Sodium 133 (L) 136 - 145 mmol/L    Potassium 4.4 3.5 - 5.1 mmol/L    Chloride 94 (L) 95 - 110 mmol/L    CO2 27 23 - 29 mmol/L    Glucose 104 70 - 110 mg/dL    BUN 11 6 - 20 mg/dL    Creatinine 0.9 0.5 - 1.4 mg/dL    Calcium 9.4 8.7 - 10.5 mg/dL    Total Protein 6.7 6.0 - 8.4 g/dL    Albumin 3.7 3.5 - 5.2 g/dL    Total Bilirubin 0.6 0.1 - 1.0 mg/dL    Alkaline Phosphatase 73 55 - 135 U/L    AST 66 (H) 10 - 40 U/L    ALT 72 (H) 10 - 44 U/L    eGFR >60 >60 mL/min/1.73 m^2    Anion Gap 12 8 - 16 mmol/L   Echo    Collection Time: 06/19/24  9:30 AM   Result Value Ref Range    LVOT stroke volume 75.86 cm3    LVIDd 4.96 3.5 - 6.0 cm    LV Systolic Volume 48.56 mL    LVIDs 3.43 2.1 - 4.0 cm    LV Diastolic Volume 115.81 mL    IVS 1.17 (A) 0.6 - 1.1 cm    LVOT diameter 2.13 cm    LVOT area 3.6 cm2    FS 31 28 - 44 %    Left Ventricle Relative Wall Thickness  0.52 cm    Posterior Wall 1.28 (A) 0.6 - 1.1 cm    LV mass 237.59 g    MV Peak E Raúl 0.76 m/s    TDI LATERAL 0.11 m/s    TDI SEPTAL 0.10 m/s    E/E' ratio 7.24 m/s    MV Peak A Raúl 0.55 m/s    E/A ratio 1.38     IVRT 119.89 msec    E wave deceleration time 142.34 msec    LV SEPTAL E/E' RATIO 7.60 m/s    LV LATERAL E/E' RATIO 6.91 m/s    LA volume 64.16 cm3    LVOT peak raúl 1.27 m/s    RV S' 16.85 cm/s    TAPSE 2.14 cm    RV/LV Ratio 0.61 cm    LA size 3.91 cm    Left Atrium Minor Axis 5.66 cm    Left Atrium Major Axis 5.32 cm    LA volume (mod) 56.97 cm3    LA WIDTH 3.52 cm    RA Major Axis 4.65 cm    AV mean gradient 4 mmHg    AV peak gradient 7 mmHg    Ao peak raúl 1.33 m/s    Ao VTI 25.90 cm    LVOT peak VTI 21.30 cm    AV valve area 2.93 cm²    AV Velocity Ratio 0.95     AV index (prosthetic) 0.82     JACINTO by Velocity Ratio 3.40 cm²    MV stenosis pressure 1/2 time 41.28 ms    MV valve area p 1/2 method 5.33 cm2    STJ 2.70 cm    Ascending aorta 3.58 cm    Mean e' 0.11 m/s    RVDD 3.03 cm    BSA 2.33 m2    LV Systolic Volume Index 21.6 mL/m2    LV Diastolic Volume Index 51.47 mL/m2    LV Mass Index 106 g/m2    LA Volume Index 28.5 mL/m2    LA Volume Index (Mod) 25.3 mL/m2    ZLVIDS -2.88     ZLVIDD -4.97     Vasquez's Biplane MOD Ejection Fraction 63 %    A2C EF 67 %    A4C EF 60 %    LV ESV A2C 63.32 mL    LV ESV A4C 50.05 mL    LV EDV A2C [id number] mL    LV EDV A4C 65.48 mL    Left Ventricular End Systolic Volume by Teichholz Method 48.56 mL    Left Ventricular End Diastolic Volume by Teichholz Method 115.81 mL    Left Ventricular Outflow Tract Mean Velocity 0.89 cm/s    Left Ventricular Outflow Tract Mean Gradient 3.45 mmHg    RA Width 3.28 cm    Sinus 3.59 cm    LA area A4C 19.11 cm2    LA area A2C 21.91 cm2    Est. RA pres 3 mmHg   CBC auto differential    Collection Time: 06/20/24  3:18 AM   Result Value Ref Range    WBC 6.17 3.90 - 12.70 K/uL    RBC 4.46 (L) 4.60 - 6.20 M/uL    Hemoglobin 15.3 14.0  "- 18.0 g/dL    Hematocrit 42.9 40.0 - 54.0 %    MCV 96 82 - 98 fL    MCH 34.3 (H) 27.0 - 31.0 pg    MCHC 35.7 32.0 - 36.0 g/dL    RDW 13.9 11.5 - 14.5 %    Platelets 112 (L) 150 - 450 K/uL    MPV 9.2 9.2 - 12.9 fL    Immature Granulocytes 0.5 0.0 - 0.5 %    Gran # (ANC) 4.3 1.8 - 7.7 K/uL    Immature Grans (Abs) 0.03 0.00 - 0.04 K/uL    Lymph # 1.1 1.0 - 4.8 K/uL    Mono # 0.6 0.3 - 1.0 K/uL    Eos # 0.1 0.0 - 0.5 K/uL    Baso # 0.03 0.00 - 0.20 K/uL    nRBC 0 0 /100 WBC    Gran % 70.1 38.0 - 73.0 %    Lymph % 17.0 (L) 18.0 - 48.0 %    Mono % 10.4 4.0 - 15.0 %    Eosinophil % 1.5 0.0 - 8.0 %    Basophil % 0.5 0.0 - 1.9 %    Differential Method Automated    Comprehensive metabolic panel    Collection Time: 06/20/24  3:18 AM   Result Value Ref Range    Sodium 132 (L) 136 - 145 mmol/L    Potassium 4.0 3.5 - 5.1 mmol/L    Chloride 96 95 - 110 mmol/L    CO2 26 23 - 29 mmol/L    Glucose 103 70 - 110 mg/dL    BUN 13 6 - 20 mg/dL    Creatinine 0.9 0.5 - 1.4 mg/dL    Calcium 9.0 8.7 - 10.5 mg/dL    Total Protein 6.4 6.0 - 8.4 g/dL    Albumin 3.5 3.5 - 5.2 g/dL    Total Bilirubin 0.7 0.1 - 1.0 mg/dL    Alkaline Phosphatase 65 55 - 135 U/L    AST 64 (H) 10 - 40 U/L    ALT 67 (H) 10 - 44 U/L    eGFR >60 >60 mL/min/1.73 m^2    Anion Gap 10 8 - 16 mmol/L      No results found for: "PHENYTOIN", "PHENOBARB", "VALPROATE", "CBMZ"      EXAMINATION    VITALS   Vitals:    06/20/24 0406 06/20/24 0450 06/20/24 0803 06/20/24 0804   BP:  130/85 (!) 157/93    BP Location:  Right arm Left arm    Patient Position:  Lying Lying    Pulse: 68 61 68    Resp:  20 18    Temp:  97.5 °F (36.4 °C) 97.4 °F (36.3 °C)    TempSrc:  Oral Oral    SpO2:  97% 100% 100%   Weight:       Height:            CONSTITUTIONAL  General Appearance: NAD, unremarkable, age appropriate, lying in bed, overweight, calm    MUSCULOSKELETAL  Muscle Strength and Tone: WNL    Abnormal Involuntary Movements: none observed ; non-tremulous this morning   Gait and Station: WNL; " "non-ataxic     PSYCHIATRIC   Behavior/Cooperation:  friendly and cooperative, eye contact normal, calm  Speech:  normal tone, normal rate, normal pitch, normal volume  Language: grossly intact, able to name, able to repeat with spontaneous speech  Mood: "pretty good ; a little anxious"  Affect:  congruent with mood and full / reactive   Associations: intact; no NARCISO  Thought Process: Linear and Future-Oriented / Goal-Directed toward sobriety   Thought Content: denies SI, HI, AH, VH, TH, delusions, or paranoia (no RIS observed)   Sensorium: Awake  Alert and Oriented: to person, place, city, state, month, year, and situation   Memory: 3/3 immediate, 3/3 at 5 minutes    Recent: Intact; able to report recent events   Remote: Intact; Named 4/4 past presidents   Attention/concentration: Intact. Appropriate for age/education. Able to spell w-o-r-l-d & d-l-r-o-w.   Similarities: Intact (difference between apple and orange?)  Abstract reasoning: Intact  Fund of Knowledge: Named 4/4 past presidents   Insight: Intact  Judgment: Intact    CAM ICU Delirium Assessment - NEGATIVE    Is the patient aware of the biomedical complications associated with substance abuse and mental illness? yes      MEDICAL DECISION MAKING    ASSESSMENT        Alcohol Withdrawal Syndrome with Unspecified Complication   Alcohol Use Disorder, Severe, Dependence   Generalized Anxiety Disorder   Unspecified Insomnia        RECOMMENDATIONS       - With reasonable medical certainty, based on a present-state examination and information from the patient's wife, the patient does not currently meet PEC criteria due to not being an imminent threat to self/others and not being gravely disabled 2/2 mental illness at this time.      - Begin Zoloft 50 mg PO daily for LEE ANN (monitor sodium to ensure that it continues to move toward normal range) (discussed risks/benefits/alt vs no treatment with patient).    - Begin Melatonin 6 mg PO QHS for sleep-wake cycle regulation " "(discussed risks/benefits/alt vs no treatment with patient).    - Alcohol Withdrawal Protocol: (in addition to scheduled Valium taper per Primary Team)  - Frequent assessment with the CIWA-Ar is the standard of care and the hallmark of clinical practice  - Clinical [place] Withdrawal Assessment of Alcohol Scale (CIWA-Ar)  If CIWA is <8 and vital signs are stable, no PRN medication is required. Repeat vital signs q4 and the CIWA q8.  If CIWA is between 8 and 15, give Lorazepam 2 mg PO/IM q4 PRN and complete vital signs q2 and the CIWA q4.  If CIWA is ?15 or DBP ?110 mmHg, give Lorazepam 2 mg PO/IM q1 until patient has a CIWA of <15 or DBP <110 mmHg. CIWA and vital signs checked q1 until patients CIWA is <15 and DBP <110 mmHg  Call MD for for SBP >180, DBP >120, or HR >110 or if patient requires ?6 mg of lorazepam in 3 hours.  - Vitals q4hr; Ativan 2mg PO/IM q4hr PRN, for CIWA >8 or if 2 criteria are met: Systolic BP>160, Diastolic BP >100 or HR >110  - Vitamin supplementation - Thiamine 100 mg daily, Folate 1 mg daily, & MVI daily     - Psychotherapy was performed with patient as noted above with a focus on improving anxiety, sleep, and alcohol cessation / total sobriety.    - Patient's most recent resulted labs, imaging, and EKG were reviewed today ; Ethanol was 411 at admission ; UDS negative ; CT Head with "1. No acute intracranial abnormality. 2. No CT evidence of cervical spine acute osseous traumatic injury. 3. Additional findings as further detailed above."    - Despite motivational interviewing, counseling, and education, the patient denies any interest in residential rehab, IOPs, 12 steps meetings, or MAT at this time.  He does endorses a goal of total sobriety via outpatient care at this time.     - Please [facility]/p discharge from this facility (placed a resource packet in his d/c instructions tab to be printed for " patient at d/c ; discussed this packet with the patient today).     - Patient was instructed to call 911 and/or [phone] and return to the nearest ED if he begins feeling suicidal, homicidal, or gravely disabled (for s/p discharge from this facility).       - Thank you for this consult ; will continue to follow patient while at [facility]         Total time spent with patient and/or managing/coordinating patient's care today (excluding the time spent on psychotherapy): 60 minutes   Time spent on psychotherapy today (as noted above): 20 minutes   Total time for encounter today including psychotherapy: 80 minutes      More than 50% of the time was spent counseling/coordinating care.     Consulting clinician was informed of the encounter and consult note.       STAFF:  Fabricio Brown MD  [facility]   6/20/2024

## 2024-06-20 NOTE — PROGRESS NOTES
Virtual Nurse:Discharge orders noted; additional clinical references attached.  and pharmacy tech notified.  Patient's discharge instruction packet given by bedside RN.    Cued into patient's room.  Permission received per patient to turn camera to view patient.  Introduced as VN that will be instructing on discharge instructions. Educated patient on reason for admission; medications to hold, continue, and start, appointment to follow-up with doctor, and when to return to ED. Teach back method used. Verbalized understanding  Number given for 24/7 Nurse Line. Opportunity given for questions and questions answered.  Bedside nurse updated.        06/20/24 1349   Shift   Virtual Nurse - Patient Verbalized Approval Of Camera Use;VN Rounding   Type of Frequent Check   Type Patient Rounds   Safety/Activity   Patient Rounds visualized patient   Activity Assistance Provided independent

## 2024-06-21 ENCOUNTER — PATIENT OUTREACH (OUTPATIENT)
Dept: ADMINISTRATIVE | Facility: CLINIC | Age: 59
End: 2024-06-21
Payer: COMMERCIAL

## 2024-06-21 LAB
OHS QRS DURATION: 82 MS
OHS QTC CALCULATION: 458 MS

## 2024-06-21 NOTE — PROGRESS NOTES
C3 nurse spoke with Silverio Devlin (Spouse, Janae) for a TCC post hospital discharge follow up call. The patient has a scheduled HOSFU appointment with Lloyd Armando MD on 06/26/2024 @ 4078.    The patient's spouse expresses she is unfamiliar w/ patient's new medications and requests a call be made directly to patient.

## 2024-06-21 NOTE — PROGRESS NOTES
C3 nurse spoke with Silverio Devlin for a TCC post hospital discharge follow up call to review medications only. The patient has a scheduled [facility] appointment with Lloyd Armando MD on 06/26/2024 @ [id number].

## 2024-06-26 ENCOUNTER — OFFICE VISIT (OUTPATIENT)
Dept: INTERNAL MEDICINE | Facility: CLINIC | Age: 59
End: 2024-06-26
Payer: COMMERCIAL

## 2024-06-26 VITALS
RESPIRATION RATE: 16 BRPM | OXYGEN SATURATION: 96 % | HEART RATE: 81 BPM | DIASTOLIC BLOOD PRESSURE: 80 MMHG | WEIGHT: 239.88 LBS | SYSTOLIC BLOOD PRESSURE: 130 MMHG | BODY MASS INDEX: 35.53 KG/M2 | TEMPERATURE: 97 F | HEIGHT: 69 IN

## 2024-06-26 DIAGNOSIS — I48.91 ATRIAL FIBRILLATION WITH RVR: ICD-10-CM

## 2024-06-26 DIAGNOSIS — F10.930 ALCOHOL WITHDRAWAL SYNDROME WITHOUT COMPLICATION: ICD-10-CM

## 2024-06-26 DIAGNOSIS — Z09 HOSPITAL DISCHARGE FOLLOW-UP: Primary | ICD-10-CM

## 2024-06-26 DIAGNOSIS — E66.01 SEVERE OBESITY (BMI 35.0-39.9) WITH COMORBIDITY: ICD-10-CM

## 2024-06-26 DIAGNOSIS — D69.6 THROMBOCYTOPENIA, UNSPECIFIED: ICD-10-CM

## 2024-06-26 DIAGNOSIS — G47.33 OSA (OBSTRUCTIVE SLEEP APNEA): ICD-10-CM

## 2024-06-26 PROCEDURE — 99999 PR PBB SHADOW E&M-EST. PATIENT-LVL V: CPT | Mod: PBBFAC,,, | Performed by: FAMILY MEDICINE

## 2024-06-26 NOTE — PROGRESS NOTES
Transitional Care Note  Subjective:       Patient ID: Silverio Devlin is a 59 y.o. male.  Chief Complaint: Hospital Follow Up    Family and/or Caretaker present at visit?  No.  Diagnostic tests reviewed/disposition: I have reviewed all completed as well as pending diagnostic tests at the time of discharge.  Disease/illness education:  Atrial fibrillation, sleep apnea, alcohol withdrawal  Home health/community services discussion/referrals: Patient does not have home health established from hospital visit.  They do not need home health.  If needed, we will set up home health for the patient.   Establishment or re-establishment of referral orders for community resources: No other necessary community resources.   Discussion with other health care providers: No discussion with other health care providers necessary.   HPI 59-year-old white male with hypertension, anxiety, and sleep apnea presents to clinic today for hospital follow-up secondary to an admission on June 17, 2024 and discharged on June 20, 2024 secondary to alcohol withdrawal and atrial fibrillation.  The patient reported excessive stress secondary to the recent death of his mother.  He reports that he turned to alcohol for treatment of his stress.  Workup revealed atrial fibrillation, elevated liver enzymes, elevated CPK level, and an elevated alcohol level.  Patient was admitted to the ICU for treatment and placed on a Valium taper and amiodarone drip.  The patient was eventually able to be transitioned to a beta-blocker with no recurrence.  He continues to note anxiety but denies any further alcohol cravings or alcohol use.  He does note continued difficulty sleeping but was noted to have severe sleep apnea but never followed through with CPAP titration.  He reports difficulty tolerating CPAP secondary to claustrophobia.  I have encouraged the patient to follow-up with Sleep Medicine to discuss further sleep apnea treatment options.  Review of Systems    Constitutional:  Negative for appetite change, chills, fatigue and fever.   HENT:  Negative for congestion, ear pain, hearing loss, postnasal drip, rhinorrhea, sinus pressure, sore throat and tinnitus.    Eyes:  Negative for redness, itching and visual disturbance.   Respiratory:  Negative for cough, chest tightness and shortness of breath.    Cardiovascular:  Negative for chest pain and palpitations.   Gastrointestinal:  Negative for abdominal pain, constipation, diarrhea, nausea and vomiting.   Genitourinary:  Negative for decreased urine volume, difficulty urinating, dysuria, frequency, hematuria and urgency.   Musculoskeletal:  Negative for back pain, myalgias, neck pain and neck stiffness.   Skin:  Negative for rash.   Neurological:  Negative for dizziness, light-headedness and headaches.   Psychiatric/Behavioral:  Positive for sleep disturbance.        Objective:      Physical Exam  Vitals and nursing note reviewed.   Constitutional:       General: He is not in acute distress.     Appearance: He is well-developed. He is obese. He is not diaphoretic.   HENT:      Head: Normocephalic and atraumatic.      Right Ear: External ear normal.      Left Ear: External ear normal.      Nose: Nose normal.      Mouth/Throat:      Pharynx: No oropharyngeal exudate.   Eyes:      General: No scleral icterus.        Right eye: No discharge.         Left eye: No discharge.      Conjunctiva/sclera: Conjunctivae normal.      Pupils: Pupils are equal, round, and reactive to light.   Neck:      Thyroid: No thyromegaly.      Vascular: No JVD.      Trachea: No tracheal deviation.   Cardiovascular:      Rate and Rhythm: Normal rate and regular rhythm.      Heart sounds: Normal heart sounds. No murmur heard.     No friction rub. No gallop.   Pulmonary:      Effort: Pulmonary effort is normal. No respiratory distress.      Breath sounds: Normal breath sounds. No stridor. No wheezing, rhonchi or rales.   Chest:      Chest wall: No  tenderness.   Abdominal:      General: Bowel sounds are normal. There is no distension.      Palpations: Abdomen is soft. There is no mass.      Tenderness: There is no abdominal tenderness. There is no guarding or rebound.   Musculoskeletal:         General: No tenderness. Normal range of motion.      Cervical back: Normal range of motion and neck supple.   Lymphadenopathy:      Cervical: No cervical adenopathy.   Skin:     General: Skin is warm and dry.      Coloration: Skin is not pale.      Findings: No erythema or rash.   Neurological:      Mental Status: He is alert and oriented to person, place, and time.   Psychiatric:         Mood and Affect: Mood normal.         Behavior: Behavior normal.         Thought Content: Thought content normal.         Judgment: Judgment normal.         Assessment:       1. Hospital discharge follow-up    2. Atrial fibrillation with RVR    3. Alcohol withdrawal syndrome without complication    4. Thrombocytopenia, unspecified    5. FRANKO (obstructive sleep apnea)    6. Severe obesity (BMI 35.0-39.9) with comorbidity        Plan:         1. Hospital records have been reviewed.    2. Continue aspirin 325 mg daily, amlodipine 5 mg daily, losartan 25 mg daily, and metoprolol 25 mg daily.  Atrial fibrillation is well controlled with no recurrence.  Follow-up with cardiology as scheduled.    3. Continue Zoloft 50 mg daily, thiamine 100 mg daily, folic acid 1 mg daily, and gabapentin 300 mg t.i.d..  Patient denies any further alcohol use and remains stable.  Follow-up with Psychiatry as needed.    4. Thrombocytopenia stable.  5. Refer to Sleep Medicine for further discussion of treatment for sleep apnea.    6. Encourage diet and exercise.  Patient has lost 7 lb since last year.    7. Return to clinic as needed or in 5 months for annual physical exam.

## 2024-08-20 DIAGNOSIS — K21.9 GASTROESOPHAGEAL REFLUX DISEASE, UNSPECIFIED WHETHER ESOPHAGITIS PRESENT: ICD-10-CM

## 2024-08-20 DIAGNOSIS — I10 PRIMARY HYPERTENSION: ICD-10-CM

## 2024-08-20 RX ORDER — LOSARTAN POTASSIUM 25 MG/1
25 TABLET ORAL
Qty: 90 TABLET | Refills: 3 | Status: SHIPPED | OUTPATIENT
Start: 2024-08-20

## 2024-08-20 RX ORDER — OMEPRAZOLE 20 MG/1
20 CAPSULE, DELAYED RELEASE ORAL
Qty: 90 CAPSULE | Refills: 3 | Status: SHIPPED | OUTPATIENT
Start: 2024-08-20

## 2024-08-20 NOTE — TELEPHONE ENCOUNTER
No care due was identified.  [facility] Embedded Care Due Messages. Reference number: [id number].   8/20/2024 12:17:26 AM CDT

## 2024-08-21 NOTE — TELEPHONE ENCOUNTER
Refill Decision Note   Silverio Quita  is requesting a refill authorization.  Brief Assessment and Rationale for Refill:  Approve     Medication Therapy Plan:         Comments:     Note composed:8:55 PM 08/20/2024

## 2024-09-13 ENCOUNTER — TELEPHONE (OUTPATIENT)
Dept: SLEEP MEDICINE | Facility: CLINIC | Age: 59
End: 2024-09-13
Payer: COMMERCIAL

## 2024-09-18 ENCOUNTER — OFFICE VISIT (OUTPATIENT)
Dept: SLEEP MEDICINE | Facility: CLINIC | Age: 59
End: 2024-09-18
Payer: COMMERCIAL

## 2024-09-18 VITALS
BODY MASS INDEX: 35.27 KG/M2 | HEIGHT: 69 IN | DIASTOLIC BLOOD PRESSURE: 77 MMHG | SYSTOLIC BLOOD PRESSURE: 111 MMHG | HEART RATE: 60 BPM | WEIGHT: 238.13 LBS

## 2024-09-18 DIAGNOSIS — G47.33 OSA (OBSTRUCTIVE SLEEP APNEA): ICD-10-CM

## 2024-09-18 PROCEDURE — 3078F DIAST BP <80 MM HG: CPT | Mod: CPTII,S$GLB,, | Performed by: NURSE PRACTITIONER

## 2024-09-18 PROCEDURE — 4010F ACE/ARB THERAPY RXD/TAKEN: CPT | Mod: CPTII,S$GLB,, | Performed by: NURSE PRACTITIONER

## 2024-09-18 PROCEDURE — 99214 OFFICE O/P EST MOD 30 MIN: CPT | Mod: S$GLB,,, | Performed by: NURSE PRACTITIONER

## 2024-09-18 PROCEDURE — 3074F SYST BP LT 130 MM HG: CPT | Mod: CPTII,S$GLB,, | Performed by: NURSE PRACTITIONER

## 2024-09-18 PROCEDURE — 1159F MED LIST DOCD IN RCRD: CPT | Mod: CPTII,S$GLB,, | Performed by: NURSE PRACTITIONER

## 2024-09-18 PROCEDURE — 3008F BODY MASS INDEX DOCD: CPT | Mod: CPTII,S$GLB,, | Performed by: NURSE PRACTITIONER

## 2024-09-18 PROCEDURE — 99999 PR PBB SHADOW E&M-EST. PATIENT-LVL IV: CPT | Mod: PBBFAC,,, | Performed by: NURSE PRACTITIONER

## 2024-09-18 NOTE — PROGRESS NOTES
"Cc: FRANKO, new to me    3 night HST 7/2022 revealing severe FRANKO. Didn't see his sleep study report and his provider left [facility]. Ongoing snoring (less though since having ENT surgery 11/2022), witnessed apneic pauses, disrupted sleep/nocturia 4x, waking up exhausted and excessive daytime sleepiness. He can doze at his desk. ESS= 12-14c. Nose mask/cpap used while hospitalized and he tolerated it well/helped.     /77 (BP Location: Left arm, Patient Position: Sitting, BP Method: Medium (Automatic))   Pulse 60   Ht 5' 9" (1.753 m)   Wt 108 kg (238 lb 1.6 oz)   BMI 35.16 kg/m²     7/2022 AHI 60/percent time below 90% SpO2: 30.5%, Min SpO2: 56.8%    Assessment:  FRANKO, severe, ready to begin PAP  Medical comorbidity HTN        Plan:  APAP setup expedite 5-16cm. RTC 5 wks after setup adherence monitoring  Discussed etiology of FRANKO and potential ramifications of untreated FRANKO, including hypertension.     "

## 2024-11-19 ENCOUNTER — PATIENT MESSAGE (OUTPATIENT)
Dept: ADMINISTRATIVE | Facility: HOSPITAL | Age: 59
End: 2024-11-19
Payer: COMMERCIAL

## 2024-12-03 ENCOUNTER — PATIENT OUTREACH (OUTPATIENT)
Dept: ADMINISTRATIVE | Facility: HOSPITAL | Age: 59
End: 2024-12-03
Payer: COMMERCIAL

## 2024-12-03 DIAGNOSIS — Z12.11 COLON CANCER SCREENING: Primary | ICD-10-CM

## 2025-01-09 ENCOUNTER — OFFICE VISIT (OUTPATIENT)
Dept: SLEEP MEDICINE | Facility: CLINIC | Age: 60
End: 2025-01-09
Attending: PSYCHIATRY & NEUROLOGY
Payer: COMMERCIAL

## 2025-01-09 DIAGNOSIS — G47.33 OSA (OBSTRUCTIVE SLEEP APNEA): Primary | ICD-10-CM

## 2025-01-09 PROCEDURE — 99214 OFFICE O/P EST MOD 30 MIN: CPT | Mod: S$GLB,,, | Performed by: NURSE PRACTITIONER

## 2025-01-09 PROCEDURE — 99999 PR PBB SHADOW E&M-EST. PATIENT-LVL I: CPT | Mod: PBBFAC,,, | Performed by: NURSE PRACTITIONER

## 2025-01-09 NOTE — PROGRESS NOTES
Cc: FRANKO    He began apap 5-16cm 9/2024 and is doing well. Using ?F30 mask but ill fitting/leaks. Headaches have resolved and sleep is now more restful and he reports less daytime sleepiness. Not gotten any more mask/supplies in Dec yet. Dry mouth. ESS=5    Interrogation 30davg 7.3h/n AHI 4.2, 90% tile 13.8cm  Hx ENT surgery 2022 7/2022 AHI 60/percent time below 90% SpO2: 30.5%, Min SpO2: 56.8%    Assessment:  FRANKO, severe, excellent adherence with PAP, benefiting. AHI 5-10  Medical comorbidity HTN, paroxysmal atrial fibrillation        Plan:  APAP 5-16cm continue, consider F20 mask and/or adjust current mask cushion over bottom of nose.  DMe THS supplies, adjust humidity prn. Please notify me if AHI consistently >10  Discussed control of FRANKO  Rtc annually, sooner if needed  Sent message to PCP/cards about refilling his beta blocker if appropriate

## 2025-01-10 VITALS
HEIGHT: 69 IN | BODY MASS INDEX: 37.59 KG/M2 | HEART RATE: 62 BPM | WEIGHT: 253.81 LBS | SYSTOLIC BLOOD PRESSURE: 138 MMHG | DIASTOLIC BLOOD PRESSURE: 93 MMHG

## 2025-01-10 DIAGNOSIS — I48.91 ATRIAL FIBRILLATION WITH RVR: Primary | ICD-10-CM

## 2025-01-10 RX ORDER — METOPROLOL SUCCINATE 25 MG/1
25 TABLET, EXTENDED RELEASE ORAL DAILY
Qty: 90 TABLET | Refills: 3 | Status: SHIPPED | OUTPATIENT
Start: 2025-01-10 | End: 2026-01-10

## 2025-01-11 DIAGNOSIS — I48.91 ATRIAL FIBRILLATION WITH RVR: ICD-10-CM

## 2025-01-11 RX ORDER — METOPROLOL SUCCINATE 25 MG/1
25 TABLET, EXTENDED RELEASE ORAL DAILY
Qty: 90 TABLET | Refills: 3 | Status: CANCELLED | OUTPATIENT
Start: 2025-01-11 | End: 2026-01-11

## 2025-01-11 NOTE — TELEPHONE ENCOUNTER
No care due was identified.  [facility] Embedded Care Due Messages. Reference number: [id number].   1/11/2025 10:33:38 AM CST

## 2025-01-13 RX ORDER — HYDROCORTISONE 25 MG/G
CREAM TOPICAL 2 TIMES DAILY
Qty: 28 G | Refills: 3 | Status: SHIPPED | OUTPATIENT
Start: 2025-01-13

## 2025-01-14 ENCOUNTER — PATIENT MESSAGE (OUTPATIENT)
Dept: SLEEP MEDICINE | Facility: CLINIC | Age: 60
End: 2025-01-14
Payer: COMMERCIAL

## 2025-02-14 ENCOUNTER — CLINICAL SUPPORT (OUTPATIENT)
Dept: ENDOSCOPY | Facility: HOSPITAL | Age: 60
End: 2025-02-14
Attending: FAMILY MEDICINE
Payer: COMMERCIAL

## 2025-02-14 ENCOUNTER — TELEPHONE (OUTPATIENT)
Dept: ENDOSCOPY | Facility: HOSPITAL | Age: 60
End: 2025-02-14

## 2025-02-14 VITALS — WEIGHT: 240 LBS | HEIGHT: 69 IN | BODY MASS INDEX: 35.55 KG/M2

## 2025-02-14 DIAGNOSIS — Z12.11 COLON CANCER SCREENING: ICD-10-CM

## 2025-02-14 RX ORDER — SODIUM, POTASSIUM,MAG SULFATES 17.5-3.13G
1 SOLUTION, RECONSTITUTED, ORAL ORAL DAILY
Qty: 1 KIT | Refills: 0 | Status: SHIPPED | OUTPATIENT
Start: 2025-02-14 | End: 2025-02-16

## 2025-02-14 NOTE — TELEPHONE ENCOUNTER
Referral for procedure from PAT appointment      Spoke to patient to schedule procedure(s) Colonoscopy       Physician to perform procedure(s) Dr. JF Jay  Date of Procedure (s) 02/27/25  Arrival Time 12:00 PM  Time of Procedure(s) 1:00 PM   Location of Procedure(s) [place] [address]nd Floor  Type of Rx Prep sent to patient: Suprep  Instructions provided to patient via [facility]    Patient was informed on the following information and verbalized understanding. Screening questionnaire reviewed with patient and complete. If procedure requires anesthesia, a responsible adult needs to be present to accompany the patient home, patient cannot drive after receiving anesthesia. Appointment details are tentative, especially check-in time. Patient will receive a prep-op call 7 days prior to confirm check-in time for procedure. If applicable the patient should contact their pharmacy to verify Rx for procedure prep is ready for pick-up. Patient was advised to call the scheduling department at [phone] if pharmacy states no Rx is available. Patient was advised to call the endoscopy scheduling department if any questions or concerns arise.      [facility] Scheduling Department

## 2025-02-19 ENCOUNTER — ANESTHESIA EVENT (OUTPATIENT)
Dept: ENDOSCOPY | Facility: HOSPITAL | Age: 60
End: 2025-02-19
Payer: COMMERCIAL

## 2025-02-19 NOTE — ANESTHESIA PREPROCEDURE EVALUATION
02/19/2025  Silverio Devlin is a 59 y.o., male.    [facility]  Anesthesia Pre-Operative Evaluation       Patient Name: Silverio Devlin  YOB: 1965  MRN: [id number]  [facility]: [id number]      Code Status: Prior   Date of Procedure: 2/27/2025  Anesthesia: Choice Procedure: Procedure(s) (LRB):  COLONOSCOPY, SCREENING, HIGH RISK PATIENT (N/A)  Pre-Operative Diagnosis: Colon cancer screening [Z12.11]  Proceduralist: Surgeons and Role:     * Kvng Jay MD - Primary        SUBJECTIVE:   Silverio Devlin is a 59 y.o. male who  has a past medical history of Headache, Heartburn, and FRANKO (obstructive sleep apnea) (6/26/2024)..     he has a current medication list which includes the following long-term medication(s): amlodipine, aspirin, azelastine, gabapentin, losartan, metoprolol succinate, omeprazole, and sertraline.     ALLERGIES:   Review of patient's allergies indicates:  No Known Allergies  LDA:          Lines/Drains/Airways       None                  Anesthesia Evaluation      Airway   Mallampati: II  TM distance: Normal  Dental      Pulmonary    (+) sleep apnea  Cardiovascular     Rate: Normal    Neuro/Psych    (+) headaches    GI/Hepatic/Renal      Endo/Other    Abdominal                   MEDICATIONS:     Antibiotics (From admission, onward)      None          VTE Risk Mitigation (From admission, onward)      None              Current Medications[1]       History:   There are no hospital problems to display for this patient.    Surgical History:    has a past surgical history that includes Nasal stenosis repair (N/A, 11/7/2022); Nasal septoplasty (N/A, 11/7/2022); Esophagogastroduodenoscopy (N/A, 2/14/2024); and Colonoscopy (N/A, 2/14/2024).   Social History:    has no history on file for sexual activity.  reports that he has been smoking. He has never been exposed to tobacco  smoke. He has never used smokeless tobacco. He reports that he does not currently use alcohol. He reports current drug use. Drug: Marijuana.     OBJECTIVE:     Vital Signs (Most Recent):    Vital Signs Range (Last 24H):          There is no height or weight on file to calculate BMI.   Wt Readings from Last 4 Encounters:   02/14/25 108.9 kg (240 lb)   01/10/25 115.1 kg (253 lb 12.8 oz)   09/18/24 108 kg (238 lb 1.6 oz)   06/26/24 108.8 kg (239 lb 13.8 oz)       Significant Labs:  Lab Results   Component Value Date    WBC 6.17 06/20/2024    HGB 15.3 06/20/2024    HCT 42.9 06/20/2024     (L) 06/20/2024     (L) 06/20/2024    K 4.0 06/20/2024    CL 96 06/20/2024    CREATININE 0.9 06/20/2024    BUN 13 06/20/2024    CO2 26 06/20/2024     06/20/2024    CALCIUM 9.0 06/20/2024    MG 2.2 06/17/2024    PHOS 4.0 11/09/2010    ALKPHOS 65 06/20/2024    ALT 67 (H) 06/20/2024    AST 64 (H) 06/20/2024    ALBUMIN 3.5 06/20/2024    INR 1.2 11/08/2010    APTT 24.3 11/08/2010    HGBA1C 5.2 11/06/2023     (H) 06/17/2024    CPKMB 4.8 11/08/2010    TROPONINI <0.006 06/18/2024    MB 1.3 11/08/2010    BNP 27 06/17/2024     No LMP for male patient.  No results found for this or any previous visit (from the past 72 hours).    EKG:   Results for orders placed or performed during the hospital encounter of 06/17/24   EKG 12-lead    Collection Time: 06/19/24  7:23 AM   Result Value Ref Range    QRS Duration 88 ms    [facility] QTC Calculation 448 ms    Narrative    Test Reason : I48.91,    Vent. Rate : 062 BPM     Atrial Rate : 062 BPM     P-R Int : 182 ms          QRS Dur : 088 ms      QT Int : 442 ms       P-R-T Axes : 050 026 031 degrees     QTc Int : 448 ms    Normal sinus rhythm  Normal ECG  When compared with ECG of 18-JUN-2024 12:48,  Sinus rhythm has replaced Atrial fibrillation  Vent. rate has decreased  BPM  ST no longer depressed in Inferior leads  ST no longer depressed in Anterior-lateral leads  Confirmed by  Aman Leyva MD, Jose ([id number]) on 6/19/2024 11:48:04 PM    Referred By: [email or web address]   SELF           Confirmed By:Donaldo Leyva       TTE:  Results for orders placed or performed during the hospital encounter of 06/17/24   Echo   Result Value Ref Range    LVOT stroke volume 75.86 cm3    LVIDd 4.96 3.5 - 6.0 cm    LV Systolic Volume 48.56 mL    LVIDs 3.43 2.1 - 4.0 cm    LV Diastolic Volume 115.81 mL    IVS 1.17 (A) 0.6 - 1.1 cm    LVOT diameter 2.13 cm    LVOT area 3.6 cm2    FS 31 28 - 44 %    Left Ventricle Relative Wall Thickness 0.52 cm    PW 1.28 (A) 0.6 - 1.1 cm    LV mass 237.59 g    MV Peak E Raúl 0.76 m/s    TDI LATERAL 0.11 m/s    TDI SEPTAL 0.10 m/s    E/E' ratio 7.24 m/s    MV Peak A Raúl 0.55 m/s    E/A ratio 1.38     IVRT 119.89 msec    E wave deceleration time 142.34 msec    LV SEPTAL E/E' RATIO 7.60 m/s    LV LATERAL E/E' RATIO 6.91 m/s    LA Vol 64.16 cm3    LVOT peak raúl 1.27 m/s    RV S' 16.85 cm/s    TAPSE 2.14 cm    RV/LV Ratio 0.61 cm    LA size 3.91 cm    Left Atrium Minor Axis 5.66 cm    Left Atrium Major Axis 5.32 cm    LA Vol (MOD) 56.97 cm3    LA WIDTH 3.52 cm    RA Major Axis 4.65 cm    AV mean gradient 4 mmHg    AV peak gradient 7 mmHg    Ao peak raúl 1.33 m/s    Ao VTI 25.90 cm    LVOT peak VTI 21.30 cm    AV valve area 2.93 cm²    AV Velocity Ratio 0.95     AV index (prosthetic) 0.82     JACINTO by Velocity Ratio 3.40 cm²    MV stenosis pressure 1/2 time 41.28 ms    MV valve area p 1/2 method 5.33 cm2    STJ 2.70 cm    Ascending aorta 3.58 cm    Mean e' 0.11 m/s    RVDD 3.03 cm    BSA 2.33 m2    LV Systolic Volume Index 21.6 mL/m2    LV Diastolic Volume Index 51.47 mL/m2    LV Mass Index 106 g/m2    REAL 28.5 mL/m2    REAL (MOD) 25.3 mL/m2    ZLVIDS -2.88     ZLVIDD -4.97     Vasquez's Biplane MOD Ejection Fraction 63 %    A2C EF 67 %    A4C EF 60 %    LV ESV A2C 63.32 mL    LV ESV A4C 50.05 mL    LV EDV A2C [id number] mL    LV EDV A4C 65.48 mL    Left Ventricular End Systolic  "Volume by Teichholz Method 48.56 mL    Left Ventricular End Diastolic Volume by Teichholz Method 115.81 mL    Left Ventricular Outflow Tract Mean Velocity 0.89 cm/s    Left Ventricular Outflow Tract Mean Gradient 3.45 mmHg    RA Width 3.28 cm    Sinus 3.59 cm    LA area A4C 19.11 cm2    LA area A2C 21.91 cm2    Est. RA pres 3 mmHg    Narrative      Left Ventricle: The left ventricle is normal in size. Normal wall   thickness. There is concentric remodeling. There is normal systolic   function. Biplane (2D) method of discs ejection fraction is 63%. There is   normal diastolic function.    Right Ventricle: Normal right ventricular cavity size. Wall thickness   is normal. Systolic function is normal.    IVC/SVC: Normal venous pressure at 3 mmHg.       No results found for: "EF"   No results found for this or any previous visit.  PATTI:  No results found for this or any previous visit.  Stress Test:  No results found for this or any previous visit.     LHC:  No results found for this or any previous visit.     PFT:  No results found for: "FEV1", "FVC", "GVR7AGW", "TLC", "DLCO"     ASSESSMENT/PLAN:       Pre-op Assessment    I have reviewed the Patient Summary Reports.     I have reviewed the Nursing Notes. I have reviewed the NPO Status.   I have reviewed the Medications.     Review of Systems  Anesthesia Hx:  No problems with previous Anesthesia             Denies Family Hx of Anesthesia complications.    Denies Personal Hx of Anesthesia complications.                    Social:  Smoker, Recreational Drugs Marijuana    Tobacco Use    Some Days  Passive Exposure: Never  Smokeless Tobacco: Never used smokeless tobacco.  Comments: 1 cig a day x 30 years  Alcohol Use    Not Currently.  Drug Use    Yes; Marijuana.  Comments: rare          Hematology/Oncology:  Hematology Normal   Oncology Normal                                   EENT/Dental:  EENT/Dental Normal           Cardiovascular:  Cardiovascular Normal               "                                Pulmonary:        Sleep Apnea                Renal/:  Renal/ Normal                 Hepatic/GI:  Hepatic/GI Normal                    Musculoskeletal:  Musculoskeletal Normal                Neurological:      Headaches                                 Endocrine:  Endocrine Normal          Obesity / BMI > 30  Dermatological:  Skin Normal    Psych:  Psychiatric Normal                  Physical Exam  General: Well nourished    Airway:  Mallampati: II   Mouth Opening: Normal  TM Distance: Normal    Chest/Lungs:  Normal Respiratory Rate    Heart:  Rate: Normal    Anesthesia Plan  Type of Anesthesia, risks & benefits discussed:    Anesthesia Type: Gen Natural Airway  Intra-op Monitoring Plan: Standard ASA Monitors  Post Op Pain Control Plan: multimodal analgesia  Induction:  IV  Informed Consent: Informed consent signed with the Patient and all parties understand the risks and agree with anesthesia plan.  All questions answered. Patient consented to blood products? No  ASA Score: 2  Day of Surgery Review of History & Physical: H&P Update referred to the surgeon/provider.    Ready For Surgery From Anesthesia Perspective.     .             [1]  No current facility-administered medications for this encounter.     Current Outpatient Medications   Medication Sig Dispense Refill    amLODIPine (NORVASC) 5 MG tablet Take 1 tablet (5 mg total) by mouth once daily. 90 tablet 3    aspirin 325 MG tablet Take 325 mg by mouth every other day.      azelastine (ASTELIN) 137 mcg (0.1 %) nasal spray 1 spray (137 mcg total) by Nasal route 2 (two) times daily. 30 mL 11    gabapentin (NEURONTIN) 300 MG capsule Take 1 capsule (300 mg total) by mouth every 8 (eight) hours. 90 capsule 11    hydrocortisone (ANUSOL-HC) 2.5 % rectal cream Place rectally 2 (two) times daily. 28 g 3    ibuprofen (ADVIL,MOTRIN) 100 MG tablet Take 100 mg by mouth every 6 (six) hours as needed for Temperature greater than.       losartan (COZAAR) 25 MG tablet TAKE 1 TABLET BY MOUTH EVERY DAY 90 tablet 3    metoprolol succinate (TOPROL-XL) 25 MG 24 hr tablet Take 1 tablet (25 mg total) by mouth once daily. 90 tablet 3    omeprazole (PRILOSEC) 20 MG capsule TAKE 1 CAPSULE BY MOUTH EVERY DAY 90 capsule 3    sertraline (ZOLOFT) 50 MG tablet Take 1 tablet (50 mg total) by mouth once daily. 30 tablet 11

## 2025-02-20 ENCOUNTER — TELEPHONE (OUTPATIENT)
Dept: ENDOSCOPY | Facility: HOSPITAL | Age: 60
End: 2025-02-20

## 2025-02-26 ENCOUNTER — TELEPHONE (OUTPATIENT)
Dept: ENDOSCOPY | Facility: HOSPITAL | Age: 60
End: 2025-02-26
Payer: COMMERCIAL

## 2025-02-27 ENCOUNTER — HOSPITAL ENCOUNTER (OUTPATIENT)
Facility: HOSPITAL | Age: 60
Discharge: HOME | End: 2025-02-27
Attending: INTERNAL MEDICINE | Admitting: INTERNAL MEDICINE
Payer: COMMERCIAL

## 2025-02-27 ENCOUNTER — ANESTHESIA (OUTPATIENT)
Dept: ENDOSCOPY | Facility: HOSPITAL | Age: 60
End: 2025-02-27
Payer: COMMERCIAL

## 2025-02-27 VITALS
SYSTOLIC BLOOD PRESSURE: 120 MMHG | BODY MASS INDEX: 34.07 KG/M2 | HEART RATE: 63 BPM | DIASTOLIC BLOOD PRESSURE: 84 MMHG | TEMPERATURE: 98 F | RESPIRATION RATE: 18 BRPM | WEIGHT: 230 LBS | HEIGHT: 69 IN | OXYGEN SATURATION: 100 %

## 2025-02-27 DIAGNOSIS — Z12.11 ENCOUNTER FOR COLORECTAL CANCER SCREENING: ICD-10-CM

## 2025-02-27 DIAGNOSIS — Z12.12 ENCOUNTER FOR COLORECTAL CANCER SCREENING: ICD-10-CM

## 2025-02-27 DIAGNOSIS — Z86.0109 PERSONAL HISTORY OF OTHER COLON POLYPS: ICD-10-CM

## 2025-02-27 DIAGNOSIS — Z86.0101 PERSONAL HISTORY OF ADENOMATOUS AND SERRATED COLON POLYPS: Primary | ICD-10-CM

## 2025-02-27 PROCEDURE — 94761 N-INVAS EAR/PLS OXIMETRY MLT: CPT

## 2025-02-27 PROCEDURE — 37000009 HC ANESTHESIA EA ADD 15 MINS: Performed by: INTERNAL MEDICINE

## 2025-02-27 PROCEDURE — 63600175 PHARM REV CODE 636 W HCPCS: Performed by: NURSE ANESTHETIST, CERTIFIED REGISTERED

## 2025-02-27 PROCEDURE — 25000003 PHARM REV CODE 250: Performed by: INTERNAL MEDICINE

## 2025-02-27 PROCEDURE — 88305 TISSUE EXAM BY PATHOLOGIST: CPT | Performed by: PATHOLOGY

## 2025-02-27 PROCEDURE — 45385 COLONOSCOPY W/LESION REMOVAL: CPT | Mod: 33,,, | Performed by: INTERNAL MEDICINE

## 2025-02-27 PROCEDURE — 45385 COLONOSCOPY W/LESION REMOVAL: CPT | Mod: 33 | Performed by: INTERNAL MEDICINE

## 2025-02-27 PROCEDURE — 99900035 HC TECH TIME PER 15 MIN (STAT)

## 2025-02-27 PROCEDURE — 27201089 HC SNARE, DISP (ANY): Performed by: INTERNAL MEDICINE

## 2025-02-27 PROCEDURE — 37000008 HC ANESTHESIA 1ST 15 MINUTES: Performed by: INTERNAL MEDICINE

## 2025-02-27 RX ORDER — LIDOCAINE HYDROCHLORIDE 20 MG/ML
INJECTION, SOLUTION EPIDURAL; INFILTRATION; INTRACAUDAL; PERINEURAL
Status: DISCONTINUED | OUTPATIENT
Start: 2025-02-27 | End: 2025-02-27

## 2025-02-27 RX ORDER — PROPOFOL 10 MG/ML
VIAL (ML) INTRAVENOUS
Status: DISCONTINUED | OUTPATIENT
Start: 2025-02-27 | End: 2025-02-27

## 2025-02-27 RX ORDER — SODIUM CHLORIDE 9 MG/ML
INJECTION, SOLUTION INTRAVENOUS CONTINUOUS
Status: DISCONTINUED | OUTPATIENT
Start: 2025-02-27 | End: 2025-02-27 | Stop reason: HOSPADM

## 2025-02-27 RX ADMIN — PROPOFOL 250 MCG/KG/MIN: 10 INJECTION, EMULSION INTRAVENOUS at 01:02

## 2025-02-27 RX ADMIN — SODIUM CHLORIDE: 9 INJECTION, SOLUTION INTRAVENOUS at 12:02

## 2025-02-27 RX ADMIN — LIDOCAINE HYDROCHLORIDE 60 MG: 20 INJECTION, SOLUTION EPIDURAL; INFILTRATION; INTRACAUDAL; PERINEURAL at 01:02

## 2025-02-27 RX ADMIN — PROPOFOL 100 MG: 10 INJECTION, EMULSION INTRAVENOUS at 01:02

## 2025-02-27 NOTE — PROVATION PATIENT INSTRUCTIONS
Discharge Summary/Instructions after an Endoscopic Procedure  Patient Name: Silverio Devlin  Patient MRN: [id number]  Patient YOB: 1965 Thursday, February 27, 2025  Yuridia Mitchell MD  Dear patient,  As a result of recent federal legislation (The Federal Cures Act), you may   receive lab or pathology results from your procedure in your [facility]   account before your physician is able to contact you. Your physician or   their representative will relay the results to you with their   recommendations at their soonest availability.  Thank you,  RESTRICTIONS:  During your procedure today, you received medications for sedation.  These   medications may affect your judgment, balance and coordination.  Therefore,   for 24 hours, you have the following restrictions:   - DO NOT drive a car, operate machinery, make legal/financial decisions,   sign important papers or drink alcohol.    ACTIVITY:  Today: no heavy lifting, straining or running due to procedural   sedation/anesthesia.  The following day: return to full activity including work.  DIET:  Eat and drink normally unless instructed otherwise.     TREATMENT FOR COMMON SIDE EFFECTS:  - Mild abdominal pain, nausea, belching, bloating or excessive gas:  rest,   eat lightly and use a heating pad.  - Sore Throat: treat with throat lozenges and/or gargle with warm salt   water.  - Because air was used during the procedure, expelling large amounts of air   from your rectum or belching is normal.  - If a bowel prep was taken, you may not have a bowel movement for 1-3 days.    This is normal.  SYMPTOMS TO WATCH FOR AND REPORT TO YOUR PHYSICIAN:  1. Abdominal pain or bloating, other than gas cramps.  2. Chest pain.  3. Back pain.  4. Signs of infection such as: chills or fever occurring within 24 hours   after the procedure.  5. Rectal bleeding, which would show as bright red, maroon, or black stools.   (A tablespoon of blood from the rectum is not serious,  especially if   hemorrhoids are present.)  6. Vomiting.  7. Weakness or dizziness.  GO DIRECTLY TO THE NEAREST EMERGENCY ROOM IF YOU HAVE ANY OF THE FOLLOWING:      Difficulty breathing              Chills and/or fever over 101 F   Persistent vomiting and/or vomiting blood   Severe abdominal pain   Severe chest pain   Black, tarry stools   Bleeding- more than one tablespoon   Any other symptom or condition that you feel may need urgent attention  Your doctor recommends these additional instructions:  If any biopsies were taken, your doctors clinic will contact you in 1 to 2   weeks with any results.  - Discharge patient to home (ambulatory).   - Patient has a contact number available for emergencies.  The signs and   symptoms of potential delayed complications were discussed with the   patient.  Return to normal activities tomorrow.  Written discharge   instructions were provided to the patient.   - Resume previous diet.   - Continue present medications.   - Use sugar-free Metamucil one teaspoon PO daily.   - Await pathology results.   - Repeat colonoscopy in 3 years for surveillance.   - Return to primary care physician as previously scheduled.  For questions, problems or results please call your physician - Yuridia Mitchell MD at Work:  [phone].  [facility], EMERGENCY ROOM PHONE NUMBER: [phone]  IF A COMPLICATION OR EMERGENCY SITUATION ARISES AND YOU ARE UNABLE TO REACH   YOUR PHYSICIAN - GO DIRECTLY TO THE EMERGENCY ROOM.  MD Yuridia Sterling MD  2/27/2025 2:19:22 PM  This report has been verified and signed electronically.  Dear patient,  As a result of recent federal legislation (The Federal Cures Act), you may   receive lab or pathology results from your procedure in your [facility]   account before your physician is able to contact you. Your physician or   their representative will relay the results to you with their   recommendations at their  soonest availability.  Thank you,  PROVATION

## 2025-02-27 NOTE — H&P
Short Stay Endoscopy History and Physical    PCP - Lloyd Armando MD  Referring Physician - Lloyd Armando MD  [address] [place] [address]    Procedure - Colonoscopy  ASA - per anesthesia  Mallampati - per anesthesia  History of Anesthesia problems - no  Family history Anesthesia problems -  no   Plan of anesthesia - General    HPI  59 y.o. male  Reason for procedure:   Colon cancer screening [Z12.11]        ROS:  Constitutional: No fevers, chills, No weight loss  CV: No chest pain  Pulm: No cough, No shortness of breath  GI: see HPI    Medical History:  has a past medical history of Headache, Heartburn, and FRANKO (obstructive sleep apnea) (6/26/2024).    Surgical History:  has a past surgical history that includes Nasal stenosis repair (N/A, 11/7/2022); Nasal septoplasty (N/A, 11/7/2022); Esophagogastroduodenoscopy (N/A, 2/14/2024); and Colonoscopy (N/A, 2/14/2024).    Family History: family history includes Dementia in his mother; LUIS disease in his father; Guillain-Langston syndrome in his brother; Hypertension in his brother and father; Thyroid disease in his father..    Social History:  reports that he has been smoking. He has never been exposed to tobacco smoke. He has never used smokeless tobacco. He reports that he does not currently use alcohol. He reports current drug use. Drug: Marijuana.    Review of patient's allergies indicates:  No Known Allergies    Medications:   Prescriptions Prior to Admission[1]    Physical Exam:    Vital Signs:   Vitals:    02/27/25 1213   BP: (!) 140/95   Pulse: 74   Resp: 16   Temp: 98.8 °F (37.1 °C)       General Appearance: Well appearing in no acute distress  Abdomen: Soft, non tender, non distended with normal bowel sounds, no masses    Labs:  Lab Results   Component Value Date    WBC 6.17 06/20/2024    HGB 15.3 06/20/2024    HCT 42.9 06/20/2024     (L) 06/20/2024    CHOL 201 (H) 11/06/2023    TRIG 75 11/06/2023    HDL 48 11/06/2023    ALT  67 (H) 06/20/2024    AST 64 (H) 06/20/2024     (L) 06/20/2024    K 4.0 06/20/2024    CL 96 06/20/2024    CREATININE 0.9 06/20/2024    BUN 13 06/20/2024    CO2 26 06/20/2024    TSH 1.466 06/17/2024    PSA 0.77 09/14/2023    INR 1.2 11/08/2010    HGBA1C 5.2 11/06/2023       I have explained the risks and benefits of this endoscopic procedure to the patient including but not limited to bleeding, inflammation, infection, perforation, and death.      Yuridia Mitchell MD       [1]   Medications Prior to Admission   Medication Sig Dispense Refill Last Dose/Taking    amLODIPine (NORVASC) 5 MG tablet Take 1 tablet (5 mg total) by mouth once daily. 90 tablet 3 2/26/2025    aspirin 325 MG tablet Take 325 mg by mouth every other day.   Past Week    gabapentin (NEURONTIN) 300 MG capsule Take 1 capsule (300 mg total) by mouth every 8 (eight) hours. 90 capsule 11 2/26/2025    ibuprofen (ADVIL,MOTRIN) 100 MG tablet Take 100 mg by mouth every 6 (six) hours as needed for Temperature greater than.   Past Week    losartan (COZAAR) 25 MG tablet TAKE 1 TABLET BY MOUTH EVERY DAY 90 tablet 3 2/26/2025    metoprolol succinate (TOPROL-XL) 25 MG 24 hr tablet Take 1 tablet (25 mg total) by mouth once daily. 90 tablet 3 2/26/2025    omeprazole (PRILOSEC) 20 MG capsule TAKE 1 CAPSULE BY MOUTH EVERY DAY 90 capsule 3 2/24/2025    sertraline (ZOLOFT) 50 MG tablet Take 1 tablet (50 mg total) by mouth once daily. 30 tablet 11 2/26/2025    azelastine (ASTELIN) 137 mcg (0.1 %) nasal spray 1 spray (137 mcg total) by Nasal route 2 (two) times daily. 30 mL 11     hydrocortisone (ANUSOL-HC) 2.5 % rectal cream Place rectally 2 (two) times daily. 28 g 3

## 2025-02-27 NOTE — TRANSFER OF CARE
"Anesthesia Transfer of Care Note    Patient: Silverio Devlin    Procedure(s) Performed: Procedure(s) (LRB):  COLONOSCOPY, SCREENING, HIGH RISK PATIENT (N/A)    Patient location: PACU    Anesthesia Type: general    Transport from OR: Transported from OR on room air with adequate spontaneous ventilation    Post pain: adequate analgesia    Post assessment: no apparent anesthetic complications    Post vital signs: stable    Level of consciousness: awake and alert    Nausea/Vomiting: no nausea/vomiting    Complications: none    Transfer of care protocol was followed      Last vitals: Visit Vitals  /70 (BP Location: Left arm, Patient Position: Lying)   Pulse 76   Temp 36.7 °C (98 °F) (Temporal)   Resp 14   Ht 5' 9" (1.753 m)   Wt 104.3 kg (230 lb)   SpO2 100%   BMI 33.97 kg/m²     "

## 2025-02-27 NOTE — ANESTHESIA POSTPROCEDURE EVALUATION
Anesthesia Post Evaluation    Patient: Silverio Devlin    Procedure(s) Performed: Procedure(s) (LRB):  COLONOSCOPY, SCREENING, HIGH RISK PATIENT (N/A)    Final Anesthesia Type: general      Patient location during evaluation: PACU  Patient participation: Yes- Able to Participate  Level of consciousness: awake and alert  Post-procedure vital signs: reviewed and stable  Pain management: adequate  Airway patency: patent    PONV status at discharge: No PONV  Anesthetic complications: no      Cardiovascular status: stable  Respiratory status: spontaneous ventilation  Hydration status: euvolemic  Follow-up not needed.          Vitals Value Taken Time   /84 02/27/25 14:31   Temp 36.7 °C (98 °F) 02/27/25 14:17   Pulse 62 02/27/25 14:31   Resp 21 02/27/25 14:31   SpO2 98 % 02/27/25 14:31   Vitals shown include unfiled device data.      No case tracking events are documented in the log.      Pain/Lisa Score: Lisa Score: 10 (2/27/2025  2:16 PM)

## 2025-03-04 LAB
FINAL PATHOLOGIC DIAGNOSIS: NORMAL
GROSS: NORMAL
Lab: NORMAL

## 2025-03-05 ENCOUNTER — RESULTS FOLLOW-UP (OUTPATIENT)
Dept: GASTROENTEROLOGY | Facility: CLINIC | Age: 60
End: 2025-03-05

## 2025-06-01 RX ORDER — SERTRALINE HYDROCHLORIDE 50 MG/1
50 TABLET, FILM COATED ORAL DAILY
Qty: 30 TABLET | Refills: 11 | Status: CANCELLED | OUTPATIENT
Start: 2025-06-01 | End: 2026-06-01

## 2025-06-01 RX ORDER — GABAPENTIN 300 MG/1
300 CAPSULE ORAL EVERY 8 HOURS
Qty: 90 CAPSULE | Refills: 11 | Status: CANCELLED | OUTPATIENT
Start: 2025-06-01 | End: 2026-06-01

## 2025-06-02 RX ORDER — GABAPENTIN 300 MG/1
300 CAPSULE ORAL EVERY 8 HOURS
Qty: 90 CAPSULE | Refills: 11 | Status: CANCELLED | OUTPATIENT
Start: 2025-06-01 | End: 2026-06-01

## 2025-06-09 RX ORDER — SERTRALINE HYDROCHLORIDE 50 MG/1
50 TABLET, FILM COATED ORAL DAILY
Qty: 30 TABLET | Refills: 11 | Status: CANCELLED | OUTPATIENT
Start: 2025-06-01 | End: 2026-06-01

## 2025-06-09 RX ORDER — GABAPENTIN 300 MG/1
300 CAPSULE ORAL EVERY 8 HOURS
Qty: 90 CAPSULE | Refills: 11 | Status: CANCELLED | OUTPATIENT
Start: 2025-06-01 | End: 2026-06-01

## 2025-06-17 RX ORDER — SERTRALINE HYDROCHLORIDE 50 MG/1
50 TABLET, FILM COATED ORAL DAILY
Qty: 30 TABLET | Refills: 11 | Status: CANCELLED | OUTPATIENT
Start: 2025-06-01 | End: 2026-06-01

## 2025-06-17 RX ORDER — GABAPENTIN 300 MG/1
300 CAPSULE ORAL EVERY 8 HOURS
Qty: 90 CAPSULE | Refills: 11 | Status: CANCELLED | OUTPATIENT
Start: 2025-06-01 | End: 2026-06-01

## 2025-06-23 ENCOUNTER — PATIENT MESSAGE (OUTPATIENT)
Dept: INTERNAL MEDICINE | Facility: CLINIC | Age: 60
End: 2025-06-23
Payer: COMMERCIAL

## 2025-06-23 ENCOUNTER — TELEPHONE (OUTPATIENT)
Dept: INTERNAL MEDICINE | Facility: CLINIC | Age: 60
End: 2025-06-23
Payer: COMMERCIAL

## 2025-06-23 NOTE — TELEPHONE ENCOUNTER
Patient need to update the system with his new insurance    Was transfer to phone staff  to up date his insurance

## 2025-06-23 NOTE — TELEPHONE ENCOUNTER
Copied from CRM #[id number]. Topic: General Inquiry - Patient Advice  >> Jun 23, 2025 11:50 AM Letha wrote:  .Type:  Needs Medical Advice    Who Called: pt    Would the patient rather a call back or a response via [facility]? Call back  Best Call Back Number: [phone]  Additional Information:      Pt stated he would like a call back as soon as possible regarding his insurance change and medication refills

## 2025-06-24 ENCOUNTER — OFFICE VISIT (OUTPATIENT)
Dept: INTERNAL MEDICINE | Facility: CLINIC | Age: 60
End: 2025-06-24
Payer: COMMERCIAL

## 2025-06-24 VITALS
BODY MASS INDEX: 34.45 KG/M2 | HEIGHT: 69 IN | SYSTOLIC BLOOD PRESSURE: 110 MMHG | OXYGEN SATURATION: 99 % | RESPIRATION RATE: 17 BRPM | HEART RATE: 55 BPM | DIASTOLIC BLOOD PRESSURE: 74 MMHG | WEIGHT: 232.56 LBS | TEMPERATURE: 98 F

## 2025-06-24 DIAGNOSIS — K21.9 GASTROESOPHAGEAL REFLUX DISEASE, UNSPECIFIED WHETHER ESOPHAGITIS PRESENT: ICD-10-CM

## 2025-06-24 DIAGNOSIS — G47.33 OSA (OBSTRUCTIVE SLEEP APNEA): ICD-10-CM

## 2025-06-24 DIAGNOSIS — Z00.00 ANNUAL PHYSICAL EXAM: Primary | ICD-10-CM

## 2025-06-24 DIAGNOSIS — Z12.5 PROSTATE CANCER SCREENING: ICD-10-CM

## 2025-06-24 DIAGNOSIS — E66.811 CLASS 1 OBESITY WITH BODY MASS INDEX (BMI) OF 34.0 TO 34.9 IN ADULT, UNSPECIFIED OBESITY TYPE, UNSPECIFIED WHETHER SERIOUS COMORBIDITY PRESENT: ICD-10-CM

## 2025-06-24 DIAGNOSIS — I10 PRIMARY HYPERTENSION: ICD-10-CM

## 2025-06-24 DIAGNOSIS — I48.91 ATRIAL FIBRILLATION WITH RVR: ICD-10-CM

## 2025-06-24 PROCEDURE — 4010F ACE/ARB THERAPY RXD/TAKEN: CPT | Mod: CPTII,S$GLB,, | Performed by: FAMILY MEDICINE

## 2025-06-24 PROCEDURE — 3008F BODY MASS INDEX DOCD: CPT | Mod: CPTII,S$GLB,, | Performed by: FAMILY MEDICINE

## 2025-06-24 PROCEDURE — 3078F DIAST BP <80 MM HG: CPT | Mod: CPTII,S$GLB,, | Performed by: FAMILY MEDICINE

## 2025-06-24 PROCEDURE — 99999 PR PBB SHADOW E&M-EST. PATIENT-LVL V: CPT | Mod: PBBFAC,,, | Performed by: FAMILY MEDICINE

## 2025-06-24 PROCEDURE — 3074F SYST BP LT 130 MM HG: CPT | Mod: CPTII,S$GLB,, | Performed by: FAMILY MEDICINE

## 2025-06-24 PROCEDURE — 99396 PREV VISIT EST AGE 40-64: CPT | Mod: S$GLB,,, | Performed by: FAMILY MEDICINE

## 2025-06-24 PROCEDURE — 1160F RVW MEDS BY RX/DR IN RCRD: CPT | Mod: CPTII,S$GLB,, | Performed by: FAMILY MEDICINE

## 2025-06-24 PROCEDURE — 1159F MED LIST DOCD IN RCRD: CPT | Mod: CPTII,S$GLB,, | Performed by: FAMILY MEDICINE

## 2025-06-24 RX ORDER — SERTRALINE HYDROCHLORIDE 50 MG/1
50 TABLET, FILM COATED ORAL DAILY
Qty: 90 TABLET | Refills: 3 | Status: SHIPPED | OUTPATIENT
Start: 2025-06-24 | End: 2026-06-24

## 2025-06-24 RX ORDER — OMEPRAZOLE 20 MG/1
20 CAPSULE, DELAYED RELEASE ORAL DAILY
Qty: 90 CAPSULE | Refills: 3 | Status: SHIPPED | OUTPATIENT
Start: 2025-06-24

## 2025-06-24 RX ORDER — LOSARTAN POTASSIUM 25 MG/1
25 TABLET ORAL DAILY
Qty: 90 TABLET | Refills: 3 | Status: SHIPPED | OUTPATIENT
Start: 2025-06-24

## 2025-06-24 RX ORDER — METOPROLOL SUCCINATE 25 MG/1
25 TABLET, EXTENDED RELEASE ORAL DAILY
Qty: 90 TABLET | Refills: 3 | Status: SHIPPED | OUTPATIENT
Start: 2025-06-24 | End: 2026-06-24

## 2025-06-24 RX ORDER — GABAPENTIN 300 MG/1
300 CAPSULE ORAL EVERY 8 HOURS
Qty: 270 CAPSULE | Refills: 3 | Status: SHIPPED | OUTPATIENT
Start: 2025-06-24 | End: 2026-06-24

## 2025-06-24 NOTE — PROGRESS NOTES
Subjective     Patient ID: Silverio Devlin is a 60 y.o. male.    Chief Complaint: Annual Exam    HPI 60-year-old white male presents to clinic today for annual physical exam.  He was hospitalized last year secondary to alcohol withdrawal and atrial fibrillation.  Since last year he has remained alcohol free.  Atrial fibrillation remained stable on aspirin 325 mg daily, metoprolol 25 mg daily, losartan 25 mg daily, and amlodipine 5 mg daily; however, he has been out of amlodipine for approximately 2-1/2 weeks.  Blood pressure continues to remain well-controlled off amlodipine.  I recommend that he continue to remain off amlodipine as I feel that additional would cause hypotension.  I have encouraged the patient to follow-up with cardiology for continued evaluation of his atrial fibrillation.  His mood does remained stable on Zoloft 50 mg daily.  GERD remains well controlled on omeprazole 20 mg daily.  Sleep apnea remains well controlled with use of CPAP nightly.  He reports a past surgical history of nasal septal repair.  He reports a family history of his mother having dementia, his father having thyroid disease and GERD, in his brother having Guillain-Racine.  He is up-to-date with all screening exams.  Review of Systems   Constitutional:  Negative for appetite change, chills, fatigue and fever.   HENT:  Negative for nasal congestion, ear pain, hearing loss, postnasal drip, rhinorrhea, sinus pressure/congestion, sore throat and tinnitus.    Eyes:  Negative for redness, itching and visual disturbance.   Respiratory:  Negative for cough, chest tightness and shortness of breath.    Cardiovascular:  Negative for chest pain and palpitations.   Gastrointestinal:  Negative for abdominal pain, constipation, diarrhea, nausea and vomiting.   Genitourinary:  Negative for decreased urine volume, difficulty urinating, dysuria, frequency, hematuria and urgency.   Musculoskeletal:  Negative for back pain, myalgias, neck pain and neck  stiffness.   Integumentary:  Negative for rash.   Neurological:  Negative for dizziness, light-headedness and headaches.   Psychiatric/Behavioral: Negative.            Objective     Physical Exam  Vitals and nursing note reviewed.   Constitutional:       General: He is not in acute distress.     Appearance: Normal appearance. He is well-developed. He is not diaphoretic.   HENT:      Head: Normocephalic and atraumatic.      Right Ear: External ear normal.      Left Ear: External ear normal.      Nose: Nose normal.      Mouth/Throat:      Pharynx: No oropharyngeal exudate.   Eyes:      General: No scleral icterus.        Right eye: No discharge.         Left eye: No discharge.      Conjunctiva/sclera: Conjunctivae normal.      Pupils: Pupils are equal, round, and reactive to light.   Neck:      Thyroid: No thyromegaly.      Vascular: No JVD.      Trachea: No tracheal deviation.   Cardiovascular:      Rate and Rhythm: Normal rate and regular rhythm.      Heart sounds: Normal heart sounds. No murmur heard.     No friction rub. No gallop.   Pulmonary:      Effort: Pulmonary effort is normal. No respiratory distress.      Breath sounds: Normal breath sounds. No stridor. No wheezing, rhonchi or rales.   Chest:      Chest wall: No tenderness.   Abdominal:      General: Bowel sounds are normal. There is no distension.      Palpations: Abdomen is soft. There is no mass.      Tenderness: There is no abdominal tenderness. There is no guarding or rebound.   Musculoskeletal:         General: No tenderness. Normal range of motion.      Cervical back: Normal range of motion and neck supple.   Lymphadenopathy:      Cervical: No cervical adenopathy.   Skin:     General: Skin is warm and dry.      Coloration: Skin is not pale.      Findings: No erythema or rash.   Neurological:      Mental Status: He is alert and oriented to person, place, and time.   Psychiatric:         Mood and Affect: Mood normal.         Behavior: Behavior  normal.         Thought Content: Thought content normal.         Judgment: Judgment normal.            Assessment and Plan     1. Annual physical exam  -     CBC Auto Differential; Future; Expected date: 06/24/2025  -     Comprehensive Metabolic Panel; Future; Expected date: 06/24/2025  -     Lipid Panel; Future; Expected date: 06/24/2025  -     T4, Free; Future; Expected date: 06/24/2025  -     TSH; Future; Expected date: 06/24/2025  -     Hemoglobin A1C; Future; Expected date: 06/24/2025  -     Urinalysis, Reflex to Urine Culture Urine, Clean Catch; Future; Expected date: 06/24/2025  -     PSA, Screening; Future; Expected date: 06/24/2025    2. Atrial fibrillation with RVR  -     metoprolol succinate (TOPROL-XL) 25 MG 24 hr tablet; Take 1 tablet (25 mg total) by mouth once daily.  Dispense: 90 tablet; Refill: 3  -     Ambulatory referral/consult to Cardiology; Future; Expected date: 07/01/2025    3. Primary hypertension  -     losartan (COZAAR) 25 MG tablet; Take 1 tablet (25 mg total) by mouth once daily.  Dispense: 90 tablet; Refill: 3    4. FRANKO (obstructive sleep apnea)    5. Class 1 obesity with body mass index (BMI) of 34.0 to 34.9 in adult, unspecified obesity type, unspecified whether serious comorbidity present    6. Prostate cancer screening  -     PSA, Screening; Future; Expected date: 06/24/2025    7. Gastroesophageal reflux disease, unspecified whether esophagitis present  -     omeprazole (PRILOSEC) 20 MG capsule; Take 1 capsule (20 mg total) by mouth once daily.  Dispense: 90 capsule; Refill: 3    Other orders  -     gabapentin (NEURONTIN) 300 MG capsule; Take 1 capsule (300 mg total) by mouth every 8 (eight) hours.  Dispense: 270 capsule; Refill: 3  -     sertraline (ZOLOFT) 50 MG tablet; Take 1 tablet (50 mg total) by mouth once daily.  Dispense: 90 tablet; Refill: 3        1. CBC, CMP, UA, TSH, free T4, fasting lipids, hemoglobin A1c, and PSA.    2, 3.  Continue aspirin 325 mg daily, metoprolol 25  mg daily, and losartan 25 mg daily.  Atrial fibrillation and hypertension remained stable.  Refer to Cardiology for continued evaluation of atrial fibrillation.  4. Continue CPAP nightly.  Sleep apnea is well controlled.    5. Continue omeprazole 20 mg daily.  GERD is well controlled.    6. Continue diet and exercise.  The patient has lost 11 lb over the past year.  7. PSA screen.    8. Return to clinic as needed or in 1 year for annual physical exam.             Follow up in about 1 year (around 6/24/2026), or if symptoms worsen or fail to improve, for Annual exam.

## 2025-06-27 ENCOUNTER — LAB VISIT (OUTPATIENT)
Dept: LAB | Facility: HOSPITAL | Age: 60
End: 2025-06-27
Attending: FAMILY MEDICINE
Payer: COMMERCIAL

## 2025-06-27 DIAGNOSIS — Z00.00 ANNUAL PHYSICAL EXAM: ICD-10-CM

## 2025-06-27 DIAGNOSIS — Z12.5 PROSTATE CANCER SCREENING: ICD-10-CM

## 2025-06-27 LAB
ABSOLUTE EOSINOPHIL (OHS): 0.06 K/UL
ABSOLUTE MONOCYTE (OHS): 0.88 K/UL (ref 0.3–1)
ABSOLUTE NEUTROPHIL COUNT (OHS): 4.72 K/UL (ref 1.8–7.7)
ALBUMIN SERPL BCP-MCNC: 4 G/DL (ref 3.5–5.2)
ALP SERPL-CCNC: 71 UNIT/L (ref 40–150)
ALT SERPL W/O P-5'-P-CCNC: 16 UNIT/L (ref 10–44)
ANION GAP (OHS): 10 MMOL/L (ref 8–16)
AST SERPL-CCNC: 21 UNIT/L (ref 11–45)
BASOPHILS # BLD AUTO: 0.05 K/UL
BASOPHILS NFR BLD AUTO: 0.7 %
BILIRUB SERPL-MCNC: 0.4 MG/DL (ref 0.1–1)
BILIRUB UR QL STRIP.AUTO: NEGATIVE
BUN SERPL-MCNC: 11 MG/DL (ref 6–20)
CALCIUM SERPL-MCNC: 9.3 MG/DL (ref 8.7–10.5)
CHLORIDE SERPL-SCNC: 103 MMOL/L (ref 95–110)
CHOLEST SERPL-MCNC: 184 MG/DL (ref 120–199)
CHOLEST/HDLC SERPL: 7.1 {RATIO} (ref 2–5)
CLARITY UR: CLEAR
CO2 SERPL-SCNC: 25 MMOL/L (ref 23–29)
COLOR UR AUTO: YELLOW
CREAT SERPL-MCNC: 1 MG/DL (ref 0.5–1.4)
EAG (OHS): 117 MG/DL (ref 68–131)
ERYTHROCYTE [DISTWIDTH] IN BLOOD BY AUTOMATED COUNT: 13.7 % (ref 11.5–14.5)
GFR SERPLBLD CREATININE-BSD FMLA CKD-EPI: >60 ML/MIN/1.73/M2
GLUCOSE SERPL-MCNC: 117 MG/DL (ref 70–110)
GLUCOSE UR QL STRIP: NEGATIVE
HBA1C MFR BLD: 5.7 % (ref 4–5.6)
HCT VFR BLD AUTO: 42 % (ref 40–54)
HDLC SERPL-MCNC: 26 MG/DL (ref 40–75)
HDLC SERPL: 14.1 % (ref 20–50)
HGB BLD-MCNC: 13.9 GM/DL (ref 14–18)
HGB UR QL STRIP: NEGATIVE
IMM GRANULOCYTES # BLD AUTO: 0.05 K/UL (ref 0–0.04)
IMM GRANULOCYTES NFR BLD AUTO: 0.7 % (ref 0–0.5)
KETONES UR QL STRIP: NEGATIVE
LDLC SERPL CALC-MCNC: 135.8 MG/DL (ref 63–159)
LEUKOCYTE ESTERASE UR QL STRIP: NEGATIVE
LYMPHOCYTES # BLD AUTO: 0.91 K/UL (ref 1–4.8)
MCH RBC QN AUTO: 31.2 PG (ref 27–31)
MCHC RBC AUTO-ENTMCNC: 33.1 G/DL (ref 32–36)
MCV RBC AUTO: 94 FL (ref 82–98)
NITRITE UR QL STRIP: NEGATIVE
NONHDLC SERPL-MCNC: 158 MG/DL
NUCLEATED RBC (/100WBC) (OHS): 0 /100 WBC
PH UR STRIP: 6 [PH]
PLATELET # BLD AUTO: 275 K/UL (ref 150–450)
PMV BLD AUTO: 9.5 FL (ref 9.2–12.9)
POTASSIUM SERPL-SCNC: 4.1 MMOL/L (ref 3.5–5.1)
PROT SERPL-MCNC: 7.3 GM/DL (ref 6–8.4)
PROT UR QL STRIP: NEGATIVE
PSA SERPL-MCNC: 1.09 NG/ML
RBC # BLD AUTO: 4.45 M/UL (ref 4.6–6.2)
RELATIVE EOSINOPHIL (OHS): 0.9 %
RELATIVE LYMPHOCYTE (OHS): 13.6 % (ref 18–48)
RELATIVE MONOCYTE (OHS): 13.2 % (ref 4–15)
RELATIVE NEUTROPHIL (OHS): 70.9 % (ref 38–73)
SODIUM SERPL-SCNC: 138 MMOL/L (ref 136–145)
SP GR UR STRIP: 1
T4 FREE SERPL-MCNC: 0.93 NG/DL (ref 0.71–1.51)
TRIGL SERPL-MCNC: 111 MG/DL (ref 30–150)
TSH SERPL-ACNC: 1.21 UIU/ML (ref 0.4–4)
UROBILINOGEN UR STRIP-ACNC: NEGATIVE EU/DL
WBC # BLD AUTO: 6.67 K/UL (ref 3.9–12.7)

## 2025-06-27 PROCEDURE — 84439 ASSAY OF FREE THYROXINE: CPT

## 2025-06-27 PROCEDURE — 83036 HEMOGLOBIN GLYCOSYLATED A1C: CPT

## 2025-06-27 PROCEDURE — 84443 ASSAY THYROID STIM HORMONE: CPT

## 2025-06-27 PROCEDURE — 80061 LIPID PANEL: CPT

## 2025-06-27 PROCEDURE — 36415 COLL VENOUS BLD VENIPUNCTURE: CPT | Mod: PO

## 2025-06-27 PROCEDURE — 85025 COMPLETE CBC W/AUTO DIFF WBC: CPT

## 2025-06-27 PROCEDURE — 80053 COMPREHEN METABOLIC PANEL: CPT

## 2025-06-27 PROCEDURE — 81003 URINALYSIS AUTO W/O SCOPE: CPT

## 2025-06-27 PROCEDURE — 84153 ASSAY OF PSA TOTAL: CPT

## 2025-06-28 LAB — HOLD SPECIMEN: NORMAL

## 2025-06-30 ENCOUNTER — RESULTS FOLLOW-UP (OUTPATIENT)
Dept: INTERNAL MEDICINE | Facility: CLINIC | Age: 60
End: 2025-06-30

## 2025-08-04 ENCOUNTER — TELEPHONE (OUTPATIENT)
Dept: CARDIOLOGY | Facility: CLINIC | Age: 60
End: 2025-08-04
Payer: COMMERCIAL

## (undated) DEVICE — Device

## (undated) DEVICE — BLADE INFERIOR TURBINATE 5/PK

## (undated) DEVICE — ADHESIVE MASTISOL VIAL 48/BX

## (undated) DEVICE — MARKER SKIN STND TIP BLUE BARR

## (undated) DEVICE — VIDEO RENTAL SERVICE

## (undated) DEVICE — SEE L#120831

## (undated) DEVICE — TUBING SUC UNIV W/CONN 12FT

## (undated) DEVICE — SYR B-D DISP CONTROL 10CC100/C

## (undated) DEVICE — TOWEL OR DISP STRL BLUE 4/PK

## (undated) DEVICE — NDL ECLIPSE SAFETY 18GX1-1/2IN

## (undated) DEVICE — DRAPE STERI INSTRUMENT 1018

## (undated) DEVICE — TUBE SUMP NASOGASTRIC 16FR

## (undated) DEVICE — SPONGE PATTY SURGICAL .5X3IN

## (undated) DEVICE — DRAPE STERI-DRAPE 1000 17X11IN

## (undated) DEVICE — DRESSING TRANS 2X2 TEGADERM

## (undated) DEVICE — GOWN SMART IMP BREATHABLE XXLG

## (undated) DEVICE — ELECTRODE REM PLYHSV RETURN 9

## (undated) DEVICE — SKINMARKER & RULER REGULAR X-F

## (undated) DEVICE — APPLICATOR COTTON TIP 6IN STRL

## (undated) DEVICE — SPONGE COTTON TRAY 4X4IN

## (undated) DEVICE — BLADE SURG STAINLESS STEEL #15

## (undated) DEVICE — PENCIL ELECTROSURG HOLST W/BLD

## (undated) DEVICE — SEE MEDLINE ITEM 157194

## (undated) DEVICE — SYR 10CC LUER LOCK

## (undated) DEVICE — GLOVE BIOGEL ECLIPSE SZ 7.5

## (undated) DEVICE — ELECTRODE NEEDLE 1IN

## (undated) DEVICE — CLOSURE SKIN STERI STRIP 1/2X4

## (undated) DEVICE — BOWL STERILE LARGE 32OZ